# Patient Record
Sex: FEMALE | Race: WHITE | Employment: PART TIME | URBAN - METROPOLITAN AREA
[De-identification: names, ages, dates, MRNs, and addresses within clinical notes are randomized per-mention and may not be internally consistent; named-entity substitution may affect disease eponyms.]

---

## 2017-07-21 ENCOUNTER — OFFICE VISIT (OUTPATIENT)
Dept: INTERNAL MEDICINE | Facility: CLINIC | Age: 57
End: 2017-07-21
Payer: MEDICARE

## 2017-07-21 VITALS
TEMPERATURE: 97.8 F | BODY MASS INDEX: 26.68 KG/M2 | HEART RATE: 74 BPM | HEIGHT: 66 IN | SYSTOLIC BLOOD PRESSURE: 110 MMHG | RESPIRATION RATE: 16 BRPM | DIASTOLIC BLOOD PRESSURE: 70 MMHG | WEIGHT: 166 LBS | OXYGEN SATURATION: 98 %

## 2017-07-21 DIAGNOSIS — M48.07 FORAMINAL STENOSIS OF LUMBOSACRAL REGION: Primary | ICD-10-CM

## 2017-07-21 DIAGNOSIS — G89.29 CHRONIC LEFT-SIDED LOW BACK PAIN WITH LEFT-SIDED SCIATICA: ICD-10-CM

## 2017-07-21 DIAGNOSIS — R53.83 FATIGUE, UNSPECIFIED TYPE: ICD-10-CM

## 2017-07-21 DIAGNOSIS — K21.9 GASTROESOPHAGEAL REFLUX DISEASE WITHOUT ESOPHAGITIS: ICD-10-CM

## 2017-07-21 DIAGNOSIS — M54.42 CHRONIC LEFT-SIDED LOW BACK PAIN WITH LEFT-SIDED SCIATICA: ICD-10-CM

## 2017-07-21 DIAGNOSIS — E55.9 VITAMIN D DEFICIENCY: ICD-10-CM

## 2017-07-21 PROCEDURE — 99215 OFFICE O/P EST HI 40 MIN: CPT | Performed by: INTERNAL MEDICINE

## 2017-07-21 RX ORDER — CYANOCOBALAMIN (VITAMIN B-12) 2500 MCG
2500 TABLET, SUBLINGUAL SUBLINGUAL DAILY
Qty: 100 TABLET | Refills: 3 | Status: SHIPPED | OUTPATIENT
Start: 2017-07-21 | End: 2018-02-19

## 2017-07-21 RX ORDER — BACLOFEN 10 MG/1
TABLET ORAL
Qty: 60 TABLET | Refills: 1 | Status: SHIPPED | OUTPATIENT
Start: 2017-07-21 | End: 2018-02-19

## 2017-07-21 RX ORDER — ACETAMINOPHEN 500 MG
1000 TABLET ORAL EVERY 8 HOURS
Qty: 100 TABLET | Refills: 0 | Status: SHIPPED | OUTPATIENT
Start: 2017-07-21 | End: 2018-02-19

## 2017-07-21 RX ORDER — GABAPENTIN 300 MG/1
300 CAPSULE ORAL AT BEDTIME
Qty: 90 CAPSULE | Refills: 3 | Status: SHIPPED | OUTPATIENT
Start: 2017-07-21 | End: 2018-02-19

## 2017-07-21 NOTE — PROGRESS NOTES
SUBJECTIVE:                                                        OFFICE VISIT VISIT ACCOMPLISHED WITH THE HELP OF SARINA       Ar Elisabeth Patricia is a 57 year old female who presents to clinic today for the following health issues:RSULTS  RESULTS    Back Pain       Duration: YEARS        Specific cause: lifting    Description:   Location of pain: low back bilateral    Character: 6/10, moderate, radiates down into the left leg with numbness and tingling but no weakness noted.    History:   Pain interferes with job: YES  History of back problems: no prior back problems  Any previous MRI or X-rays: None  Sees a specialist for back pain:  [facility] md [place]  Therapies tried without relief: acetaminophen (Tylenol)    Alleviating factors:   Improved by: NOTHING      Precipitating factors:  Worsened by: Lifting and Bending    Functional and Psychosocial Screen (Sadia STarT Back):      Not performed today        Accompanying Signs & Symptoms:  Risk of Fracture:  None  Risk of Cauda Equina:  None  Risk of Infection:  None  Risk of Cancer:  None  Risk of Ankylosing Spondylitis:  Onset at age <35, male, AND morning back stiffness. no       She was seen approximately 7 seven months ago with back pain and was referred to orthopedic surgery and follow through but I do not see any records here in my system. I am unsure of what the plan was at the time.      Previoust labs are as noted and addressed in the plan section of this note. She reports that she has been taking her supplements as prescribed.as documented      Problem list and histories reviewed & adjusted, as indicated.  Additional history: as documented    Labs reviewed in EPIC    Reviewed and updated as needed this visit by clinical staff  Tobacco  Allergies  Meds  Med Hx  Surg Hx  Fam Hx  Soc Hx      Reviewed and updated as needed this visit by Provider         ROS:  14 point ROS negative except as above      OBJECTIVE:     /70  Pulse 74  Temp  "97.8  F (36.6  C) (Oral)  Resp 16  Ht 5' 6\" (1.676 m)  Wt 166 lb (75.3 kg)  LMP 07/28/2000  SpO2 98%  BMI 26.79 kg/m2  Body mass index is 26.79 kg/(m^2).  GENERAL: healthy, alert and no distress  NECK: no adenopathy, no asymmetry, masses, or scars and thyroid normal to palpation  RESP: lungs clear to auscultation - no rales, rhonchi or wheezes  CV: regular rate and rhythm, normal S1 S2, no S3 or S4, no murmur, click or rub, no peripheral edema and peripheral pulses strong  ABDOMEN: soft, nontender, no hepatosplenomegaly, no masses and bowel sounds normal  MS: tenderness to palpation lower lumbar spine and especially over L5-S1 paraspinal area  NEURO: Normal strength and tone, mentation intact and speech normal    Diagnostic Test Results:  Results for orders placed or performed during the hospital encounter of 12/09/16   DX Hip/Pelvis/Spine    Narrative    DX HIP/PELVIS/SPINE  12/9/2016 2:32 PM    HISTORY:  Asymptomatic menopausal state, Other specified disorders of  bone density and structure, unspecified site    FINDINGS: This DEXA scan was performed using a [employer] scanner.   DEXA results are reported according to T-score.  The T-score is the  standard deviation from the peak bone mass in a normal young adult  population.  In accordance with the ISCD (International Society of  Clinical Densitometry), the lower of the total proximal femur vs  femoral neck T-score is reported.  Osteopenia is defined as a T-score  of -1.0 to -2.5.  Osteoporosis is defined as a T-score of less than  -2.5.    T-SCORES:  Lumbar Spine L1-L4 T-score: -1.8    Left Hip (Neck) T-score: -0.6  Right Hip (Neck) T-score: -0.7    Hip lowest neck BMD: 0.945 gm/cm2.    FRAX 10-YEAR PROBABILITY OF FRACTURE*:  Major Osteoporotic: 3%  Hip: 0.1%    *All treatment decisions require clinical judgment and consideration  of individual patient factors which may not be captured in the FRAX  model and the risk of fracture may be over- or " under-estimated by  FRAX.      Impression    IMPRESSION: Lumbar spine osteopenia.    SARAH BAR MD       ASSESSMENT/PLAN:     DIAGNOSIS/PLAN:     ICD-10-CM    1. Fatigue, unspecified type R53.83 cyabnocobalamin (VITAMIN B-12) 2500 MCG sublingual tablet     Vitamin D Deficiency   2. Vitamin D deficiency E55.9 cholecalciferol 5000 UNITS CAPS     Calcium Carb-Cholecalciferol (CALCIUM 1000 + D) 1000-800 MG-UNIT TABS     Vitamin D Deficiency     Basic metabolic panel   3. Foraminal stenosis of lumbosacral region M99.83 ORTHOPEDICS ADULT REFERRAL     gabapentin (NEURONTIN) 300 MG capsule     CANCELED: ORTHOPEDICS ADULT REFERRAL   4. Chronic left-sided low back pain without sciatica M54.5 acetaminophen (TYLENOL) 500 MG tablet    G89.29 baclofen (LIORESAL) 10 MG tablet   5. Acute suppurative otitis media of left ear without spontaneous rupture of tympanic membrane, recurrence not specified H66.002    6. Gastroesophageal reflux disease without esophagitis K21.9 ranitidine (ZANTAC) 150 MG tablet       SIGNIFICANT ISSUES RE The primary encounter diagnosis was Fatigue, unspecified type. Diagnoses of Vitamin D deficiency, Foraminal stenosis of lumbosacral region, Chronic left-sided low back pain without sciatica, Acute suppurative otitis media of left ear without spontaneous rupture of tympanic membrane, recurrence not specified, and Gastroesophageal reflux disease without esophagitis were also pertinent to this visit. AS NOTED AND ADDRESSED ABOVE   MEDS AND AND LABS AS ORDERED TO ADDRESS PREVIOUS AND CURRENT ABNORMAL INDICES    SEE PATIENT INSTRUCTION SECTION FOR FOLLOW UP PLAN    Ardo IS TO CONTINUE OTHER TREATMENT REGIMEN/PLANS EXCEPT AS INDICATED    COMPLIANCE WITH MEDICATIONS DIET AND EXERCISE PLANS ENCOURAGED    DISCONTINUED MEDS:  Medications Discontinued During This Encounter   Medication Reason     cholecalciferol 5000 UNITS CAPS Reorder     Calcium Carb-Cholecalciferol (CALCIUM 1000 + D) 1000-800 MG-UNIT TABS  Reorder     cyabnocobalamin (VITAMIN B-12) 2500 MCG sublingual tablet Reorder     ibuprofen (ADVIL,MOTRIN) 600 MG tablet      naproxen (NAPROSYN) 500 MG tablet Reorder     baclofen (LIORESAL) 10 MG tablet Reorder     ranitidine (ZANTAC) 150 MG tablet Reorder       CURRENT MED LIST WITH CHANGES AS NOTED BELOW:  Current Outpatient Prescriptions   Medication Sig Dispense Refill     Acetaminophen (TYLENOL ARTHRITIS PAIN PO) Take 500 mg by mouth       cholecalciferol 5000 UNITS CAPS Take 1 capsule (5,000 Units) by mouth daily FOR VITAMIN D DEFICIENCY (LOW VITAMIN D) 100 capsule 3     Calcium Carb-Cholecalciferol (CALCIUM 1000 + D) 1000-800 MG-UNIT TABS Take 1 tablet by mouth daily TAKE WITH FOOD, FOR BONE HEALTH AND FOR VITAMIN D SUPPLEMENTATION 100 tablet PRN     cyabnocobalamin (VITAMIN B-12) 2500 MCG sublingual tablet Take 2,500 mcg by mouth daily INDICATION: FOR VITAMIN B12 SUPPLEMENTATION - TO IMPROVE MEMORY, BALANCE, SLEEP AND MOOD, DIRECTIONS: PLEASE PLACE UNDER THE TONGUE TO DISSOLVE 100 tablet 3     acetaminophen (TYLENOL) 500 MG tablet Take 2 tablets (1,000 mg) by mouth every 8 hours 100 tablet 0     baclofen (LIORESAL) 10 MG tablet take 1 tab in am, 1 tab in the afternoon and 2 tabs at bed time 60 tablet 1     gabapentin (NEURONTIN) 300 MG capsule Take 1 capsule (300 mg) by mouth At Bedtime 90 capsule 3     ranitidine (ZANTAC) 150 MG tablet Take 1 tablet (150 mg) by mouth 2 times daily 180 tablet 1         Patient Instructions       ** FOLLOW UP PLAN**:    PLEASE SCHEDULE LABS WITH OFFICE VISIT 4-5 MONTHS FROM TODAY TO FOLLOW UP ON  Vitamin Deficiencies  Fatigue, unspecified type  Chronic left-sided low back pain     BE SURE TO SCHEDULE YOUR LAB DRAW APPOINTMENT FOR AT LEAST 5 DAYS BEFORE YOUR NEXT VISIT      YOU HAVE BEEN REFERRED TO ORTHOPEDICS TO ADDRESS ISSUES RAISED TODAY REGARDING BACK PAIN   PLEASE  MAKE SURE TO SCHEDULE ORTHOPEDICS APPOINTMENT(S) BY TELEPHONE      YOU MAY CONTACT THE CLINIC IF ANY  QUESTIONS OR CONCERNS -[phone] OR VIA [employer]           Low-Cholesterol Diet  Your body needs cholesterol to build new cells and create certain hormones. There are 2 kinds of cholesterol in your blood:      HDL ( good ) cholesterol. This prevents fat deposits (plaque) from building up in your arteries. In this way it protects against heart disease and stroke.    LDL ( bad ) cholesterol. This stays in your body and sticks to artery walls. Over time it may block blood flow to the heart and brain. This can cause a heart attack or stroke.  The cholesterol in your blood comes from 2 sources: cholesterol in food that you eat and cholesterol that your liver makes. You should limit the amount of cholesterol in your diet. But the cholesterol that your body makes has the greatest disease risk. And your body makes more cholesterol when your diet is high in bad fats (saturated and trans fats). There are 2 kinds of fats you can eat:    Good fats, or unsaturated fats (mono-unsaturated and poly-unsaturated). They raise the level of good cholesterol and lower the level of bad cholesterol. Good fats are found in vegetable oils such as olive, sunflower, corn, and soybean oils, and in nuts and seeds.    Bad fats, or saturated fats (including foods high in cholesterol) and trans fats. These raise your risk of disease. They lower the good cholesterol and raise the level of bad cholesterol. Bad fats are found in animal products, including meat, whole-milk dairy products, and butter. Some plants are also high in bad fats (coconut and palm plants). Trans fats are found in hard (stick) margarines. They are also in many fast foods and commercially baked goods. Soft margarine sold in tubs has fewer trans fats and is safer to use.  High blood cholesterol is usually due to a diet high in saturated fat, along with not being physically active. In some cases, genetics plays a role in causing high cholesterol. The tips below will help you  create healthy eating habits that will help lower your blood cholesterol level.  Create a diet high in good fats, low in bad fats (and low in cholesterol)  The following steps will help you create a diet high in good fats and low in bad fats:    Talk with your doctor before starting a low cholesterol diet or weight loss program.    Learn to read nutrition labels and select appropriate portion sizes.    When cooking, use plant-based unsaturated vegetable oils (sunflower, corn, soybean, canola, peanut, and olive oils).    Avoid saturated fats found in animal products such as meat, dairy (whole-milk, cheese and ice cream), poultry skin, and egg yolks. Plants high in saturated oils include coconut oil, palm oil, and palm kernel oil.    If you eat meat, choose smaller portions and lean cuts, such as round, dez, sirloin, or loin. Eat more meatless meals.    Replace meat with fish at least 2 times a week. Fish is an important source of the unsaturated fat called omega-3 fatty acids. This fat has potential to lower the risk of heart disease.    Replace whole-milk dairy products with low-fat or nonfat products. Try soy products. Soy helps to reduce total cholesterol.    Supplement your diet with protective fibers. Eat nuts, seeds, and whole grains rather than white rice and bread. These foods lower both cholesterol and triglyceride levels. (Triglycerides are another fat found in the blood.) Walnuts are one of the best sources of omega-3 fatty acids.    Eat plenty of fresh fruits and vegetables daily.    Avoid fast foods and commercial baked goods. Assume they contain saturated fat unless labeled otherwise.  Date Last Reviewed: 8/1/2016 2000-2017 [employer]. [address] [place] [address], [place] [address]. All rights reserved. This information is not intended as a substitute for professional medical care. Always follow your healthcare professional's instructions.            Joel Santoro MD  [place]  [facility]

## 2017-07-21 NOTE — PATIENT INSTRUCTIONS
** FOLLOW UP PLAN**:    PLEASE SCHEDULE LABS WITH OFFICE VISIT 4-5 MONTHS FROM TODAY TO FOLLOW UP ON  Vitamin Deficiencies  Fatigue, unspecified type  Chronic left-sided low back pain     BE SURE TO SCHEDULE YOUR LAB DRAW APPOINTMENT FOR AT LEAST 5 DAYS BEFORE YOUR NEXT VISIT      YOU HAVE BEEN REFERRED TO ORTHOPEDICS TO ADDRESS ISSUES RAISED TODAY REGARDING BACK PAIN   PLEASE  MAKE SURE TO SCHEDULE ORTHOPEDICS APPOINTMENT(S) BY TELEPHONE      YOU MAY CONTACT THE CLINIC IF ANY QUESTIONS OR CONCERNS -[phone] OR VIA [employer]           Low-Cholesterol Diet  Your body needs cholesterol to build new cells and create certain hormones. There are 2 kinds of cholesterol in your blood:      HDL ( good ) cholesterol. This prevents fat deposits (plaque) from building up in your arteries. In this way it protects against heart disease and stroke.    LDL ( bad ) cholesterol. This stays in your body and sticks to artery walls. Over time it may block blood flow to the heart and brain. This can cause a heart attack or stroke.  The cholesterol in your blood comes from 2 sources: cholesterol in food that you eat and cholesterol that your liver makes. You should limit the amount of cholesterol in your diet. But the cholesterol that your body makes has the greatest disease risk. And your body makes more cholesterol when your diet is high in bad fats (saturated and trans fats). There are 2 kinds of fats you can eat:    Good fats, or unsaturated fats (mono-unsaturated and poly-unsaturated). They raise the level of good cholesterol and lower the level of bad cholesterol. Good fats are found in vegetable oils such as olive, sunflower, corn, and soybean oils, and in nuts and seeds.    Bad fats, or saturated fats (including foods high in cholesterol) and trans fats. These raise your risk of disease. They lower the good cholesterol and raise the level of bad cholesterol. Bad fats are found in animal products, including meat,  whole-milk dairy products, and butter. Some plants are also high in bad fats (coconut and palm plants). Trans fats are found in hard (stick) margarines. They are also in many fast foods and commercially baked goods. Soft margarine sold in tubs has fewer trans fats and is safer to use.  High blood cholesterol is usually due to a diet high in saturated fat, along with not being physically active. In some cases, genetics plays a role in causing high cholesterol. The tips below will help you create healthy eating habits that will help lower your blood cholesterol level.  Create a diet high in good fats, low in bad fats (and low in cholesterol)  The following steps will help you create a diet high in good fats and low in bad fats:    Talk with your doctor before starting a low cholesterol diet or weight loss program.    Learn to read nutrition labels and select appropriate portion sizes.    When cooking, use plant-based unsaturated vegetable oils (sunflower, corn, soybean, canola, peanut, and olive oils).    Avoid saturated fats found in animal products such as meat, dairy (whole-milk, cheese and ice cream), poultry skin, and egg yolks. Plants high in saturated oils include coconut oil, palm oil, and palm kernel oil.    If you eat meat, choose smaller portions and lean cuts, such as round, dez, sirloin, or loin. Eat more meatless meals.    Replace meat with fish at least 2 times a week. Fish is an important source of the unsaturated fat called omega-3 fatty acids. This fat has potential to lower the risk of heart disease.    Replace whole-milk dairy products with low-fat or nonfat products. Try soy products. Soy helps to reduce total cholesterol.    Supplement your diet with protective fibers. Eat nuts, seeds, and whole grains rather than white rice and bread. These foods lower both cholesterol and triglyceride levels. (Triglycerides are another fat found in the blood.) Walnuts are one of the best sources of omega-3  fatty acids.    Eat plenty of fresh fruits and vegetables daily.    Avoid fast foods and commercial baked goods. Assume they contain saturated fat unless labeled otherwise.  Date Last Reviewed: 8/1/2016 2000-2017 [employer]. [address] [place] [address], [place] [address]. All rights reserved. This information is not intended as a substitute for professional medical care. Always follow your healthcare professional's instructions.

## 2017-07-21 NOTE — MR AVS SNAPSHOT
After Visit Summary   7/21/2017    Colette Patricia    MRN: [id number]           Patient Information     Date Of Birth          1960        Visit Information        Provider Department      7/21/2017 10:15 AM Joel Santoro MD; [employer] [facility]        Today's Diagnoses     Foraminal stenosis of lumbosacral region    -  1    Vitamin D deficiency        Fatigue, unspecified type        Chronic left-sided low back pain with left-sided sciatica        Gastroesophageal reflux disease without esophagitis          Care Instructions        ** FOLLOW UP PLAN**:    PLEASE SCHEDULE LABS WITH OFFICE VISIT 4-5 MONTHS FROM TODAY TO FOLLOW UP ON  Vitamin Deficiencies  Fatigue, unspecified type  Chronic left-sided low back pain     BE SURE TO SCHEDULE YOUR LAB DRAW APPOINTMENT FOR AT LEAST 5 DAYS BEFORE YOUR NEXT VISIT      YOU HAVE BEEN REFERRED TO ORTHOPEDICS TO ADDRESS ISSUES RAISED TODAY REGARDING BACK PAIN   PLEASE  MAKE SURE TO SCHEDULE ORTHOPEDICS APPOINTMENT(S) BY TELEPHONE      YOU MAY CONTACT THE CLINIC IF ANY QUESTIONS OR CONCERNS -[phone] OR VIA [employer]           Low-Cholesterol Diet  Your body needs cholesterol to build new cells and create certain hormones. There are 2 kinds of cholesterol in your blood:      HDL ( good ) cholesterol. This prevents fat deposits (plaque) from building up in your arteries. In this way it protects against heart disease and stroke.    LDL ( bad ) cholesterol. This stays in your body and sticks to artery walls. Over time it may block blood flow to the heart and brain. This can cause a heart attack or stroke.  The cholesterol in your blood comes from 2 sources: cholesterol in food that you eat and cholesterol that your liver makes. You should limit the amount of cholesterol in your diet. But the cholesterol that your body makes has the greatest disease risk. And your body makes more cholesterol when your diet is  high in bad fats (saturated and trans fats). There are 2 kinds of fats you can eat:    Good fats, or unsaturated fats (mono-unsaturated and poly-unsaturated). They raise the level of good cholesterol and lower the level of bad cholesterol. Good fats are found in vegetable oils such as olive, sunflower, corn, and soybean oils, and in nuts and seeds.    Bad fats, or saturated fats (including foods high in cholesterol) and trans fats. These raise your risk of disease. They lower the good cholesterol and raise the level of bad cholesterol. Bad fats are found in animal products, including meat, whole-milk dairy products, and butter. Some plants are also high in bad fats (coconut and palm plants). Trans fats are found in hard (stick) margarines. They are also in many fast foods and commercially baked goods. Soft margarine sold in tubs has fewer trans fats and is safer to use.  High blood cholesterol is usually due to a diet high in saturated fat, along with not being physically active. In some cases, genetics plays a role in causing high cholesterol. The tips below will help you create healthy eating habits that will help lower your blood cholesterol level.  Create a diet high in good fats, low in bad fats (and low in cholesterol)  The following steps will help you create a diet high in good fats and low in bad fats:    Talk with your doctor before starting a low cholesterol diet or weight loss program.    Learn to read nutrition labels and select appropriate portion sizes.    When cooking, use plant-based unsaturated vegetable oils (sunflower, corn, soybean, canola, peanut, and olive oils).    Avoid saturated fats found in animal products such as meat, dairy (whole-milk, cheese and ice cream), poultry skin, and egg yolks. Plants high in saturated oils include coconut oil, palm oil, and palm kernel oil.    If you eat meat, choose smaller portions and lean cuts, such as round, dez, sirloin, or loin. Eat more meatless  meals.    Replace meat with fish at least 2 times a week. Fish is an important source of the unsaturated fat called omega-3 fatty acids. This fat has potential to lower the risk of heart disease.    Replace whole-milk dairy products with low-fat or nonfat products. Try soy products. Soy helps to reduce total cholesterol.    Supplement your diet with protective fibers. Eat nuts, seeds, and whole grains rather than white rice and bread. These foods lower both cholesterol and triglyceride levels. (Triglycerides are another fat found in the blood.) Walnuts are one of the best sources of omega-3 fatty acids.    Eat plenty of fresh fruits and vegetables daily.    Avoid fast foods and commercial baked goods. Assume they contain saturated fat unless labeled otherwise.  Date Last Reviewed: 8/1/2016 2000-2017 [employer]. [address] [place] [address]. All rights reserved. This information is not intended as a substitute for professional medical care. Always follow your healthcare professional's instructions.                Follow-ups after your visit        Additional Services     ORTHOPEDICS ADULT REFERRAL       Your provider has referred you to: [facility]: [facility]  [phone]   [email or web address]    Please be aware that coverage of these services is subject to the terms and limitations of your health insurance plan.  Call member services at your health plan with any benefit or coverage questions.      Please bring the following to your appointment:    >>   Any x-rays, CTs or MRIs which have been performed.  Contact the facility where they were done to arrange for  prior to your scheduled appointment.    >>   List of current medications   >>   This referral request   >>   Any documents/labs given to you for this referral                  Future tests that  "were ordered for you today     Open Future Orders        Priority Expected Expires Ordered    Vitamin D Deficiency Routine 2017    Basic metabolic panel Routine 2017            Who to contact     If you have questions or need follow up information about today's clinic visit or your schedule please contact [facility] directly at [phone].  Normal or non-critical lab and imaging results will be communicated to you by [employer]art, letter or phone within 4 business days after the clinic has received the results. If you do not hear from us within 7 days, please contact the clinic through youwhot or phone. If you have a critical or abnormal lab result, we will notify you by phone as soon as possible.  Submit refill requests through [email or web address] or call your pharmacy and they will forward the refill request to us. Please allow 3 business days for your refill to be completed.          Additional Information About Your Visit        [employer] Information     [email or web address] lets you send messages to your doctor, view your test results, renew your prescriptions, schedule appointments and more. To sign up, go to [email or web address] . Click on \"Log in\" on the left side of the screen, which will take you to the Welcome page. Then click on \"Sign up Now\" on the right side of the page.     You will be asked to enter the access code listed below, as well as some personal information. Please follow the directions to create your username and password.     Your access code is: [email or web address]  Expires: 10/19/2017 12:19 PM     Your access code will  in 90 days. If you need help or a new code, please call your [facility] clinic or [phone].        Care EveryWhere ID     This is your Care EveryWhere ID. This could be used by other organizations to access your [facility] medical records  [id number]        Your Vitals Were     Pulse Temperature Respirations Height Last " "Period Pulse Oximetry    74 97.8  F (36.6  C) (Oral) 16 5' 6\" (1.676 m) 07/28/2000 98%    BMI (Body Mass Index)                   26.79 kg/m2            Blood Pressure from Last 3 Encounters:   07/21/17 110/70   12/02/16 130/80   09/09/16 110/70    Weight from Last 3 Encounters:   07/21/17 166 lb (75.3 kg)   12/02/16 163 lb 11.2 oz (74.3 kg)   09/09/16 160 lb 14.4 oz (73 kg)              We Performed the Following     ORTHOPEDICS ADULT REFERRAL          Today's Medication Changes          These changes are accurate as of: 7/21/17 12:19 PM.  If you have any questions, ask your nurse or doctor.               Start taking these medicines.        Dose/Directions    Calcium Carb-Cholecalciferol 1000-800 MG-UNIT Tabs   Commonly known as:  CALCIUM 1000 + D   Used for:  Vitamin D deficiency   Started by:  Joel Santoro MD        Dose:  1 tablet   Take 1 tablet by mouth daily TAKE WITH FOOD, FOR BONE HEALTH AND FOR VITAMIN D SUPPLEMENTATION   Quantity:  100 tablet   Refills:  PRN       cholecalciferol 5000 UNITS Caps   Used for:  Vitamin D deficiency   Started by:  Joel Santoro MD        Dose:  1 capsule   Take 1 capsule (5,000 Units) by mouth daily FOR VITAMIN D DEFICIENCY (LOW VITAMIN D)   Quantity:  100 capsule   Refills:  3       cyabnocobalamin 2500 MCG sublingual tablet   Commonly known as:  VITAMIN B-12   Used for:  Fatigue, unspecified type   Started by:  Joel Santoro MD        Dose:  2500 mcg   Take 2,500 mcg by mouth daily INDICATION: FOR VITAMIN B12 SUPPLEMENTATION - TO IMPROVE MEMORY, BALANCE, SLEEP AND MOOD, DIRECTIONS: PLEASE PLACE UNDER THE TONGUE TO DISSOLVE   Quantity:  100 tablet   Refills:  3       gabapentin 300 MG capsule   Commonly known as:  NEURONTIN   Used for:  Foraminal stenosis of lumbosacral region   Started by:  Joel Santoro MD        Dose:  300 mg   Take 1 capsule (300 mg) by mouth At Bedtime   Quantity:  90 capsule   Refills:  3         These medicines have changed " or have updated prescriptions.        Dose/Directions    * TYLENOL ARTHRITIS PAIN PO   This may have changed:  Another medication with the same name was added. Make sure you understand how and when to take each.   Changed by:  Joel Santoro MD        Dose:  500 mg   Take 500 mg by mouth   Refills:  0       * acetaminophen 500 MG tablet   Commonly known as:  TYLENOL   This may have changed:  You were already taking a medication with the same name, and this prescription was added. Make sure you understand how and when to take each.   Used for:  Chronic left-sided low back pain with left-sided sciatica   Replaces:  naproxen 500 MG tablet   Changed by:  Joel Santoro MD        Dose:  1000 mg   Take 2 tablets (1,000 mg) by mouth every 8 hours   Quantity:  100 tablet   Refills:  0       * Notice:  This list has 2 medication(s) that are the same as other medications prescribed for you. Read the directions carefully, and ask your doctor or other care provider to review them with you.      Stop taking these medicines if you haven't already. Please contact your care team if you have questions.     ibuprofen 600 MG tablet   Commonly known as:  ADVIL/MOTRIN   Stopped by:  Joel Santoro MD           naproxen 500 MG tablet   Commonly known as:  NAPROSYN   Replaced by:  acetaminophen 500 MG tablet   Stopped by:  Joel Santoro MD                Where to get your medicines      These medications were sent to [address] [address]     Phone:  [phone]     acetaminophen 500 MG tablet    baclofen 10 MG tablet    Calcium Carb-Cholecalciferol 1000-800 MG-UNIT Tabs    cholecalciferol 5000 UNITS Caps    gabapentin 300 MG capsule    ranitidine 150 MG tablet         Some of these will need a paper prescription and others can be bought over the counter.  Ask your nurse if you have questions.     Bring a paper prescription for each of  these medications     cyabnocobalamin 2500 MCG sublingual tablet                Primary Care Provider Office Phone # Fax #    Alexis Morris [phone]       XXX RESIGNED  [address] [facility] [address]  [facility] [address]        Equal Access to Services     ELYSSA SELLERS : Hadii aad ku hadasho Soomaali, waaxda luqadaha, qaybta kaalmada adeegyada, waxay vikin hayirenen adeeg keshia laMitrairenerasheed . So [facility] [phone].    ATENCIÓN: Si habla español, tiene a arroyo disposición servicios gratuitos de asistencia lingüística. Llame al [phone].    We comply with applicable federal civil rights laws and [place] laws. We do not discriminate on the basis of race, color, national origin, age, disability sex, sexual orientation or gender identity.            Thank you!     Thank you for choosing [facility]  for your care. Our goal is always to provide you with excellent care. Hearing back from our patients is one way we can continue to improve our services. Please take a few minutes to complete the written survey that you may receive in the mail after your visit with us. Thank you!             Your Updated Medication List - Protect others around you: Learn how to safely use, store and throw away your medicines at www.disposemymeds.org.          This list is accurate as of: 7/21/17 12:19 PM.  Always use your most recent med list.                   Brand Name Dispense Instructions for use Diagnosis    baclofen 10 MG tablet    LIORESAL    60 tablet    take 1 tab in am, 1 tab in the afternoon and 2 tabs at bed time    Chronic left-sided low back pain with left-sided sciatica       Calcium Carb-Cholecalciferol 1000-800 MG-UNIT Tabs    CALCIUM 1000 + D    100 tablet    Take 1 tablet by mouth daily TAKE WITH FOOD, FOR BONE HEALTH AND FOR VITAMIN D SUPPLEMENTATION    Vitamin D deficiency       cholecalciferol 5000 UNITS Caps     100 capsule    Take 1 capsule (5,000 Units) by mouth daily FOR  VITAMIN D DEFICIENCY (LOW VITAMIN D)    Vitamin D deficiency       cyabnocobalamin 2500 MCG sublingual tablet    VITAMIN B-12    100 tablet    Take 2,500 mcg by mouth daily INDICATION: FOR VITAMIN B12 SUPPLEMENTATION - TO IMPROVE MEMORY, BALANCE, SLEEP AND MOOD, DIRECTIONS: PLEASE PLACE UNDER THE TONGUE TO DISSOLVE    Fatigue, unspecified type       gabapentin 300 MG capsule    NEURONTIN    90 capsule    Take 1 capsule (300 mg) by mouth At Bedtime    Foraminal stenosis of lumbosacral region       ranitidine 150 MG tablet    ZANTAC    180 tablet    Take 1 tablet (150 mg) by mouth 2 times daily    Gastroesophageal reflux disease without esophagitis       * TYLENOL ARTHRITIS PAIN PO      Take 500 mg by mouth        * acetaminophen 500 MG tablet    TYLENOL    100 tablet    Take 2 tablets (1,000 mg) by mouth every 8 hours    Chronic left-sided low back pain with left-sided sciatica       * Notice:  This list has 2 medication(s) that are the same as other medications prescribed for you. Read the directions carefully, and ask your doctor or other care provider to review them with you.

## 2017-07-21 NOTE — NURSING NOTE
"Chief Complaint   Patient presents with     Results     VITAMIN DEFICIENCIES     Back Pain     FOLLOW UP       Initial /70  Pulse 74  Temp 97.8  F (36.6  C) (Oral)  Resp 16  Ht 5' 6\" (1.676 m)  Wt 166 lb (75.3 kg)  LMP 07/28/2000  SpO2 98%  BMI 26.79 kg/m2 Estimated body mass index is 26.79 kg/(m^2) as calculated from the following:    Height as of this encounter: 5' 6\" (1.676 m).    Weight as of this encounter: 166 lb (75.3 kg).  Blood pressure completed using cuff size: regular    "

## 2017-07-26 ENCOUNTER — RADIANT APPOINTMENT (OUTPATIENT)
Dept: GENERAL RADIOLOGY | Facility: CLINIC | Age: 57
End: 2017-07-26
Attending: PHYSICAL MEDICINE & REHABILITATION
Payer: MEDICARE

## 2017-07-26 ENCOUNTER — OFFICE VISIT (OUTPATIENT)
Dept: ORTHOPEDICS | Facility: CLINIC | Age: 57
End: 2017-07-26
Payer: MEDICARE

## 2017-07-26 VITALS
WEIGHT: 166 LBS | BODY MASS INDEX: 26.68 KG/M2 | DIASTOLIC BLOOD PRESSURE: 70 MMHG | HEIGHT: 66 IN | SYSTOLIC BLOOD PRESSURE: 112 MMHG

## 2017-07-26 DIAGNOSIS — M48.061 LUMBAR SPINAL STENOSIS: ICD-10-CM

## 2017-07-26 DIAGNOSIS — M25.552 LEFT HIP PAIN: ICD-10-CM

## 2017-07-26 DIAGNOSIS — M51.369 LUMBAR DEGENERATIVE DISC DISEASE: ICD-10-CM

## 2017-07-26 DIAGNOSIS — M43.17 SPONDYLOLISTHESIS OF LUMBOSACRAL REGION: ICD-10-CM

## 2017-07-26 DIAGNOSIS — M43.17 SPONDYLOLISTHESIS OF LUMBOSACRAL REGION: Primary | ICD-10-CM

## 2017-07-26 PROCEDURE — 73502 X-RAY EXAM HIP UNI 2-3 VIEWS: CPT

## 2017-07-26 PROCEDURE — 99205 OFFICE O/P NEW HI 60 MIN: CPT | Performed by: PHYSICAL MEDICINE & REHABILITATION

## 2017-07-26 PROCEDURE — 72100 X-RAY EXAM L-S SPINE 2/3 VWS: CPT

## 2017-07-26 NOTE — MR AVS SNAPSHOT
After Visit Summary   7/26/2017    Colette Patricia    MRN: [id number]           Patient Information     Date Of Birth          1960        Visit Information        Provider Department      7/26/2017 11:15 AM Dejuan Garcia DO; [employer] [facility]        Today's Diagnoses     Spondylolisthesis of lumbosacral region    -  1    Lumbar degenerative disc disease        Lumbar spinal stenosis        Left hip pain          Care Instructions    We addressed the following today:    1. Lumbar spondylolisthesis/arthritis/spinal stenosis    Activity modification as discussed  Home exercise program as instructed  Physical therapy: [facility] - [phone]  Topical Treatments: Ice or heat  Over the counter medication: Acetaminophen (Tylenol) 1000 mg every 6 hours with food (Maximum of 3000 mg/day)  Ibuprofen (Advil) maximum of 800 mg four times a day with food  Other specific instructions: Referral to spine surgery for consideration of surgical intervention for further treatment purposes  Follow up as needed for further evaluation/medical care (sooner if needed; call direct clinic number [[phone]] at any time with questions or concerns)              Follow-ups after your visit        Additional Services     [facility] PT, HAND, AND CHIROPRACTIC REFERRAL       **This order will print in the [facility] Scheduling Office**    Physical Therapy, Hand Therapy and Chiropractic Care are available through:    *[facility]  *[facility]  *[facility]    Call one number to schedule at any of the above locations: [phone].    Your provider has referred you to: Physical Therapy at [facility] or [facility]    Indication/Reason for Referral: Low Back Pain  Onset of Illness: Years  Therapy Orders: Evaluate and Treat  Special Programs: None  Special Request: None    Sadia Mccullough      Additional Comments for the Therapist or  Chiropractor: Formal physical therapy - exercises to include abdominal strengthening, lumbar spine strengthening/stabilization/endurance/motor control, and muscle-length balancing with avoidance of extension activities with use of modalities as needed with home exercise prescription.    Please be aware that coverage of these services is subject to the terms and limitations of your health insurance plan.  Call member services at your health plan with any benefit or coverage questions.      Please bring the following to your appointment:    *Your personal calendar for scheduling future appointments  *Comfortable clothing            ORTHO  REFERRAL       Coverage of these services is subject to the terms and limitations of your health insurance plan. Please call member services at your health plan with any benefit or coverage questions.       [facility] is referring you to the Orthopedic  Services at [facility].       The  representative will assist you in the coordination of your orthopedic and musculoskeletal care as prescribed by your physician.    The  representative will call you within 24 hours or   You may contact the  representative at:   [phone] for [facility]  [phone] for [facility], and [facility]  [phone] for [facility]    Type of Referral : Spine: Lumbar  **Choose Medical Spine Specialist (unless patient was seen by a Medical Spine Specialist within the past 6 months).**  Surgical Evaluation is advised if the patient presents with one or more of the following red flags: Evidence of Spinal Tumor, Infection or Fracture, Cauda Equina Syndrome, Sudden or Progressive Weakness, Loss of Bowel or Bladder Control, or any other documented emergent neurological condition resulting from a Lumbar Spinal Condition. Spine Surgeon - Dr. Jaya Christianson ONLY - no mid level providers        Timeframe  requested: Routine       If X-rays, CT or MRI's   have been performed, please contact the facility where they were done, to arrange for  prior to your scheduled appointment. Please bring this referral request to your appointment and present it to your specialist.                  Your next 10 appointments already scheduled     Oct 16, 2017  9:00 AM CDT   LAB with [facility]   [address] [phone] [phone]           Patient must bring picture ID.  Patient should be prepared to give a urine specimen  Please do not eat 10-12 hours before your appointment if you are coming in fasting for labs on lipids, cholesterol, or glucose (sugar).  Pregnant women should follow their Care Team instructions. Water with medications is okay. Do not drink coffee or other fluids.   If you have concerns about taking  your medications, please ask at office or if scheduling via [employer], send a message by clicking on Secure Messaging, Message Your Care Team.            Oct 23, 2017 10:00 AM CDT   [facility]   [facility]    [address] [place] [phone]           Bring a current list of meds and any records pertaining to this visit.  For Physicals, please bring immunization records and any forms needing to be filled out.  Please arrive 10 minutes early to complete paperwork.              Who to contact     If you have questions or need follow up information about today's clinic visit or your schedule please contact [facility]IC[facility] directly at [phone].  Normal or non-critical lab and imaging results will be communicated to you by MyChart, letter or phone within 4 business days after the clinic has received the results. If you do not hear from us within 7 days, please contact the clinic  "through [employer] or phone. If you have a critical or abnormal lab result, we will notify you by phone as soon as possible.  Submit refill requests through [employer] or call your pharmacy and they will forward the refill request to us. Please allow 3 business days for your refill to be completed.          Additional Information About Your Visit        [employer] Information     [employer] lets you send messages to your doctor, view your test results, renew your prescriptions, schedule appointments and more. To sign up, go to [email or web address] . Click on \"Log in\" on the left side of the screen, which will take you to the Welcome page. Then click on \"Sign up Now\" on the right side of the page.     You will be asked to enter the access code listed below, as well as some personal information. Please follow the directions to create your username and password.     Your access code is: [email or web address]  Expires: 10/19/2017 12:19 PM     Your access code will  in 90 days. If you need help or a new code, please call your [facility] clinic or [phone].        Care EveryWhere ID     This is your Care EveryWhere ID. This could be used by other organizations to access your [facility] medical records  [id number]        Your Vitals Were     Height Last Period BMI (Body Mass Index)             5' 6\" (1.676 m) 2000 26.79 kg/m2          Blood Pressure from Last 3 Encounters:   17 112/70   17 110/70   16 130/80    Weight from Last 3 Encounters:   17 166 lb (75.3 kg)   17 166 lb (75.3 kg)   16 163 lb 11.2 oz (74.3 kg)              We Performed the Following     [facility] PT, HAND, AND CHIROPRACTIC REFERRAL     ORTHO [facility] REFERRAL        Primary Care Provider Office Phone # Fax #    Alexis Morris [phone]       XXX RESIGNED  E NICOLLET [address] [id number]        Equal Access to Services     ELYSSA SELLERS AH: norma Norris, " amber hodge ah. So [facility] [phone].    ATENCIÓN: Si john grewal, tiene a arroyo disposición servicios gratuitos de asistencia lingüística. Nino al [phone].    We comply with applicable federal civil rights laws and [place] laws. We do not discriminate on the basis of race, color, national origin, age, disability sex, sexual orientation or gender identity.            Thank you!     Thank you for choosing [facility]  for your care. Our goal is always to provide you with excellent care. Hearing back from our patients is one way we can continue to improve our services. Please take a few minutes to complete the written survey that you may receive in the mail after your visit with us. Thank you!             Your Updated Medication List - Protect others around you: Learn how to safely use, store and throw away your medicines at www.disposemymeds.org.          This list is accurate as of: 7/26/17 12:35 PM.  Always use your most recent med list.                   Brand Name Dispense Instructions for use Diagnosis    baclofen 10 MG tablet    LIORESAL    60 tablet    take 1 tab in am, 1 tab in the afternoon and 2 tabs at bed time    Chronic left-sided low back pain with left-sided sciatica       Calcium Carb-Cholecalciferol 1000-800 MG-UNIT Tabs    CALCIUM 1000 + D    100 tablet    Take 1 tablet by mouth daily TAKE WITH FOOD, FOR BONE HEALTH AND FOR VITAMIN D SUPPLEMENTATION    Vitamin D deficiency       cholecalciferol 5000 UNITS Caps     100 capsule    Take 1 capsule (5,000 Units) by mouth daily FOR VITAMIN D DEFICIENCY (LOW VITAMIN D)    Vitamin D deficiency       cyabnocobalamin 2500 MCG sublingual tablet    VITAMIN B-12    100 tablet    Take 2,500 mcg by mouth daily INDICATION: FOR VITAMIN B12 SUPPLEMENTATION - TO IMPROVE MEMORY, BALANCE, SLEEP AND MOOD, DIRECTIONS: PLEASE PLACE UNDER THE TONGUE TO DISSOLVE    Fatigue, unspecified type        gabapentin 300 MG capsule    NEURONTIN    90 capsule    Take 1 capsule (300 mg) by mouth At Bedtime    Foraminal stenosis of lumbosacral region       ranitidine 150 MG tablet    ZANTAC    180 tablet    Take 1 tablet (150 mg) by mouth 2 times daily    Gastroesophageal reflux disease without esophagitis       * TYLENOL ARTHRITIS PAIN PO      Take 500 mg by mouth        * acetaminophen 500 MG tablet    TYLENOL    100 tablet    Take 2 tablets (1,000 mg) by mouth every 8 hours    Chronic left-sided low back pain with left-sided sciatica       * Notice:  This list has 2 medication(s) that are the same as other medications prescribed for you. Read the directions carefully, and ask your doctor or other care provider to review them with you.

## 2017-07-26 NOTE — PATIENT INSTRUCTIONS
We addressed the following today:    1. Lumbar spondylolisthesis/arthritis/spinal stenosis    Activity modification as discussed  Home exercise program as instructed  Physical therapy: [facility] - [phone]  Topical Treatments: Ice or heat  Over the counter medication: Acetaminophen (Tylenol) 1000 mg every 6 hours with food (Maximum of 3000 mg/day)  Ibuprofen (Advil) maximum of 800 mg four times a day with food  Other specific instructions: Referral to spine surgery for consideration of surgical intervention for further treatment purposes  Follow up as needed for further evaluation/medical care (sooner if needed; call direct clinic number [[phone]] at any time with questions or concerns)

## 2017-07-26 NOTE — NURSING NOTE
"Chief Complaint   Patient presents with     Musculoskeletal Problem     chronic low back pain       Initial /70  Ht 5' 6\" (1.676 m)  Wt 166 lb (75.3 kg)  LMP 07/28/2000  BMI 26.79 kg/m2 Estimated body mass index is 26.79 kg/(m^2) as calculated from the following:    Height as of this encounter: 5' 6\" (1.676 m).    Weight as of this encounter: 166 lb (75.3 kg).  Medication Reconciliation: complete     Kobe Jordan, ATC      "

## 2017-07-26 NOTE — PROGRESS NOTES
" [facility]   Clinic Visit s Jul 26, 2017    Subjective:  Colette Patricia is a 57 year old female who is seen in consultation at the request of Dr. Santoro for evaluation of chronic low back pain with left-sided radiation. Colette Patricia is accompanied today by an .    Symptoms began 14 years ago.  Reports insidious onset without acute precipitating event.  Reports sharp, dull, and pressure left low back pain with left-sided radiation.  Pain is 8/10 in maximal severity and 7/10 currently.  Symptoms are generally worse with sitting, standing, bending forward, and with rolling over in bed and better with light walking activities.  Other treatment has consisted of Gabapentin, Acetaminophen, steroid injection (2009 at unknown location) and physical therapy (last year) with minimal relief.  Associated symptoms include denies any bowel/bladder dysfunction or saddle anesthesia.  Reports numbness/tingling of the left leg.  Denies any weakness of the lower extremities. Denies any previous low back surgeries.    Patient's past medical, surgical, social, and family histories are reviewed today.  No pertinent significant past medical history  Past Medical History:   Diagnosis Date     Other unspecified back disorder        Review of Systems:  Constitutional: NEGATIVE for fever, chills, or change in weight  Skin: NEGATIVE for worrisome rashes, moles, or lesions  Neuro: NEGATIVE for weakness of the lower extremities  MSK: see HPI  Additional 10 point ROS is negative other than symptoms noted above and in HPI    Objective:  /70  Ht 5' 6\" (1.676 m)  Wt 166 lb (75.3 kg)  LMP 07/28/2000  BMI 26.79 kg/m2  General: healthy, alert, and in no distress  Skin: no suspicious lesions or rashes  Psych: mentation appears normal and affect normal/bright  HEENT: no scleral icterus  CV: no pedal edema  Resp: normal respiratory effort without conversational dyspnea   Neuro: sensory exam is within normal " limits.  Motor strength as noted below  Lymph: no palpable lymphadenopathy    MSK:    THORACIC/LUMBAR SPINE  Inspection:    No redness, swelling, overlying skin change, or gross deformity/asymmetry  Palpation:    Tender about the lumbar spinous processes and left para lumbar muscles    No tenderness over the right para lumbar muscles, left SI joint, or left sciatic notch  Range of Motion:     Lumbar flexion within normal limits    Lumbar extension limited by pain  Strength:    5/5 - quadriceps, hamstrings, tibialis anterior, gastrocsoleus, and extensor hallicus longus (4+/5 right extensor hallicus longus)  Special Tests:    Positive: FATIMAH (left)    Negative: Straight leg raise (left), SI joint compression (bilateral), and Gaenslen's test (left)    LEFT HIP  Passive Range of Motion:    IR limited by pain / ER limited by pain  Special Tests:    Positive: Logroll    Negative: Anterior impingement (FADIR)    Imaging:  Outside records from [facility] were reviewed today and results were discussed with the patient   Previous films were reviewed today, independent visualization of images was performed, and results were discussed with the patient  Left hip x-rays were ordered, independent visualization of images was performed, and results were discussed with the patient  Preliminary Impression:  1. Negative for fracture, subluxation, joint space narrowing, or other acute osseous abnormalities.    Lumbar spine x-rays were ordered, independent visualization of images was performed, and results were discussed the patient  Preliminary Impression:  1. Increased anterior subluxation of L5 on S1 by 2.5 mm with lumbar flexion compared to lumbar extension.  2. Degenerative disc disease noted at L5-S1.  3. Negative for fracture or other acute osseous abnormalities.    MR LUMBAR SPINE WITHOUT CONTRAST - 12/9/2016   Impression:  1. Grade 1-2 spondylolytic spondylolisthesis at L5-S1 combines with advanced degenerative disc  disease to cause severe bilateral foraminal stenosis.  2. Very early degenerative disc disease at L1-L2 and L3-L4 without direct impingement on neural structures.    ASSESSMENT:  1. Lumbar spondylolisthesis  2. Lumbar degenerative disc disease  3. Lumbar spinal stenosis  4. Left hip pain    PLAN:  1. Referral to spine surgery for consideration of surgical intervention for further treatment purposes.  2. Return to formal physical therapy - exercises to include abdominal strengthening, lumbar spine strengthening/stabilization/endurance/motor control, and muscle-length balancing with avoidance of extension activities with use of modalities as needed with home exercise prescription..  3. Activity modification as discussed, including limitation of activities that cause pain/discomfort.  4. Acetaminophen/heat/ice/Ibuprofen as needed for improved pain control.  5. Follow-up as needed for further evaluation/medical care.  Consider pars interarticularis defect(s) corticosteroid injection(s), EMG/NCV of the left lower extremity, L5-S1 interlaminar epidural corticosteroid injection, etc. as deemed appropriate after spine surgery consultation. Instructed to contact our office should the condition evolve or worsen, or should any new/progressive neurologic symptoms develop.    Patient's conditions were thoroughly discussed during today's visit with greater than 50% of the visit spent counseling the patient with total time spent face-to-face with the patient being 20 minutes.    Dejuan Garcia DO, [facility]  [facility]    Disclaimer: This note consists of symbols derived from keyboarding, dictation and/or voice recognition software. As a result, there may be errors in the script that have gone undetected. Please consider this when interpreting information found in this chart.

## 2017-07-26 NOTE — Clinical Note
Dear Colette Armstrong saw me at [facility] on Jul 26, 2017.  Please refer to the visit note at your convenience and feel free to contact me should you have any questions.  Sincerely,  Dejuan Garcia DO, [facility]

## 2017-07-28 DIAGNOSIS — M43.17 SPONDYLOLISTHESIS OF LUMBOSACRAL REGION: Primary | ICD-10-CM

## 2017-08-01 DIAGNOSIS — M43.17 SPONDYLOLISTHESIS OF LUMBOSACRAL REGION: Primary | ICD-10-CM

## 2017-08-09 ENCOUNTER — OFFICE VISIT (OUTPATIENT)
Dept: NEUROSURGERY | Facility: CLINIC | Age: 57
End: 2017-08-09
Attending: INTERNAL MEDICINE
Payer: MEDICARE

## 2017-08-09 ENCOUNTER — HOSPITAL ENCOUNTER (OUTPATIENT)
Dept: MRI IMAGING | Facility: CLINIC | Age: 57
Discharge: HOME | End: 2017-08-09
Attending: NEUROLOGICAL SURGERY | Admitting: NEUROLOGICAL SURGERY
Payer: MEDICARE

## 2017-08-09 VITALS
SYSTOLIC BLOOD PRESSURE: 117 MMHG | WEIGHT: 166 LBS | HEART RATE: 82 BPM | BODY MASS INDEX: 26.79 KG/M2 | TEMPERATURE: 98.4 F | OXYGEN SATURATION: 97 % | DIASTOLIC BLOOD PRESSURE: 78 MMHG

## 2017-08-09 DIAGNOSIS — M54.41 CHRONIC BILATERAL LOW BACK PAIN WITH BILATERAL SCIATICA: ICD-10-CM

## 2017-08-09 DIAGNOSIS — M43.17 SPONDYLOLISTHESIS OF LUMBOSACRAL REGION: ICD-10-CM

## 2017-08-09 DIAGNOSIS — M54.42 CHRONIC BILATERAL LOW BACK PAIN WITH BILATERAL SCIATICA: ICD-10-CM

## 2017-08-09 DIAGNOSIS — M43.10 PARS DEFECT WITH SPONDYLOLISTHESIS: ICD-10-CM

## 2017-08-09 DIAGNOSIS — G89.29 CHRONIC BILATERAL LOW BACK PAIN WITH BILATERAL SCIATICA: ICD-10-CM

## 2017-08-09 DIAGNOSIS — M43.17 SPONDYLOLISTHESIS OF LUMBOSACRAL REGION: Primary | ICD-10-CM

## 2017-08-09 PROCEDURE — 99211 OFF/OP EST MAY X REQ PHY/QHP: CPT | Performed by: NEUROLOGICAL SURGERY

## 2017-08-09 PROCEDURE — 99203 OFFICE O/P NEW LOW 30 MIN: CPT | Performed by: NEUROLOGICAL SURGERY

## 2017-08-09 NOTE — NURSING NOTE
"Colette Patricia is a 57 year old female who presents for:  Chief Complaint   Patient presents with     Neurologic Problem     Spondylolisthesis of lumbosacral region        Initial Vitals:  /78 (BP Location: Right arm, Patient Position: Chair, Cuff Size: Adult Large)  Pulse 82  Temp 98.4  F (36.9  C) (Oral)  Wt 166 lb (75.3 kg)  LMP 07/28/2000  SpO2 97%  BMI 26.79 kg/m2 Estimated body mass index is 26.79 kg/(m^2) as calculated from the following:    Height as of 7/26/17: 5' 6\" (1.676 m).    Weight as of this encounter: 166 lb (75.3 kg).. Body surface area is 1.87 meters squared. BP completed using cuff size: large  Data Unavailable    Do you feel safe in your environment?  Yes  Do you need any refills today? NO    Nursing Comments:Spondylolisthesis of lumbosacral region .  Patient rates 8 pain today as 8/9/17      5 min. nursing intake time  Christine Goldman CMA      Discharge plan: See MD dictation  2 min. nursing discharge time  Christine Goldman CMA         "

## 2017-08-10 ENCOUNTER — THERAPY VISIT (OUTPATIENT)
Dept: PHYSICAL THERAPY | Facility: CLINIC | Age: 57
End: 2017-08-10
Payer: MEDICARE

## 2017-08-10 DIAGNOSIS — G89.29 CHRONIC LEFT-SIDED LOW BACK PAIN WITH LEFT-SIDED SCIATICA: Primary | ICD-10-CM

## 2017-08-10 DIAGNOSIS — M54.42 CHRONIC LEFT-SIDED LOW BACK PAIN WITH LEFT-SIDED SCIATICA: Primary | ICD-10-CM

## 2017-08-10 PROCEDURE — G8979 MOBILITY GOAL STATUS: HCPCS | Mod: GP | Performed by: PHYSICAL THERAPIST

## 2017-08-10 PROCEDURE — 97161 PT EVAL LOW COMPLEX 20 MIN: CPT | Mod: GP | Performed by: PHYSICAL THERAPIST

## 2017-08-10 PROCEDURE — 97112 NEUROMUSCULAR REEDUCATION: CPT | Mod: GP | Performed by: PHYSICAL THERAPIST

## 2017-08-10 PROCEDURE — G8978 MOBILITY CURRENT STATUS: HCPCS | Mod: GP | Performed by: PHYSICAL THERAPIST

## 2017-08-10 PROCEDURE — 97110 THERAPEUTIC EXERCISES: CPT | Mod: GP | Performed by: PHYSICAL THERAPIST

## 2017-08-10 NOTE — PROGRESS NOTES
Neurosurgery Spine Consult [facility]          CC: LBP and LLE > RLE pain    Primary care Provider: Alexis Morris    Referring provider:   Dejuan Garcia DO  [facility]  [address]   [place] [address]      [place]: Colette Patricia is a 57 year old female that presents to clinic with a complaint of LBP and LLE > RLE pain. She has tried PT and CHEKO x 2 without resolution. She says her pain is worse with activity and also bothers her when in bed. She has a mechanical component to the pain and difficulty with AODL.       Past Medical History:   Diagnosis Date     Other unspecified back disorder        No past surgical history on file.    Current Outpatient Prescriptions   Medication     Acetaminophen (TYLENOL ARTHRITIS PAIN PO)     cholecalciferol 5000 UNITS CAPS     Calcium Carb-Cholecalciferol (CALCIUM 1000 + D) 1000-800 MG-UNIT TABS     cyabnocobalamin (VITAMIN B-12) 2500 MCG sublingual tablet     acetaminophen (TYLENOL) 500 MG tablet     baclofen (LIORESAL) 10 MG tablet     gabapentin (NEURONTIN) 300 MG capsule     ranitidine (ZANTAC) 150 MG tablet     No current facility-administered medications for this visit.        Allergies   Allergen Reactions     No Known Drug Allergies        Social History     Social History     Marital status:      Spouse name: N/A     Number of children: N/A     Years of education: N/A     Social History Main Topics     Smoking status: Never Smoker     Smokeless tobacco: Never Used     Alcohol use No     Drug use: No     Sexual activity: No     Other Topics Concern     None     Social History Narrative       Family History   Problem Relation Age of Onset     Family History Negative Mother      Family History Negative Father          Review Of Systems  Skin: negative  Eyes: negative  Ears/Nose/Throat: negative  Respiratory: No shortness of breath, dyspnea on exertion, cough, or  hemoptysis  Cardiovascular: negative  Gastrointestinal: negative  Genitourinary: negative  Musculoskeletal: as above and back pain  Neurologic: as above  Psychiatric: negative  Hematologic/Lymphatic/Immunologic: negative  Endocrine: negative    B/P: 117/78, T: 98.4, P: 82, R: Data Unavailable    Examination:  Awake  Alert  Oriented x 3  Speech clear  Cranial nerves II - XII intact  Face symmetric  Back nontender  Limited ROM of back  Motor exam nonfocal  Sensation intact  Clonus negative  Negative Lase'darlene's sign  Ambulation stable    Imaging:   MRI lumbar - L5-S1 grade 2 spondylolisthesis with severe bilateral foraminal stenosis and root compression  X-rays - instability between flexion and extension at L5-S1 with bilateral pars defects      Assessment/Plan:   I have recommended a L5-S1 TLIF with possible extension to L4. I discussed with the patient the risk of surgery to include, but, not be limited to; nerve injury, pseudoarthrosis, failure of hardware, failure of improvement of symptoms, CSF leak,  infection, post op hematoma, the need for recurrent surgery, paralysis, coma and death.  She will call if she wants to proceed        Mukund Christianson MD, MS, FAANS  Neurosurgeon  [facility]  [facility]  [id number] [place] [address], [address]  [place] [address]  [phone]

## 2017-08-10 NOTE — PROGRESS NOTES
"[facility] Initial Evaluation -- Lumbar    Date: August 10, 2017  Colette Patricia is a 57 year old female with a low back condition.   Referral: Dr. Garcia  Work mechanical stresses:    Employment status:  n/a  Leisure mechanical stresses: 3 x week walking for exercise  Functional disability score (LILY/STarT Back):  See flow sheet  VAS score (0-10): 7/10  Patient goals/expectations:  \"to try to avoid surgery\"    HISTORY:    Present symptoms: lower back, left leg to left calf  Pain quality (sharp/shooting/stabbing/aching/burning/cramping):  Sharp, aching/dull   Paresthesia (yes/no):  Numbness into L calf    Present since (onset date): ongoing since last ten years, was seen by Dr. Garcia July 26, 2017 with referral to PT    Symptoms (improving/unchanging/worsening):  worsening.     Symptoms commenced as a result of: no apparent reason   Condition occurred in the following environment:   n/a     Symptoms at onset (back/thigh/leg): back  Constant symptoms (back/thigh/leg): back and leg  Intermittent symptoms (back/thigh/leg): n/a    Symptoms are made worse with the following: Always Bending, Always Rising, Always Standing, Always Walking, Always Lying and Time of day - Always AM   Symptoms are made better with the following: Other - pain killers    Disturbed sleep (yes/no):  yes Sleeping postures (prone/sup/side R/L): sides    Previous episodes (0/1-5/6-10/11+): >6 Year of first episode: 2007    Previous history: chronic back pain  Previous treatments: PT, CHEKO were not helpful      Specific Questions:  Cough/Sneeze/Strain (pos/neg): positive  Bowel/Bladder (normal/abnormal): normal  Gait (normal/abnormal):normal  Medications (nil/NSAIDS/analg/steroids/anticoag/other):  NSAIDS   Medical allergies:  none  General health (excellent/good/fair/poor):  good  Pertinent medical history:  None  Imaging (None/Xray/MRI/Other):  MRI/xrays spond  Recent or major surgery (yes/no):  no  Night pain (yes/no): " mechanical  Accidents (yes/no): no  Unexplained weight loss (yes/no): no  Barriers at home: non  Other red flags: none    EXAMINATION    Posture:   Sitting (good/fair/poor): fair  Standing (good/fair/poor):good  Lordosis (red/acc/normal): acc  Correction of posture (better/worse/no effect): worse    Lateral Shift (right/left/nil): nil  Relevant (yes/no):  no  Other Observations: none    Neurological:    Motor deficit:  4+/5 L5/S1  Reflexes:  intact  Sensory deficit:  decr sensation L calf  Dural signs:  +Slump L    Movement Loss:   [place] Mod Min Nil Pain   Flexion   x  pdm   Extension   x  erp   Side Gliding R  x   erp   Side Gliding L  x   erp     Test Movements:   During: produces, abolishes, increases, decreases, no effect, centralizing, peripheralizing   After: better, worse, no better, no worse, no effect, centralized, peripheralized    Pretest symptoms standing:    Symptoms During Symptoms After ROM increased ROM decreased No Effect   FIS        Rep FIS        EIS        Rep EIS        Pretest symptoms lying: L LE, low back    Symptoms During Symptoms After ROM increased ROM decreased No Effect   HAFSA Decreases    Better         Rep HAFSA Decreases, abolish L LE    Better    x     EIL        Rep EIL        If required, pretest symptoms:    Symptoms During Symptoms After ROM increased ROM decreased No Effect   SGIS - R        Rep SGIS - R        SGIS - L        Rep SGIS - L          Static Tests:  Sitting slouched:    Sitting erect:    Standing slouched   Standing erect:    Lying prone in extension:   Long sitting:      Other Tests:     Provisional Classification:  Derangement - Asymmetrical, unilateral, symptoms below knee    Principle of Management:  Education:  Therapeutic dose of exercise, activation of TA during transitional movements   Equipment provided:  none  Mechanical therapy (Y/N):  Y   Extension principle:    Lateral Principle:    Flexion principle:  Rep HAFSA + pt. Op x 10/2 hrs  Other:  TA activation in  supine x 10    ASSESSMENT/PLAN:    Patient is a 57 year old female with lumbar complaints.    Patient has the following significant findings with corresponding treatment plan.                Diagnosis 1:  Lumbar radiculopathy  Pain -  self management, education, directional preference exercise and home program  Decreased ROM/flexibility - manual therapy and therapeutic exercise  Decreased strength - therapeutic exercise and therapeutic activities  Decreased function - therapeutic activities    Therapy Evaluation Codes:   1) History comprised of:   Personal factors that impact the plan of care:      Language.    Comorbidity factors that impact the plan of care are:      None.     Medications impacting care: None.  2) Examination of Body Systems comprised of:   Body structures and functions that impact the plan of care:      Lumbar spine.   Activity limitations that impact the plan of care are:      Bending, Lifting, Standing, Walking and Sleeping.  3) Clinical presentation characteristics are:   Evolving/Changing.  4) Decision-Making    Low complexity using standardized patient assessment instrument and/or measureable assessment of functional outcome.  Cumulative Therapy Evaluation is: Low complexity.    Previous and current functional limitations:  (See Goal Flow Sheet for this information)    Short term and Long term goals: (See Goal Flow Sheet for this information)     Communication ability:  Patient appears to be able to clearly communicate and understand verbal and written communication and follow directions correctly.  Patient has an  for communication clarity.  Treatment Explanation - The following has been discussed with the patient:   RX ordered/plan of care  Anticipated outcomes  Possible risks and side effects  This patient would benefit from PT intervention to resume normal activities.   Rehab potential is good.    Frequency:  1 X week, once daily  Duration:  for 6 weeks  Discharge Plan:   Achieve all LTG.  Independent in home treatment program.  Reach maximal therapeutic benefit.    Please refer to the daily flowsheet for treatment today, total treatment time and time spent performing 1:1 timed codes.

## 2017-08-10 NOTE — LETTER
"DEPARTMENT OF HEALTH AND HUMAN SERVICES  CENTERS FOR MEDICARE & MEDICAID SERVICES    PLAN/UPDATED PLAN OF PROGRESS FOR OUTPATIENT REHABILITATION    PATIENTS NAME:  Colette Patricia   : 1960  PROVIDER NUMBER:    [id number]  [id number]  PROVIDER NAME: [facility]  MEDICAL RECORD NUMBER: [id number]   START OF CARE DATE:  SOC Date: 08/10/17   TYPE:  PT  PRIMARY/TREATMENT DIAGNOSIS: (Pertinent Medical Diagnosis)  VISITS FROM START OF CARE:  Rxs Used: 1   [facility] Initial Evaluation -- Lumbar  Date: August 10, 2017  Cloette Patricia is a 57 year old female with a low back condition.   Referral: Dr. Garcia  Work mechanical stresses:    Employment status:  n/a  Leisure mechanical stresses: 3 x week walking for exercise  Functional disability score (LILY/STarT Back):  See flow sheet  VAS score (0-10): 7/10  Patient goals/expectations:  \"to try to avoid surgery\"  HISTORY:  Present symptoms: lower back, left leg to left calf  Pain quality (sharp/shooting/stabbing/aching/burning/cramping):  Sharp, aching/dull   Paresthesia (yes/no):  Numbness into L calf  Present since (onset date): ongoing since last ten years, was seen by Dr. Garcia 2017 with referral to PT    Symptoms (improving/unchanging/worsening):  worsening.   Symptoms commenced as a result of: no apparent reason   Condition occurred in the following environment:   n/a   Symptoms at onset (back/thigh/leg): back  Constant symptoms (back/thigh/leg): back and leg  Intermittent symptoms (back/thigh/leg): n/a  Symptoms are made worse with the following: Always Bending, Always Rising, Always Standing, Always Walking, Always Lying and Time of day - Always AM   Symptoms are made better with the following: Other - pain killers  Disturbed sleep (yes/no):  yes Sleeping postures (prone/sup/side R/L): sides  Previous episodes (0/1-5/6-10/+): >6 Year of first episode:   Previous history: chronic back pain  Previous treatments: " PT, CHEKO were not helpful  Specific Questions:  Cough/Sneeze/Strain (pos/neg): positive  Bowel/Bladder (normal/abnormal): normal  Gait (normal/abnormal):normal  Medications (nil/NSAIDS/analg/steroids/anticoag/other):  NSAIDS   Medical allergies:  none  General health (excellent/good/fair/poor):  good  Pertinent medical history:  None  Imaging (None/Xray/MRI/Other):  MRI/xrays spond  Recent or major surgery (yes/no):  no  Night pain (yes/no): mechanical  Accidents (yes/no): no  Unexplained weight loss (yes/no): no  Barriers at home: non  Other red flags: none  PATIENTS NAME:  Colette Patricia   : 1960    EXAMINATION  Posture:   Sitting (good/fair/poor): fair  Standing (good/fair/poor):good  Lordosis (red/acc/normal): acc  Correction of posture (better/worse/no effect): worse  Lateral Shift (right/left/nil): nil  Relevant (yes/no):  no  Other Observations: none  Neurological:  Motor deficit:  4+/5 L5/S1  Reflexes:  intact  Sensory deficit:  decr sensation L calf  Dural signs:  +Slump L  Movement Loss:   [place] Mod Min Nil Pain   Flexion   x  pdm   Extension   x  erp   Side Gliding R  x   erp   Side Gliding L  x   erp   Test Movements:   During: produces, abolishes, increases, decreases, no effect, centralizing, peripheralizing   After: better, worse, no better, no worse, no effect, centralized, peripheralized  Pretest symptoms standing:    Symptoms During Symptoms After ROM increased ROM decreased No Effect   FIS        Rep FIS        EIS        Rep EIS        Pretest symptoms lying: L LE, low back    Symptoms During Symptoms After ROM increased ROM decreased No Effect   HAFSA Decreases    Better         Rep HAFSA Decreases, abolish L LE    Better    x     EIL        Rep EIL        If required, pretest symptoms:    Symptoms During Symptoms After ROM increased ROM decreased No Effect   SGIS - R        Rep SGIS - R        SGIS - L        Rep SGIS - L          Static Tests:  Sitting slouched:    Sitting erect:    Standing  slouched   Standing erect:    Lying prone in extension:   Long sitting:      Other Tests:     Provisional Classification:  Derangement - Asymmetrical, unilateral, symptoms below knee    PATIENTS NAME:  Colette Patricia   : 1960    Principle of Management:  Education:  Therapeutic dose of exercise, activation of TA during transitional movements   Equipment provided:  none  Mechanical therapy (Y/N):  Y   Extension principle:    Lateral Principle:    Flexion principle:  Rep HAFSA + pt. Op x 10/2 hrs  Other:  TA activation in supine x 10  ASSESSMENT/PLAN:  Patient is a 57 year old female with lumbar complaints.    Patient has the following significant findings with corresponding treatment plan.                Diagnosis 1:  Lumbar radiculopathy  Pain -  self management, education, directional preference exercise and home program  Decreased ROM/flexibility - manual therapy and therapeutic exercise  Decreased strength - therapeutic exercise and therapeutic activities  Decreased function - therapeutic activities    Therapy Evaluation Codes:   1) History comprised of:   Personal factors that impact the plan of care:      Language.    Comorbidity factors that impact the plan of care are:      None.     Medications impacting care: None.  2) Examination of Body Systems comprised of:   Body structures and functions that impact the plan of care:      Lumbar spine.   Activity limitations that impact the plan of care are:      Bending, Lifting, Standing, Walking and Sleeping.  3) Clinical presentation characteristics are:   Evolving/Changing.  4) Decision-Making    Low complexity using standardized patient assessment instrument and/or measureable assessment of functional outcome.  Cumulative Therapy Evaluation is: Low complexity.    Previous and current functional limitations:  (See Goal Flow Sheet for this information)    Short term and Long term goals: (See Goal Flow Sheet for this information)     Communication ability:  Patient  "appears to be able to clearly communicate and understand verbal and written communication and follow directions correctly.  Patient has an  for communication clarity.  Treatment Explanation - The following has been discussed with the patient:   RX ordered/plan of care  Anticipated outcomes  Possible risks and side effects                        PATIENTS NAME:  Colette Patricia   : 1960    This patient would benefit from PT intervention to resume normal activities.   Rehab potential is good.    Frequency:  1 X week, once daily  Duration:  for 6 weeks  Discharge Plan:  Achieve all LTG.  Independent in home treatment program.  Reach maximal therapeutic benefit.    Caregiver Signature/Credentials _____________________________ Date ________      Treating Provider: Heidi Garcia, PT, Cert, MDT    I have reviewed and certified the need for these services and plan of treatment while under my care.        PHYSICIAN'S SIGNATURE:   _____________________________________ Date___________     Dejuan Garcia    Certification period:  Beginning of Cert date period: 08/10/17 to  End of Cert period date: 17     Functional Level Progress Report: Please see attached \"Goal Flow sheet for Functional level.\"    ____X____ Continue Services or       ________ DC Services                Service dates: From  SOC Date: 08/10/17 date to present                       "

## 2017-08-18 ENCOUNTER — THERAPY VISIT (OUTPATIENT)
Dept: PHYSICAL THERAPY | Facility: CLINIC | Age: 57
End: 2017-08-18
Payer: MEDICARE

## 2017-08-18 DIAGNOSIS — M54.42 CHRONIC LEFT-SIDED LOW BACK PAIN WITH LEFT-SIDED SCIATICA: ICD-10-CM

## 2017-08-18 DIAGNOSIS — G89.29 CHRONIC LEFT-SIDED LOW BACK PAIN WITH LEFT-SIDED SCIATICA: ICD-10-CM

## 2017-08-18 PROCEDURE — 97110 THERAPEUTIC EXERCISES: CPT | Mod: GP | Performed by: PHYSICAL THERAPIST

## 2017-08-18 PROCEDURE — 97112 NEUROMUSCULAR REEDUCATION: CPT | Mod: GP | Performed by: PHYSICAL THERAPIST

## 2017-08-21 DIAGNOSIS — M54.16 LUMBAR RADICULAR PAIN: Primary | ICD-10-CM

## 2017-08-25 ENCOUNTER — THERAPY VISIT (OUTPATIENT)
Dept: PHYSICAL THERAPY | Facility: CLINIC | Age: 57
End: 2017-08-25
Payer: MEDICARE

## 2017-08-25 DIAGNOSIS — G89.29 CHRONIC LEFT-SIDED LOW BACK PAIN WITH LEFT-SIDED SCIATICA: ICD-10-CM

## 2017-08-25 DIAGNOSIS — M54.42 CHRONIC LEFT-SIDED LOW BACK PAIN WITH LEFT-SIDED SCIATICA: ICD-10-CM

## 2017-08-25 PROCEDURE — 97110 THERAPEUTIC EXERCISES: CPT | Mod: GP | Performed by: PHYSICAL THERAPIST

## 2017-08-25 PROCEDURE — 97112 NEUROMUSCULAR REEDUCATION: CPT | Mod: GP | Performed by: PHYSICAL THERAPIST

## 2017-08-31 ENCOUNTER — THERAPY VISIT (OUTPATIENT)
Dept: PHYSICAL THERAPY | Facility: CLINIC | Age: 57
End: 2017-08-31
Payer: MEDICARE

## 2017-08-31 DIAGNOSIS — M54.42 CHRONIC LEFT-SIDED LOW BACK PAIN WITH LEFT-SIDED SCIATICA: ICD-10-CM

## 2017-08-31 DIAGNOSIS — G89.29 CHRONIC LEFT-SIDED LOW BACK PAIN WITH LEFT-SIDED SCIATICA: ICD-10-CM

## 2017-08-31 PROCEDURE — 97112 NEUROMUSCULAR REEDUCATION: CPT | Mod: GP | Performed by: PHYSICAL THERAPIST

## 2017-08-31 PROCEDURE — 97110 THERAPEUTIC EXERCISES: CPT | Mod: GP | Performed by: PHYSICAL THERAPIST

## 2017-09-08 ENCOUNTER — THERAPY VISIT (OUTPATIENT)
Dept: PHYSICAL THERAPY | Facility: CLINIC | Age: 57
End: 2017-09-08
Payer: MEDICARE

## 2017-09-08 DIAGNOSIS — M54.42 CHRONIC LEFT-SIDED LOW BACK PAIN WITH LEFT-SIDED SCIATICA: ICD-10-CM

## 2017-09-08 DIAGNOSIS — G89.29 CHRONIC LEFT-SIDED LOW BACK PAIN WITH LEFT-SIDED SCIATICA: ICD-10-CM

## 2017-09-08 PROCEDURE — 97112 NEUROMUSCULAR REEDUCATION: CPT | Mod: GP | Performed by: PHYSICAL THERAPIST

## 2017-09-08 PROCEDURE — 97110 THERAPEUTIC EXERCISES: CPT | Mod: GP | Performed by: PHYSICAL THERAPIST

## 2017-09-15 ENCOUNTER — THERAPY VISIT (OUTPATIENT)
Dept: PHYSICAL THERAPY | Facility: CLINIC | Age: 57
End: 2017-09-15
Payer: MEDICARE

## 2017-09-15 DIAGNOSIS — M54.42 CHRONIC LEFT-SIDED LOW BACK PAIN WITH LEFT-SIDED SCIATICA: ICD-10-CM

## 2017-09-15 DIAGNOSIS — G89.29 CHRONIC LEFT-SIDED LOW BACK PAIN WITH LEFT-SIDED SCIATICA: ICD-10-CM

## 2017-09-15 PROCEDURE — 97112 NEUROMUSCULAR REEDUCATION: CPT | Mod: GP | Performed by: PHYSICAL THERAPIST

## 2017-09-15 PROCEDURE — 97110 THERAPEUTIC EXERCISES: CPT | Mod: GP | Performed by: PHYSICAL THERAPIST

## 2017-09-15 NOTE — MR AVS SNAPSHOT
After Visit Summary   9/15/2017    Colette Patricia    MRN: [id number]           Patient Information     Date Of Birth          1960        Visit Information        Provider Department      9/15/2017 10:45 AM Heidi Garcia, PT; [employer] [facility]        Today's Diagnoses     Chronic left-sided low back pain with left-sided sciatica           Follow-ups after your visit        Your next 10 appointments already scheduled     Sep 29, 2017 11:00 AM CDT   [facility] Heidi Garcia PT   [facility]    [address]  [address]  [facility] [address]   [phone]            Oct 13, 2017 11:00 AM CDT   [facility] with ZACHARY Pool PT ([facility] [place]  )    [address]  [address] [place] [address]   [phone]            Oct 16, 2017  9:00 AM CDT   LAB with [facility]   [facility]    [address] [address] [phone]           Patient must bring picture ID. Patient should be prepared to give a urine specimen  Please do not eat 10-12 hours before your appointment if you are coming in fasting for labs on lipids, cholesterol, or glucose (sugar). Pregnant women should follow their Care Team instructions. Water with medications is okay. Do not drink coffee or other fluids. If you have concerns about taking  your medications, please ask at office or if scheduling via [facility], send a message by clicking on Secure Messaging, Message Your Care Team.            Oct 23, 2017 10:00 AM CDT   Office Visit with Joel Santoro MD   [facility] ([facility])    [address] [address] [phone]           Bring a current list of meds and any records pertaining to this visit. For Physicals, please bring immunization records and any forms needing to be filled  "out. Please arrive 10 minutes early to complete paperwork.              Who to contact     If you have questions or need follow up information about today's clinic visit or your schedule please contact [facility] directly at [phone].  Normal or non-critical lab and imaging results will be communicated to you by [employer], letter or phone within 4 business days after the clinic has received the results. If you do not hear from us within 7 days, please contact the clinic through [employer] or phone. If you have a critical or abnormal lab result, we will notify you by phone as soon as possible.  Submit refill requests through [employer] or call your pharmacy and they will forward the refill request to us. Please allow 3 business days for your refill to be completed.          Additional Information About Your Visit        [employer] Information     [employer] lets you send messages to your doctor, view your test results, renew your prescriptions, schedule appointments and more. To sign up, go to [email or web address] . Click on \"Log in\" on the left side of the screen, which will take you to the Welcome page. Then click on \"Sign up Now\" on the right side of the page.     You will be asked to enter the access code listed below, as well as some personal information. Please follow the directions to create your username and password.     Your access code is: [email or web address]  Expires: 10/19/2017 12:19 PM     Your access code will  in 90 days. If you need help or a new code, please call your [facility] clinic or [phone].        Care EveryWhere ID     This is your Care EveryWhere ID. This could be used by other organizations to access your [facility] medical records  [id number]        Your Vitals Were     Last Period                   2000            Blood Pressure from Last 3 Encounters:   17 117/78   17 112/70   17 110/70    Weight from Last 3 Encounters:   17 75.3 kg (166 lb)   17 " 75.3 kg (166 lb)   07/21/17 75.3 kg (166 lb)              We Performed the Following     NEUROMUSCULAR RE-EDUCATION     THERAPEUTIC EXERCISES        Primary Care Provider Office Phone # Fax #    Alexis Morris [phone]       XXX RESIGNED  [address]  [facility] [address]        Equal Access to Services     [facility]: Hadii aad ku hadasho Soomaali, waaxda luqadaha, qaybta kaalmada adeegyada, waxay idiin hayaan adeeg khkristi lajoseph . So [facility] [phone].    ATENCIÓN: Si habla español, tiene a arroyo disposición servicios gratuitos de asistencia lingüística. Llame al [phone].    We comply with applicable federal civil rights laws and [place] laws. We do not discriminate on the basis of race, color, national origin, age, disability sex, sexual orientation or gender identity.            Thank you!     Thank you for choosing [facility]  for your care. Our goal is always to provide you with excellent care. Hearing back from our patients is one way we can continue to improve our services. Please take a few minutes to complete the written survey that you may receive in the mail after your visit with us. Thank you!             Your Updated Medication List - Protect others around you: Learn how to safely use, store and throw away your medicines at www.disposemymeds.org.          This list is accurate as of: 9/15/17 12:50 PM.  Always use your most recent med list.                   Brand Name Dispense Instructions for use Diagnosis    baclofen 10 MG tablet    LIORESAL    60 tablet    take 1 tab in am, 1 tab in the afternoon and 2 tabs at bed time    Chronic left-sided low back pain with left-sided sciatica       Calcium Carb-Cholecalciferol 1000-800 MG-UNIT Tabs    CALCIUM 1000 + D    100 tablet    Take 1 tablet by mouth daily TAKE WITH FOOD, FOR BONE HEALTH AND FOR VITAMIN D SUPPLEMENTATION    Vitamin D deficiency       cholecalciferol 5000 UNITS Caps     100 capsule     Take 1 capsule (5,000 Units) by mouth daily FOR VITAMIN D DEFICIENCY (LOW VITAMIN D)    Vitamin D deficiency       cyabnocobalamin 2500 MCG sublingual tablet    VITAMIN B-12    100 tablet    Take 2,500 mcg by mouth daily INDICATION: FOR VITAMIN B12 SUPPLEMENTATION - TO IMPROVE MEMORY, BALANCE, SLEEP AND MOOD, DIRECTIONS: PLEASE PLACE UNDER THE TONGUE TO DISSOLVE    Fatigue, unspecified type       gabapentin 300 MG capsule    NEURONTIN    90 capsule    Take 1 capsule (300 mg) by mouth At Bedtime    Foraminal stenosis of lumbosacral region       order for DME     1 Units    Lumbar support    Lumbar radicular pain       ranitidine 150 MG tablet    ZANTAC    180 tablet    Take 1 tablet (150 mg) by mouth 2 times daily    Gastroesophageal reflux disease without esophagitis       * TYLENOL ARTHRITIS PAIN PO      Take 500 mg by mouth        * acetaminophen 500 MG tablet    TYLENOL    100 tablet    Take 2 tablets (1,000 mg) by mouth every 8 hours    Chronic left-sided low back pain with left-sided sciatica       * Notice:  This list has 2 medication(s) that are the same as other medications prescribed for you. Read the directions carefully, and ask your doctor or other care provider to review them with you.

## 2017-09-29 ENCOUNTER — THERAPY VISIT (OUTPATIENT)
Dept: PHYSICAL THERAPY | Facility: CLINIC | Age: 57
End: 2017-09-29
Payer: MEDICARE

## 2017-09-29 DIAGNOSIS — M54.42 CHRONIC LEFT-SIDED LOW BACK PAIN WITH LEFT-SIDED SCIATICA: ICD-10-CM

## 2017-09-29 DIAGNOSIS — G89.29 CHRONIC LEFT-SIDED LOW BACK PAIN WITH LEFT-SIDED SCIATICA: ICD-10-CM

## 2017-09-29 PROCEDURE — 97112 NEUROMUSCULAR REEDUCATION: CPT | Mod: GP | Performed by: PHYSICAL THERAPIST

## 2017-09-29 PROCEDURE — 97110 THERAPEUTIC EXERCISES: CPT | Mod: GP | Performed by: PHYSICAL THERAPIST

## 2017-10-13 ENCOUNTER — THERAPY VISIT (OUTPATIENT)
Dept: PHYSICAL THERAPY | Facility: CLINIC | Age: 57
End: 2017-10-13
Payer: MEDICARE

## 2017-10-13 DIAGNOSIS — M54.42 CHRONIC LEFT-SIDED LOW BACK PAIN WITH LEFT-SIDED SCIATICA: ICD-10-CM

## 2017-10-13 DIAGNOSIS — G89.29 CHRONIC LEFT-SIDED LOW BACK PAIN WITH LEFT-SIDED SCIATICA: ICD-10-CM

## 2017-10-13 PROCEDURE — G8979 MOBILITY GOAL STATUS: HCPCS | Mod: GP | Performed by: PHYSICAL THERAPIST

## 2017-10-13 PROCEDURE — 97110 THERAPEUTIC EXERCISES: CPT | Mod: GP | Performed by: PHYSICAL THERAPIST

## 2017-10-13 PROCEDURE — G8980 MOBILITY D/C STATUS: HCPCS | Mod: GP | Performed by: PHYSICAL THERAPIST

## 2017-10-13 PROCEDURE — 97112 NEUROMUSCULAR REEDUCATION: CPT | Mod: GP | Performed by: PHYSICAL THERAPIST

## 2017-10-13 NOTE — PROGRESS NOTES
Subjective:    HPI  Oswestry Score: 26 %                 Objective:    System    Physical Exam    General     ROS    Assessment/Plan:      DISCHARGE REPORT    Progress reporting period is from 8-10-17 to 10-13-17.       SUBJECTIVE  Subjective changes noted by patient:  .  Subjective: Worse week for her--compliance is still good.  Worse with walking/standinng/prolonged sitting.     Current pain level is 6/10 Current Pain level: 6/10.     Previous pain level was  7/10 Initial Pain level: 7/10.   Changes in function:  Yes (See Goal flowsheet attached for changes in current functional level)  Adverse reaction to treatment or activity: None    OBJECTIVE  Changes noted in objective findings:  The objective findings below are from DOS 10-13-17.  Objective: LILY=26, STarT Back Tool=MEDIUM: pt. is independent in core strengthening program and advised to continued regularly for life.      ASSESSMENT/PLAN  Updated problem list and treatment plan: Diagnosis 1:  Chronic left-sided low back pain w/left sided sciatica  Pain -  self management, education, directional preference exercise and home program  Decreased strength - therapeutic exercise and therapeutic activities  Decreased function - therapeutic activities  STG/LTGs have been met or progress has been made towards goals:  Progress made toward goals but not fully achieved  Assessment of Progress: The patient's progress has plateaued.  Self Management Plans:  Patient is independent in a home treatment program.  Patient is independent in self management of symptoms.  I have re-evaluated this patient and find that the nature, scope, duration and intensity of the therapy is appropriate for the medical condition of the patient.  Ardo continues to require the following intervention to meet STG and LTG's:  PT intervention is no longer required to meet STG/LTG.    Recommendations:  This patient is ready to be discharged from therapy and continue their home treatment  program.    Please refer to the daily flowsheet for treatment today, total treatment time and time spent performing 1:1 timed codes.

## 2017-10-13 NOTE — MR AVS SNAPSHOT
After Visit Summary   10/13/2017    Colette Patricia    MRN: [id number]           Patient Information     Date Of Birth          1960        Visit Information        Provider Department      10/13/2017 10:45 AM Heidi Garcia, PT; [employer] [facility]        Today's Diagnoses     Chronic left-sided low back pain with left-sided sciatica           Follow-ups after your visit        Your next 10 appointments already scheduled     Oct 16, 2017  9:00 AM CDT   LAB with [facility]   [address] [id number] [phone]           Patient must bring picture ID. Patient should be prepared to give a urine specimen  Please do not eat 10-12 hours before your appointment if you are coming in fasting for labs on lipids, cholesterol, or glucose (sugar). Pregnant women should follow their Care Team instructions. Water with medications is okay. Do not drink coffee or other fluids. If you have concerns about taking  your medications, please ask at office or if scheduling via [employer], send a message by clicking on Secure Messaging, Message Your Care Team.            Oct 23, 2017  9:45 AM CDT   Office Visit with Joel Santoro MD   [facility]    [address] [place] [id number] [phone]           Bring a current list of meds and any records pertaining to this visit. For Physicals, please bring immunization records and any forms needing to be filled out. Please arrive 10 minutes early to complete paperwork.              Who to contact     If you have questions or need follow up information about today's clinic visit or your schedule please contact [facility] directly at [phone].  Normal or non-critical lab and imaging results will be communicated to you by MyChart, letter or phone within 4  "business days after the clinic has received the results. If you do not hear from us within 7 days, please contact the clinic through [employer] or phone. If you have a critical or abnormal lab result, we will notify you by phone as soon as possible.  Submit refill requests through [employer] or call your pharmacy and they will forward the refill request to us. Please allow 3 business days for your refill to be completed.          Additional Information About Your Visit        [employer] Information     [employer] lets you send messages to your doctor, view your test results, renew your prescriptions, schedule appointments and more. To sign up, go to [email or web address] . Click on \"Log in\" on the left side of the screen, which will take you to the Welcome page. Then click on \"Sign up Now\" on the right side of the page.     You will be asked to enter the access code listed below, as well as some personal information. Please follow the directions to create your username and password.     Your access code is: [email or web address]  Expires: 10/19/2017 12:19 PM     Your access code will  in 90 days. If you need help or a new code, please call your [place] clinic or [phone].        Care EveryWhere ID     This is your Care EveryWhere ID. This could be used by other organizations to access your [place] medical records  [id number]        Your Vitals Were     Last Period                   2000            Blood Pressure from Last 3 Encounters:   17 117/78   17 112/70   17 110/70    Weight from Last 3 Encounters:   17 75.3 kg (166 lb)   17 75.3 kg (166 lb)   17 75.3 kg (166 lb)              We Performed the Following     NEUROMUSCULAR RE-EDUCATION     THERAPEUTIC EXERCISES        Primary Care Provider Office Phone # Fax #    Alexis Morris [phone]       XXX RESIGNED  [address] [facility] [address]  [facility] [address]        Equal Access to Services     ELYSSA SELLERS AH: " Hadii aad ku hadsmithao Soomaali, waaxda luqadaha, qaybta kaalmada juana, amber viktravon rodriguez. So [facility] [phone].    ATENCIÓN: Si john grewal, tiene a arroyo disposición servicios gratuitos de asistencia lingüística. Katinaame al [phone].    We comply with applicable federal civil rights laws and [place] laws. We do not discriminate on the basis of race, color, national origin, age, disability, sex, sexual orientation, or gender identity.            Thank you!     Thank you for choosing [facility]  for your care. Our goal is always to provide you with excellent care. Hearing back from our patients is one way we can continue to improve our services. Please take a few minutes to complete the written survey that you may receive in the mail after your visit with us. Thank you!             Your Updated Medication List - Protect others around you: Learn how to safely use, store and throw away your medicines at www.disposemymeds.org.          This list is accurate as of: 10/13/17 12:50 PM.  Always use your most recent med list.                   Brand Name Dispense Instructions for use Diagnosis    baclofen 10 MG tablet    LIORESAL    60 tablet    take 1 tab in am, 1 tab in the afternoon and 2 tabs at bed time    Chronic left-sided low back pain with left-sided sciatica       Calcium Carb-Cholecalciferol 1000-800 MG-UNIT Tabs    CALCIUM 1000 + D    100 tablet    Take 1 tablet by mouth daily TAKE WITH FOOD, FOR BONE HEALTH AND FOR VITAMIN D SUPPLEMENTATION    Vitamin D deficiency       cholecalciferol 5000 UNITS Caps     100 capsule    Take 1 capsule (5,000 Units) by mouth daily FOR VITAMIN D DEFICIENCY (LOW VITAMIN D)    Vitamin D deficiency       cyabnocobalamin 2500 MCG sublingual tablet    VITAMIN B-12    100 tablet    Take 2,500 mcg by mouth daily INDICATION: FOR VITAMIN B12 SUPPLEMENTATION - TO IMPROVE MEMORY, BALANCE, SLEEP AND MOOD, DIRECTIONS: PLEASE PLACE UNDER THE TONGUE TO  DISSOLVE    Fatigue, unspecified type       gabapentin 300 MG capsule    NEURONTIN    90 capsule    Take 1 capsule (300 mg) by mouth At Bedtime    Foraminal stenosis of lumbosacral region       order for DME     1 Units    Lumbar support    Lumbar radicular pain       ranitidine 150 MG tablet    ZANTAC    180 tablet    Take 1 tablet (150 mg) by mouth 2 times daily    Gastroesophageal reflux disease without esophagitis       * TYLENOL ARTHRITIS PAIN PO      Take 500 mg by mouth        * acetaminophen 500 MG tablet    TYLENOL    100 tablet    Take 2 tablets (1,000 mg) by mouth every 8 hours    Chronic left-sided low back pain with left-sided sciatica       * Notice:  This list has 2 medication(s) that are the same as other medications prescribed for you. Read the directions carefully, and ask your doctor or other care provider to review them with you.

## 2018-02-19 ENCOUNTER — OFFICE VISIT (OUTPATIENT)
Dept: INTERNAL MEDICINE | Facility: CLINIC | Age: 58
End: 2018-02-19
Payer: MEDICARE

## 2018-02-19 VITALS
HEIGHT: 66 IN | OXYGEN SATURATION: 100 % | DIASTOLIC BLOOD PRESSURE: 82 MMHG | WEIGHT: 164.6 LBS | HEART RATE: 92 BPM | BODY MASS INDEX: 26.45 KG/M2 | SYSTOLIC BLOOD PRESSURE: 121 MMHG | TEMPERATURE: 98.4 F

## 2018-02-19 DIAGNOSIS — Z00.00 ROUTINE GENERAL MEDICAL EXAMINATION AT A HEALTH CARE FACILITY: Primary | ICD-10-CM

## 2018-02-19 DIAGNOSIS — E78.2 MIXED HYPERLIPIDEMIA: ICD-10-CM

## 2018-02-19 DIAGNOSIS — L29.9 PRURITUS: ICD-10-CM

## 2018-02-19 DIAGNOSIS — R53.83 FATIGUE, UNSPECIFIED TYPE: ICD-10-CM

## 2018-02-19 DIAGNOSIS — K21.9 GASTROESOPHAGEAL REFLUX DISEASE WITHOUT ESOPHAGITIS: ICD-10-CM

## 2018-02-19 DIAGNOSIS — M48.07 FORAMINAL STENOSIS OF LUMBOSACRAL REGION: ICD-10-CM

## 2018-02-19 DIAGNOSIS — M85.88 OSTEOPENIA OF SPINE: ICD-10-CM

## 2018-02-19 LAB
BASOPHILS # BLD AUTO: 0 10E9/L (ref 0–0.2)
BASOPHILS NFR BLD AUTO: 0.2 %
DIFFERENTIAL METHOD BLD: NORMAL
EOSINOPHIL # BLD AUTO: 0.2 10E9/L (ref 0–0.7)
EOSINOPHIL NFR BLD AUTO: 4 %
ERYTHROCYTE [DISTWIDTH] IN BLOOD BY AUTOMATED COUNT: 13.4 % (ref 10–15)
HCT VFR BLD AUTO: 37.7 % (ref 35–47)
HGB BLD-MCNC: 12.3 G/DL (ref 11.7–15.7)
LYMPHOCYTES # BLD AUTO: 2 10E9/L (ref 0.8–5.3)
LYMPHOCYTES NFR BLD AUTO: 35.2 %
MCH RBC QN AUTO: 30.8 PG (ref 26.5–33)
MCHC RBC AUTO-ENTMCNC: 32.6 G/DL (ref 31.5–36.5)
MCV RBC AUTO: 95 FL (ref 78–100)
MONOCYTES # BLD AUTO: 0.6 10E9/L (ref 0–1.3)
MONOCYTES NFR BLD AUTO: 10.9 %
NEUTROPHILS # BLD AUTO: 2.9 10E9/L (ref 1.6–8.3)
NEUTROPHILS NFR BLD AUTO: 49.7 %
PLATELET # BLD AUTO: 214 10E9/L (ref 150–450)
RBC # BLD AUTO: 3.99 10E12/L (ref 3.8–5.2)
WBC # BLD AUTO: 5.8 10E9/L (ref 4–11)

## 2018-02-19 PROCEDURE — 80050 GENERAL HEALTH PANEL: CPT | Performed by: INTERNAL MEDICINE

## 2018-02-19 PROCEDURE — 80061 LIPID PANEL: CPT | Performed by: INTERNAL MEDICINE

## 2018-02-19 PROCEDURE — 99213 OFFICE O/P EST LOW 20 MIN: CPT | Mod: 25 | Performed by: INTERNAL MEDICINE

## 2018-02-19 PROCEDURE — G0439 PPPS, SUBSEQ VISIT: HCPCS | Performed by: INTERNAL MEDICINE

## 2018-02-19 PROCEDURE — 36415 COLL VENOUS BLD VENIPUNCTURE: CPT | Performed by: INTERNAL MEDICINE

## 2018-02-19 NOTE — NURSING NOTE
The hepatitis B Core Antibody, Hepatitis B Surface Antigen, and Hepatitis B Surface Antibody could not be added to previous blood draw this morning. A new specimen needs to be collected.Future orders has been pended for your review. If you want the patient to return please sign the orders and have your care team contact the patient to make a lab only appointment. Thank you. Sivan

## 2018-02-19 NOTE — PROGRESS NOTES
SUBJECTIVE:   CC: Colette Patricia is an 57 year old woman who presents for preventive health visit.     Healthy Habits:    Do you get at least three servings of calcium containing foods daily (dairy, green leafy vegetables, etc.)? No.    Amount of exercise or daily activities, outside of work: No    Problems taking medications regularly No    Medication side effects: No    Have you had an eye exam in the past two years? no    Do you see a dentist twice per year? yes    Do you have sleep apnea, excessive snoring or daytime drowsiness?no      She is avoiding fried foods, and fast foods. No butter.  Using mostly olive oil    Lumbarsacral pain  History of spondylolisthesis of lumbosacral region  Based on lumbar MRI from 7/17, Dr. Christianson recommened L5-S1 TLIF but patient did not want surgery.  Wants to think about it or get a second opinion    Also notes whole body pruritus starting a few weeks ago, no rash she noted.    DEXA showing osteopenia, not taking ca-VitD supplements    Patient has not had mammo done for several years, prefers not to get this done       Today's PHQ-2 Score:   PHQ-2 ( 1999 Pf[employer]) 2/19/2018 12/2/2016   Q1: Little interest or pleasure in doing things 0 0   Q2: Feeling down, depressed or hopeless 0 0   PHQ-2 Score 0 0       Abuse: Current or Past(Physical, Sexual or Emotional)- No  Do you feel safe in your environment - Yes    Social History   Substance Use Topics     Smoking status: Never Smoker     Smokeless tobacco: Never Used     Alcohol use No     If you drink alcohol do you typically have >3 drinks per day or >7 drinks per week? No                     Reviewed orders with patient.  Reviewed health maintenance and updated orders accordingly - Yes  Patient Active Problem List   Diagnosis     Headache     Insomnia     Generalized osteoarthrosis, unspecified site     Other unspecified back disorder     Low back pain     GERD (gastroesophageal reflux disease)     Advanced directives,  "counseling/discussion     Mixed hyperlipidemia     Hyperlipidemia with target LDL less than 130     Gastroesophageal reflux disease, esophagitis presence not specified     Chronic left-sided low back pain without sciatica     Gastroesophageal reflux disease without esophagitis     Foraminal stenosis of lumbosacral region     Fatigue, unspecified type     Vitamin D deficiency     History reviewed. No pertinent surgical history.    Social History   Substance Use Topics     Smoking status: Never Smoker     Smokeless tobacco: Never Used     Alcohol use No     Family History   Problem Relation Age of Onset     Family History Negative Mother      Family History Negative Father            Mammo discussed, not appropriate for or declined by this patient.    Pertinent mammograms are reviewed under the imaging tab.  History of abnormal Pap smear: NO - age 30- 65 PAP every 3 years recommended    Reviewed and updated as needed this visit by clinical staff  Tobacco  Allergies  Med Hx  Surg Hx  Fam Hx  Soc Hx        Reviewed and updated as needed this visit by Provider            ROS:  C: +fatigue  I: NEGATIVE for worrisome rashes, moles or lesions  E: NEGATIVE for vision changes or irritation  ENT: NEGATIVE for ear, mouth and throat problems  R: NEGATIVE for significant cough or SOB  B: NEGATIVE for masses, tenderness or discharge  CV: NEGATIVE for chest pain, palpitations or peripheral edema  GI: +constipation  : NEGATIVE for unusual urinary or vaginal symptoms. Periods are regular.  M: NEGATIVE for significant arthralgias or myalgia  N: +numbness/tingling down left leg  P: NEGATIVE for changes in mood or affect    OBJECTIVE:   /82 (BP Location: Right arm, Patient Position: Sitting, Cuff Size: Adult Regular)  Pulse 92  Temp 98.4  F (36.9  C) (Oral)  Ht 5' 6\" (1.676 m)  Wt 164 lb 9.6 oz (74.7 kg)  LMP 09/23/2004  SpO2 100%  BMI 26.57 kg/m2  EXAM:  GENERAL: healthy, alert and no distress  EYES: Eyes grossly " normal to inspection, PERRL and conjunctivae and sclerae normal  NECK: no adenopathy, no asymmetry, masses, or scars and thyroid normal to palpation  RESP: lungs clear to auscultation - no rales, rhonchi or wheezes  CV: regular rate and rhythm, normal S1 S2, no S3 or S4, no murmur, click or rub, no peripheral edema and peripheral pulses strong  ABDOMEN: soft, nontender, no hepatosplenomegaly, no masses and bowel sounds normal  MS: no gross musculoskeletal defects noted, no edema  SKIN: no suspicious lesions or rashes  BACK:  +straight leg test on left  NEURO: Normal strength and tone, mentation intact and speech normal. Gait normal, heel to toe, and toe walking normal  PSYCH: mentation appears normal, affect normal/bright    ASSESSMENT/PLAN:     Z00.00) Routine general medical examination at a health care facility  (primary encounter diagnosis)  Comment: Patient here to establish care as previous doctor went elsewhere  Plan: see below    (M99.83) Foraminal stenosis of lumbosacral region  Comment: Patient has history of foraminal stenosis , and spondylosthesis.  Would like second opinion from Dr. Witt in regards to spine surgery  Plan: ORTHO  REFERRAL            (L29.9) Pruritus  Comment: Patient has generalized pruritus.  Will check liver, kidney function.  She has +Hep B core Ab suggesting past infection, will recheck levels including Hep B antigen bc if positive this may be contributing  Plan: Comprehensive metabolic panel        Hep B studies    (E78.2) Mixed hyperlipidemia  Comment: Previous , patient controlling with diet. Recheck today  Plan: Lipid panel reflex to direct LDL Fasting            (R53.83) Fatigue, unspecified type  Comment: Patient states overall fatigue  Plan: Comprehensive metabolic panel, CBC with         platelets differential, TSH with free T4 reflex            (K21.9) Gastroesophageal reflux disease without esophagitis  Comment: Continue ppi  Plan: Continue  "prilosec    Osteopenia  DEXA indicates Osteopenia, patient not taking supplements.  Instructed her to take citracal daily, she will     COUNSELING:   Reviewed preventive health counseling, as reflected in patient instructions       Regular exercise       Healthy diet/nutrition       Aspirin Prophylaxsis       Osteoporosis Prevention/Bone Health    The 10-year ASCVD risk score (Gamal AUGUSTIN Jr, et al., 2013) is: 4%    Values used to calculate the score:      Age: 57 years      Sex: Female      Is Non- : Yes      Diabetic: No      Tobacco smoker: No      Systolic Blood Pressure: 121 mmHg      Is BP treated: No      HDL Cholesterol: 54 mg/dL      Total Cholesterol: 197 mg/dL         reports that she has never smoked. She has never used smokeless tobacco.    Estimated body mass index is 26.57 kg/(m^2) as calculated from the following:    Height as of this encounter: 5' 6\" (1.676 m).    Weight as of this encounter: 164 lb 9.6 oz (74.7 kg).       Counseling Resources:  ATP IV Guidelines  Pooled Cohorts Equation Calculator  Breast Cancer Risk Calculator  FRAX Risk Assessment  ICSI Preventive Guidelines  Dietary Guidelines for Americans, 2010  USDA's MyPlate  ASA Prophylaxis  Lung CA Screening    Ankur Mai MD  [facility]  "

## 2018-02-19 NOTE — MR AVS SNAPSHOT
After Visit Summary   2/19/2018    Colette Patricia    MRN: [id number]           Patient Information     Date Of Birth          1960        Visit Information        Provider Department      2/19/2018 8:00 AM Ankur Mai MD; [facility] [facility]        Today's Diagnoses     Gastroesophageal reflux disease without esophagitis    -  1    Mixed hyperlipidemia        Fatigue, unspecified type        Foraminal stenosis of lumbosacral region        Pruritus        Screening for diabetes mellitus        Encounter for screening for diabetes mellitus          Care Instructions    Take the prilosec for your heartburn    Have your labs done at [address]    I have made a referral to the [facility], you can call [phone] to make appointment      Preventive Health Recommendations  Female Ages 50 - 64    Yearly exam: See your health care provider every year in order to  o Review health changes.   o Discuss preventive care.    o Review your medicines if your doctor has prescribed any.      Get a Pap test every three years (unless you have an abnormal result and your provider advises testing more often).    If you get Pap tests with HPV test, you only need to test every 5 years, unless you have an abnormal result.     You do not need a Pap test if your uterus was removed (hysterectomy) and you have not had cancer.    You should be tested each year for STDs (sexually transmitted diseases) if you're at risk.     Have a mammogram every 1 to 2 years.    Have a colonoscopy at age 50, or have a yearly FIT test (stool test). These exams screen for colon cancer.      Have a cholesterol test every 5 years, or more often if advised.    Have a diabetes test (fasting glucose) every three years. If you are at risk for diabetes, you should have this test more often.     If you are at risk for osteoporosis (brittle bone disease), think about having a bone density scan  (DEXA).    Shots: Get a flu shot each year. Get a tetanus shot every 10 years.    Nutrition:     Eat at least 5 servings of fruits and vegetables each day.    Eat whole-grain bread, whole-wheat pasta and brown rice instead of white grains and rice.    Talk to your provider about Calcium and Vitamin D.     Lifestyle    Exercise at least 150 minutes a week (30 minutes a day, 5 days a week). This will help you control your weight and prevent disease.    Limit alcohol to one drink per day.    No smoking.     Wear sunscreen to prevent skin cancer.     See your dentist every six months for an exam and cleaning.    See your eye doctor every 1 to 2 years.            Follow-ups after your visit        Additional Services     ORTHO  REFERRAL       [facility] is referring you to the Orthopedic  Services at [facility].       The  Representative will assist you in the coordination of your Orthopedic and Musculoskeletal Care as prescribed by your physician.    The  Representative will call you within 1 business day to help schedule your appointment, or you may contact the  Representative at:    All areas ~ [phone]     Type of Referral : Spine: Lumbar  **Choose Medical Spine Specialist (unless patient was seen by a Medical Spine Specialist within the past 6 months).**  Surgical Evaluation is advised if the patient presents with one or more of the following red flags: Evidence of Spinal Tumor, Infection or Fracture, Cauda Equina Syndrome, Sudden or Progressive Weakness, Loss of Bowel or Bladder Control, or any other documented emergent neurological condition resulting from a Lumbar Spinal Condition. Spine Surgeon .  Please refer to Dr. Kolton Witt-of [facility].  (patient already saw Neurosurgery but prefers to see at [facility])  Surgical / Specialist       Timeframe requested: Within 2 weeks    Coverage of these services is  "subject to the terms and limitations of your health insurance plan.  Please call member services at your health plan with any benefit or coverage questions.      If X-rays, CT or MRI's have been performed, please contact the facility where they were done to arrange for , prior to your scheduled appointment.  Please bring this referral request to your appointment and present it to your specialist.                  Who to contact     If you have questions or need follow up information about today's clinic visit or your schedule please contact [facility] directly at [phone].  Normal or non-critical lab and imaging results will be communicated to you by [employer], letter or phone within 4 business days after the clinic has received the results. If you do not hear from us within 7 days, please contact the clinic through [employer] or phone. If you have a critical or abnormal lab result, we will notify you by phone as soon as possible.  Submit refill requests through [employer]) or call your pharmacy and they will forward the refill request to us. Please allow 3 business days for your refill to be completed.          Additional Information About Your Visit        [employer] Information     [employer] lets you send messages to your doctor, view your test results, renew your prescriptions, schedule appointments and more. To sign up, go to [email or web address] ([employer]) . Click on \"Log in\" on the left side of the screen, which will take you to the Welcome page. Then click on \"Sign up Now\" on the right side of the page.     You will be asked to enter the access code listed below, as well as some personal information. Please follow the directions to create your username and password.     Your access code is: [email or web address]  Expires: 2018  9:08 AM     Your access code will  in 90 days. If you need help or a new code, please call your [facility] or [phone].        Care EveryWhere ID     This is your " "Care EveryWhere ID. This could be used by other organizations to access your [place] medical records  [id number]        Your Vitals Were     Pulse Temperature Height Last Period Pulse Oximetry BMI (Body Mass Index)    92 98.4  F (36.9  C) (Oral) 5' 6\" (1.676 m) 09/23/2004 100% 26.57 kg/m2       Blood Pressure from Last 3 Encounters:   02/19/18 121/82   08/09/17 117/78   07/26/17 112/70    Weight from Last 3 Encounters:   02/19/18 164 lb 9.6 oz (74.7 kg)   08/09/17 166 lb (75.3 kg)   07/26/17 166 lb (75.3 kg)              We Performed the Following     CBC with platelets differential     Comprehensive metabolic panel     Lipid panel reflex to direct LDL Fasting     ORTHO  REFERRAL     TSH with free T4 reflex        Primary Care Provider Fax #    Physician No Ref-Primary [phone]       No address on file        Equal Access to Services     ELYSSA SELLERS : Haddaquan Calero, wacarley riggs, qashilpata kaalmada juana, amber galloway . So [facility] [phone].    ATENCIÓN: Si habla español, tiene a arroyo disposición servicios gratuitos de asistencia lingüística. Llame al [phone].    We comply with applicable federal civil rights laws and [place] laws. We do not discriminate on the basis of race, color, national origin, age, disability, sex, sexual orientation, or gender identity.            Thank you!     Thank you for choosing [facility]  for your care. Our goal is always to provide you with excellent care. Hearing back from our patients is one way we can continue to improve our services. Please take a few minutes to complete the written survey that you may receive in the mail after your visit with us. Thank you!             Your Updated Medication List - Protect others around you: Learn how to safely use, store and throw away your medicines at www.disposemymeds.org.          This list is accurate as of 2/19/18  9:08 AM.  Always use your most recent " med list.                   Brand Name Dispense Instructions for use Diagnosis    order for DME     1 Units    Lumbar support    Lumbar radicular pain       ranitidine 150 MG tablet    ZANTAC    180 tablet    Take 1 tablet (150 mg) by mouth 2 times daily    Gastroesophageal reflux disease without esophagitis

## 2018-02-19 NOTE — PATIENT INSTRUCTIONS
Take the prilosec for your heartburn    Have your labs done at [address]    I have made a referral to the [facility], you can call [phone] to make appointment      Preventive Health Recommendations  Female Ages 50 - 64    Yearly exam: See your health care provider every year in order to  o Review health changes.   o Discuss preventive care.    o Review your medicines if your doctor has prescribed any.      Get a Pap test every three years (unless you have an abnormal result and your provider advises testing more often).    If you get Pap tests with HPV test, you only need to test every 5 years, unless you have an abnormal result.     You do not need a Pap test if your uterus was removed (hysterectomy) and you have not had cancer.    You should be tested each year for STDs (sexually transmitted diseases) if you're at risk.     Have a mammogram every 1 to 2 years.    Have a colonoscopy at age 50, or have a yearly FIT test (stool test). These exams screen for colon cancer.      Have a cholesterol test every 5 years, or more often if advised.    Have a diabetes test (fasting glucose) every three years. If you are at risk for diabetes, you should have this test more often.     If you are at risk for osteoporosis (brittle bone disease), think about having a bone density scan (DEXA).    Shots: Get a flu shot each year. Get a tetanus shot every 10 years.    Nutrition:     Eat at least 5 servings of fruits and vegetables each day.    Eat whole-grain bread, whole-wheat pasta and brown rice instead of white grains and rice.    Talk to your provider about Calcium and Vitamin D.     Lifestyle    Exercise at least 150 minutes a week (30 minutes a day, 5 days a week). This will help you control your weight and prevent disease.    Limit alcohol to one drink per day.    No smoking.     Wear sunscreen to prevent skin cancer.     See your dentist every six months for an exam and cleaning.    See your eye doctor  every 1 to 2 years.

## 2018-02-19 NOTE — NURSING NOTE
"Chief Complaint   Patient presents with     Physical     Fasting.       Initial /82 (BP Location: Right arm, Patient Position: Sitting, Cuff Size: Adult Regular)  Pulse 92  Temp 98.4  F (36.9  C) (Oral)  Ht 5' 6\" (1.676 m)  Wt 164 lb 9.6 oz (74.7 kg)  LMP 09/23/2004  SpO2 100%  BMI 26.57 kg/m2 Estimated body mass index is 26.57 kg/(m^2) as calculated from the following:    Height as of this encounter: 5' 6\" (1.676 m).    Weight as of this encounter: 164 lb 9.6 oz (74.7 kg).  Medication Reconciliation: complete   Jes Mcpherson MA    Patient is due for a mammogram and she declines.    "

## 2018-02-20 LAB
ALBUMIN SERPL-MCNC: 3.6 G/DL (ref 3.4–5)
ALP SERPL-CCNC: 71 U/L (ref 40–150)
ALT SERPL W P-5'-P-CCNC: 22 U/L (ref 0–50)
ANION GAP SERPL CALCULATED.3IONS-SCNC: 5 MMOL/L (ref 3–14)
AST SERPL W P-5'-P-CCNC: 25 U/L (ref 0–45)
BILIRUB SERPL-MCNC: 0.3 MG/DL (ref 0.2–1.3)
BUN SERPL-MCNC: 12 MG/DL (ref 7–30)
CALCIUM SERPL-MCNC: 9 MG/DL (ref 8.5–10.1)
CHLORIDE SERPL-SCNC: 108 MMOL/L (ref 94–109)
CHOLEST SERPL-MCNC: 211 MG/DL
CO2 SERPL-SCNC: 27 MMOL/L (ref 20–32)
CREAT SERPL-MCNC: 0.62 MG/DL (ref 0.52–1.04)
GFR SERPL CREATININE-BSD FRML MDRD: >90 ML/MIN/1.7M2
GLUCOSE SERPL-MCNC: 80 MG/DL (ref 70–99)
HDLC SERPL-MCNC: 59 MG/DL
LDLC SERPL CALC-MCNC: 137 MG/DL
NONHDLC SERPL-MCNC: 152 MG/DL
POTASSIUM SERPL-SCNC: 4.4 MMOL/L (ref 3.4–5.3)
PROT SERPL-MCNC: 7.5 G/DL (ref 6.8–8.8)
SODIUM SERPL-SCNC: 140 MMOL/L (ref 133–144)
TRIGL SERPL-MCNC: 74 MG/DL
TSH SERPL DL<=0.005 MIU/L-ACNC: 1.54 MU/L (ref 0.4–4)

## 2018-03-06 RX ORDER — ATORVASTATIN CALCIUM 20 MG/1
20 TABLET, FILM COATED ORAL DAILY
Qty: 90 TABLET | Refills: 1 | Status: SHIPPED | OUTPATIENT
Start: 2018-03-06 | End: 2018-06-21

## 2018-03-07 ENCOUNTER — OFFICE VISIT (OUTPATIENT)
Dept: OPTOMETRY | Facility: CLINIC | Age: 58
End: 2018-03-07
Payer: MEDICARE

## 2018-03-07 DIAGNOSIS — H26.9 BILATERAL INCIPIENT CATARACTS: ICD-10-CM

## 2018-03-07 DIAGNOSIS — H52.203 HYPEROPIA OF BOTH EYES WITH ASTIGMATISM: Primary | ICD-10-CM

## 2018-03-07 DIAGNOSIS — H52.4 PRESBYOPIA: ICD-10-CM

## 2018-03-07 DIAGNOSIS — H52.03 HYPEROPIA OF BOTH EYES WITH ASTIGMATISM: Primary | ICD-10-CM

## 2018-03-07 DIAGNOSIS — H04.129 DRY EYE: ICD-10-CM

## 2018-03-07 PROCEDURE — 92015 DETERMINE REFRACTIVE STATE: CPT | Mod: GY | Performed by: OPTOMETRIST

## 2018-03-07 PROCEDURE — 92004 COMPRE OPH EXAM NEW PT 1/>: CPT | Performed by: OPTOMETRIST

## 2018-03-07 RX ORDER — POLYVINYL ALCOHOL 14 MG/ML
1 SOLUTION/ DROPS OPHTHALMIC 2 TIMES DAILY PRN
Qty: 6 ML | Refills: 12 | Status: SHIPPED | OUTPATIENT
Start: 2018-03-07 | End: 2019-01-15

## 2018-03-07 ASSESSMENT — SLIT LAMP EXAM - LIDS
COMMENTS: NORMAL
COMMENTS: NORMAL

## 2018-03-07 ASSESSMENT — REFRACTION_MANIFEST
OD_AXIS: 156
OS_CYLINDER: +1.00
OS_AXIS: 090
OD_CYLINDER: +0.25
OD_SPHERE: +0.75
OD_AXIS: 150
OS_ADD: +2.50
OS_SPHERE: +0.50
OS_SPHERE: PLANO
OD_ADD: +2.50
OD_CYLINDER: +0.50
OS_AXIS: 92
OS_CYLINDER: +0.25
METHOD_AUTOREFRACTION: 1
OD_SPHERE: +0.25

## 2018-03-07 ASSESSMENT — TONOMETRY
OS_IOP_MMHG: 16
IOP_METHOD: APPLANATION
OD_IOP_MMHG: 16

## 2018-03-07 ASSESSMENT — REFRACTION_WEARINGRX
OD_CYLINDER: +0.50
OS_ADD: +2.50
SPECS_TYPE: BIFOCAL
OS_AXIS: 090
OD_AXIS: 111
OD_ADD: +2.50
OD_SPHERE: +0.25
OS_SPHERE: +0.25
OS_CYLINDER: +0.50

## 2018-03-07 ASSESSMENT — EXTERNAL EXAM - RIGHT EYE: OD_EXAM: NORMAL

## 2018-03-07 ASSESSMENT — VISUAL ACUITY
OS_CC: 20/20
OD_SC: 20/30
CORRECTION_TYPE: GLASSES
OD_CC: 20/25
OD_SC+: -2
OS_CC: 20/20
OS_SC: 20/20
OD_CC: 20/20
METHOD: SNELLEN - LINEAR
OD_CC+: -1

## 2018-03-07 ASSESSMENT — CONF VISUAL FIELD
OS_NORMAL: 1
OD_NORMAL: 1

## 2018-03-07 ASSESSMENT — EXTERNAL EXAM - LEFT EYE: OS_EXAM: NORMAL

## 2018-03-07 ASSESSMENT — CUP TO DISC RATIO
OD_RATIO: 0.4
OS_RATIO: 0.4

## 2018-03-07 NOTE — LETTER
3/7/2018         RE: Colette Patricia  [address]  [place] [address]        Dear Colleague,    [facility] you [facility]. Please see a copy of my visit note below.    Chief Complaint   Patient presents with     COMPREHENSIVE EYE EXAM    eyes feel dry, glasses from [facility], wondering if ok    Accompanied by   She speaks english well   Last Eye Exam: 2yrs  Dilated Previously: Yes    What are you currently using to see?  readers       Distance Vision Acuity: Satisfied with vision    Near Vision Acuity: Satisfied with vision while reading  with readers    Eye Comfort: good  Do you use eye drops? : No  Occupation or Hobbies: General      HPI    Symptoms:     Blurred vision                      Medical, surgical and family histories reviewed and updated 3/7/2018.       OBJECTIVE: See Ophthalmology exam    ASSESSMENT:  No diagnosis found.   PLAN:       Tiffany Finch OD     Again, thank you for allowing me to participate in the care of your patient.        Sincerely,        Tiffany Finch, OD

## 2018-03-07 NOTE — MR AVS SNAPSHOT
After Visit Summary   3/7/2018    Colette Patricia    MRN: [id number]           Patient Information     Date Of Birth          1960        Visit Information        Provider Department      3/7/2018 1:25 PM Tiffany Finch, OD; [facility]        Today's Diagnoses     Hyperopia of both eyes with astigmatism    -  1    Presbyopia        Dry eye        Bilateral incipient cataracts          Care Instructions    Optional prescription change  I recommend using artificial tears for your dry eye. There are over the counter drops that work well and may be used up to 4 x daily. ( systane , refresh, thera tears) If you need more than 4 drops daily, use a preservative free product which come in individual vials and may be used for 24 hours until finished and discarded.     Early start of cataracts , vision corrects to 20/20          Follow-ups after your visit        Follow-up notes from your care team     Return in about 1 year (around 3/7/2019) for Annual Visit.      Your next 10 appointments already scheduled     Mar 12, 2018 11:00 AM CDT   New Visit with Ronald Gandhi DPM   [facility] ([facility])    [address] [place] [address] [address]  [facility] [id number] [phone]              Who to contact     If you have questions or need follow up information about today's clinic visit or your schedule please contact [facility] directly at [phone].  Normal or non-critical lab and imaging results will be communicated to you by MyChart, letter or phone within 4 business days after the clinic has received the results. If you do not hear from us within 7 days, please contact the clinic through MyChart or phone. If you have a critical or abnormal lab result, we will notify you by phone as soon as possible.  Submit refill requests through [employer] or call your pharmacy and they will forward the refill request to us. Please allow 3  "business days for your refill to be completed.          Additional Information About Your Visit        MyChart Information     [employer] lets you send messages to your doctor, view your test results, renew your prescriptions, schedule appointments and more. To sign up, go to [email or web address] . Click on \"Log in\" on the left side of the screen, which will take you to the Welcome page. Then click on \"Sign up Now\" on the right side of the page.     You will be asked to enter the access code listed below, as well as some personal information. Please follow the directions to create your username and password.     Your access code is: [email or web address]  Expires: 2018  9:08 AM     Your access code will  in 90 days. If you need help or a new code, please call your [facility] clinic or [phone].        Care EveryWhere ID     This is your Care EveryWhere ID. This could be used by other organizations to access your [facility] medical records  [id number]        Your Vitals Were     Last Period                   2004            Blood Pressure from Last 3 Encounters:   18 121/82   17 117/78   17 112/70    Weight from Last 3 Encounters:   18 74.7 kg (164 lb 9.6 oz)   17 75.3 kg (166 lb)   17 75.3 kg (166 lb)              Today, you had the following     No orders found for display       Primary Care Provider Fax #    Physician No Ref-Primary [phone]       No address on file        Equal Access to Services     FRANKI SELLERS : Hadii jaci avelaro Sodick, waaxda luqadaha, qaybta kaalmada adeegyada, amber galloway . So [facility] [phone].    ATENCIÓN: Si habla español, tiene a arroyo disposición servicios gratuitos de asistencia lingüística. Llame al [phone].    We comply with applicable federal civil rights laws and [place] laws. We do not discriminate on the basis of race, color, national origin, age, disability, sex, sexual orientation, or " gender identity.            Thank you!     Thank you for choosing [facility]  for your care. Our goal is always to provide you with excellent care. Hearing back from our patients is one way we can continue to improve our services. Please take a few minutes to complete the written survey that you may receive in the mail after your visit with us. Thank you!             Your Updated Medication List - Protect others around you: Learn how to safely use, store and throw away your medicines at www.disposemymeds.org.          This list is accurate as of 3/7/18  2:05 PM.  Always use your most recent med list.                   Brand Name Dispense Instructions for use Diagnosis    atorvastatin 20 MG tablet    LIPITOR    90 tablet    Take 1 tablet (20 mg) by mouth daily    Mixed hyperlipidemia       order for DME     1 Units    Lumbar support    Lumbar radicular pain       ranitidine 150 MG tablet    ZANTAC    180 tablet    Take 1 tablet (150 mg) by mouth 2 times daily    Gastroesophageal reflux disease without esophagitis

## 2018-03-07 NOTE — PROGRESS NOTES
"No chief complaint on file.        Last Eye Exam: ***  Dilated Previously: {YES / NO:193117::\"Yes\"}    What are you currently using to see?  {options:621294}       Distance Vision Acuity: {VISION:809767}    Near Vision Acuity: {VISION:821939}    Eye Comfort: {EYE COMFORT:931874}  Do you use eye drops? : {YES (EXPLAIN)/NO:692294}  Occupation or Hobbies: ***    [unfilled]          Medical, surgical and family histories reviewed and updated 3/7/2018.       OBJECTIVE: See Ophthalmology exam    ASSESSMENT:  No diagnosis found.   PLAN:       Tiffany Finch OD     "

## 2018-03-07 NOTE — PATIENT INSTRUCTIONS
Optional prescription change  I recommend using artificial tears for your dry eye. There are over the counter drops that work well and may be used up to 4 x daily. ( systane , refresh, thera tears) If you need more than 4 drops daily, use a preservative free product which come in individual vials and may be used for 24 hours until finished and discarded.     Early start of cataracts , vision corrects to 20/20

## 2018-03-07 NOTE — PROGRESS NOTES
Chief Complaint   Patient presents with     COMPREHENSIVE EYE EXAM    eyes feel dry, glasses from [facility], wondering if ok    Accompanied by   She speaks english well   Last Eye Exam: 2yrs  Dilated Previously: Yes    What are you currently using to see?  readers       Distance Vision Acuity: Satisfied with vision    Near Vision Acuity: Satisfied with vision while reading  with readers    Eye Comfort: good  Do you use eye drops? : No  Occupation or Hobbies: General      HPI    Symptoms:     Blurred vision                      Medical, surgical and family histories reviewed and updated 3/7/2018.       OBJECTIVE: See Ophthalmology exam    ASSESSMENT:  No diagnosis found.   PLAN:       Tiffany Finch OD

## 2018-03-12 ENCOUNTER — OFFICE VISIT (OUTPATIENT)
Dept: PODIATRY | Facility: CLINIC | Age: 58
End: 2018-03-12
Payer: MEDICARE

## 2018-03-12 VITALS — HEIGHT: 66 IN | HEART RATE: 80 BPM | WEIGHT: 164 LBS | BODY MASS INDEX: 26.36 KG/M2

## 2018-03-12 DIAGNOSIS — M21.611 BUNION, RIGHT: Primary | ICD-10-CM

## 2018-03-12 PROCEDURE — 99203 OFFICE O/P NEW LOW 30 MIN: CPT | Performed by: PODIATRIST

## 2018-03-12 ASSESSMENT — PAIN SCALES - GENERAL: PAINLEVEL: MODERATE PAIN (5)

## 2018-03-12 NOTE — PROGRESS NOTES
"Foot & Ankle Surgery  March 12, 2018    CC: \"foot big toe\"    I was asked to see Colette Barbermichael Patricia regarding the chief complaint by:  self    HPI:  Pt is a 58 year old female who presents with above complaint.  Seen with , pain right bunion x \"yaers\".  Describes throbbing pain, 5/10 daily.  Worse with walking.  No treatment up to this point, \"how do I treat if I don't know what it is?\"      ROS:   Pos for CC.  The patient denies current nausea, vomiting, chills, fevers, belly pain, calf pain, chest pain or SOB.  Complete remainder of ROS is otherwise neg.    VITALS:    Vitals:    03/12/18 1100   Pulse: 80   Weight: 164 lb (74.4 kg)   Height: 5' 6\" (1.676 m)       PMH:    Past Medical History:   Diagnosis Date     Other unspecified back disorder        SXHX:  No past surgical history on file.     MEDS:    Current Outpatient Prescriptions   Medication     polyvinyl alcohol (ARTIFICIAL TEARS) 1.4 % ophthalmic solution     atorvastatin (LIPITOR) 20 MG tablet     order for DME     ranitidine (ZANTAC) 150 MG tablet     No current facility-administered medications for this visit.        ALL:     Allergies   Allergen Reactions     No Known Drug Allergies        FMH:    Family History   Problem Relation Age of Onset     Family History Negative Mother      Family History Negative Father      Glaucoma No family hx of      Macular Degeneration No family hx of        SocHx:    Social History     Social History     Marital status:      Spouse name: N/A     Number of children: N/A     Years of education: N/A     Occupational History     Not on file.     Social History Main Topics     Smoking status: Never Smoker     Smokeless tobacco: Never Used     Alcohol use No     Drug use: No     Sexual activity: No     Other Topics Concern     Not on file     Social History Narrative           EXAMINATION:  Gen:   No apparent distress  Neuro:   A&Ox3, no deficits  Psych:    Answering questions appropriately for age and " situation with normal affect  Head:    NCAT  Eye:    Visual scanning without deficit  Ear:    Response to auditory stimuli wnl  Lung:    Non-labored breathing on RA noted  Abd:    NTND per patient report  Lymph:    Neg for pitting/non-pitting edema BLE  Vasc:    Pulses palpable, CFT minimally delayed  Neuro:    Light touch sensation intact to all sensory nerve distributions without paresthesias  Derm:    Neg for nodules, lesions or ulcerations  MSK:    Bunion right foot, partially reducible.  ROM approximately 70 degrees in corrected position, with slight *click*, but no crepitus and minimal pain.  Joint is slightly trackbound.  No sub 2nd MPJ pain noted  Calf:    Neg for redness, swelling or tenderness    Assessment:  58 year old female with painful bunion right foot      Plan:  Discussed etiologies, anatomy and options  1.  Bunion right foot  -conservatively, discussed accommodative comfortable shoe gear and padding; padding handout dispensed, discussed available options  -I went to the pharmacy in clinic and they do have bunion shield pads available   -discussed surgical options; patient not interested in surgery at this point.    Follow up:  prn or sooner with acute issues      Patient's medical history was reviewed today    Body mass index is 26.47 kg/(m^2).  Weight management plan: Patient was referred to their PCP to discuss a diet and exercise plan.        Ronald Gandhi DPM   Podiatric Foot & Ankle Surgeon  [facility]  [phone]

## 2018-03-12 NOTE — MR AVS SNAPSHOT
After Visit Summary   3/12/2018    Colette Patricia    MRN: [id number]           Patient Information     Date Of Birth          1960        Visit Information        Provider Department      3/12/2018 10:45 AM Ronald Grider DPM; [employer] [facility]        Care Instructions    Thank you for choosing [facility]!    DR. GRIDER'S CLINIC LOCATIONS:   MONDAY - [place] TUESDAY - [place]   [address] [facility]  [address]   [place] [address] [place] [address]   [phone]       THURSDAY AM - [place] THURSDAY PM - [place]   [address]   [place] [address] [place], [place] [address] [phone]       FRIDAY AM - [place] SET UP SURGERY: [phone] [address] APPOINTMENTS: [phone]   [place] [address] BILLING QUESTIONS: [phone] FAX NUMBER: [phone]     Follow Up: as needed    Body Mass Index (BMI)  Many things can cause foot and ankle problems. Foot structure, activity level, foot mechanics and injuries are common causes of pain. One very important issue that often goes unmentioned, is body weight. Extra weight can cause increased stress on muscles, ligaments, bones and tendons. Sometimes just a few extra pounds is all it takes to put one over her/his threshold. Without reducing that stress, it can be difficult to alleviate pain. Some people are uncomfortable addressing this issue, but we feel it is important for you to think about it. As Foot &  Ankle specialists, our job is addressing the lower extremity problem and possible causes. Regarding extra body weight, we encourage patients to discuss diet and weight management plans with their primary care doctors. It is this team approach that gives you the best opportunity for pain relief and getting you back on your feet.    BUNION (HALLUX ABDUCTO VALGUS)  A bunion is  caused by muscle imbalance. The great toe is pulled toward the smaller toes. The metatarsal head is pushed outward creating a lump on the side of your foot. Imbalance is the result of foot structure and instability.   Bunions do not improve with time. They usually enlarge, however this is a fairly slow process. Shoes do not necessarily cause bunions, however, they can hasten development and definitely cause bunions to hurt.   Bunions often run in families. We inherit a certain foot structure, which may be predisposed to bunion development.   Bunion pain is likely a combination of shoes rubbing on the bump, nerve irritation, compression between the toes, joint misalignment, arthritis and altered gait.   SYMPTOMS   Bunions are usually termed mild, moderate or severe. Just because you have a bunion does not mean you have to have pain. There are some people with very severe bunions and no pain and people with mild bunions and a lot of pain.   - Pain on the inside of your foot at the big toe joint (1st MTPJ)   - Swelling on the inside of your foot at the big toe joint   - Redness on the inside of your foot at the big toe joint   - Numbness or burning in the big toe (hallux)   - Decreased motion at the big toe joint   - Painful bursa (fluid-filled sac) on the inside of your foot at the big toe joint   - Pain while wearing shoes -especially shoes too narrow or with high heels    - Pain during activities   - Corn in between the big toe and second toe   - Callous formation on the side or bottom of the big toe or big toe joint   - Callous under the second toe joint (2nd MTPJ)   - Pain in the second toe joint   TREATMENT  Conservative (non-surgical) treatment will not make the bunion go away, but it will hopefully decrease the signs and symptoms you have and help you get rid of the pain and get you back to your activities.   1.  Wider shoes or extra depth shoes: Most bunion pain can be improved simply by wearing compatible  shoes. People with bunions cannot choose footwear simply because they like the style. Your bunion should determine which shoes are to be worn. Wide shoes with nonirritating seams,soft leather and a square toe box are most compatible with a bunion. Shoes should fit appropriately right out of the box but may need to be professionally stretched and modified to accommodate the bump. Heels, dress shoes and shoes with pointed toes will not be comfortable.   2. NSAIDs   3.  Arch supports, custom inserts, padding, splints, toe spacers : Most bunion pain can be improved simply by wearing compatible shoes. People with bunions cannot choose footwear simply because they like the style. Your bunion should determine which shoes are to be worn. Wide shoes with nonirritating seams,soft leather and a square toe box are most compatible with a bunion. Shoes should fit appropriately right out of the box but may need to be professionally stretched and modified to accommodate the bump. Heels, dress shoes and shoes with pointed toes will not be comfortable.   4.  Change activities   5.  Physical therapy  SURGERY  Surgical treatment for bunions is sometimes needed. If you are limited by pain, cannot fit in shoes comfortably and are not able to do your daily activities then surgery may be a good option for you. There are many different surgical procedures to repair bunions. Your foot and ankle surgeon will review your foot exam findings, your x-rays, your age, your health, your lifestyle, your physical activity level and discuss with you which procedure he or she would recommend. Surgical procedures for bunions range from soft tissue repair to cutting and realigning the bones. It is not recommended that you have bunion surgery for cosmetic reasons (you do not like how your foot looks) or because you want to fit in a certain pair of shoes; There is the risk that even after surgery, the bunion will reoccur 9-10% of the time.   Bunion surgery  involves cutting and repositioning the bones surrounding the bunion. Pins and screws are used to hold the bones in place during the healing process. The goal of bunion surgery is to reduce the size of the bunion bump. Realignment of the toe and joint is attempted.     Some first toes cannot be forced back into normal alignment even with surgery. Surgery is helpful in most cases but does not necessarily create a normal foot.   Healing after surgery requires about six weeks of protection. This allows the bone to heal. Maximum recovery takes about one year. The scar tissue and jOint structures require this amount of time to finish the healing process. Expect stiffness, swelling and numbness during that time frame. Bunion surgery does involve side effects. Some side effects are predictable and others are less common but do occur. A scar will be visible and could be irritated by shoes. The shoe may rub on the screw or internal pin requiring surgical removal of these fixation devices. The screw and pin would likely be left in place for a full year. The first toe may loose motion after bunion surgery. The amount of stiffness is variable. Some people never regain normal motion of the first toe. This is due to scar tissue inherent to any surgery. The first toe may drift toward the second toe or away from the second toe. Spreading of the first and second toes is a rare occurrence after bunion surgery. This can be quite bothersome and would need to be surgically repaired. Toe drift toward the second toe could result in a recurrent bunion and revision surgery. Joint fusion is one option to correct an unstable, drifting toe. This procedure straightens the toe, however, no motion remains. Fusion may be necessary to correct complications of bunion surgery or as the original procedure in severe cases.   All surgical procedures involve risk of infection, numbness, pain, delayed healing, osteotomy dislocation, blood clots, continued  foot pain, etc. Bunion surgery is quite complex and should not be taken lightly.   Any skin incision can lead to infection. Deep infection might involve the bone and thus repeat surgery and six weeks of IV antibiotics. Scar tissue can cause nerve pain or numbness. This is generally temporary but can be permanent. We do not have treatments that cure nerve problems. Second toe pain could be related to altered mechanics and pressure transferred to the second toe. Most feet with bunions have pre-existing second toe problems. Delayed bone healing would lengthen the healing time. Some bones simply do not heal. This requires repeat surgery, electronic bone stimulation and/or extended protection. Smokers have an approximate 20% chance of poor bone healing. This is double that of a non-smoker. The bone cut may displace. This may need to be repaired with a second operation. Displacement can cause jOint malalignment. Immobility after surgery can cause blood clots in the legs and lungs. This could result in death.   Foot pain is complex. Most feet hurt for more than one reason. Fixing the bunion would not necessarily create a pain free foot. Appropriate shoes, healthy body weight, avoidance of bare foot walking and moderation of activity will always be necessary to enjoy foot comfort. Your bunion may involve arthritis, which is incurable even with surgery. Long standing bunions often involve chronic irritation to the surrounding nerves. Nerve pain may not resolve even with reducing the bunion bump since permanent nerve damage may be present   Bunion surgery is nevertheless quite successful. Most surgical patients are pleased with their foot following bunion surgery. Many of the issues described above can be controlled by taking proper care of your foot during the healing process.   Your surgeon would be happy to fully describe any of the above issues. You should pursue a full understanding of the operation,recovery process and  any potential problems that could develop.   PREVENTION  1.  Do not wear high heels if there is a family history of bunions.  2.  Wear shoes that have enough width and depth in the toe box  Here are exercises that may benefit people with bunions:   Toe stretches - Stretching out your toes can help keep them limber and offset foot pain. To stretch your toes, point your toes straight ahead for 5 seconds and then curl them under for 5 seconds. Repeat these stretches 10 times. These exercises can be especially beneficial if you also have hammertoes, or chronically bent toes, in addition to a bunion.   Toe flexing and jacqueline - Press your toes against a hard surface such as a wall, to flex and stretch them; hold the position for 10 seconds and repeat three to four times. Then flex your toes in the opposite direction; hold the position for 10 seconds and repeat three to four times.   Stretching your big toe - Using your fingers to gently pull your big toe over into proper alignment can be helpful as well. Hold your toe in position for 10 seconds and repeat three to four times.   Resistance exercises - Wrap either a towel or belt around your big toe and use it to pull your big toe toward you while simultaneously pushing forward, against the towel, with your big toe.   Ball roll - To massage the bottom of your foot, sit down, place a golf ball on the floor under your foot, and roll it around under your foot for two minutes. This can help relieve foot strain and cramping.   Towel curls - You can strengthen your toes by spreading out a small towel on the floor, curling your toes around it, and pulling it toward you. Repeat five times. Gripping objects with your toes like this can help keep your foot flexible.   Picking up marbles - Another gripping exercise you can perform to keep your foot flexible is picking up marbles with your toes. Do this by placing 20 marbles on the floor in front of you and use your foot to pick  "the marbles up one by one and place them in a bowl.   Walking along the beach - Whenever possible, spend time walking on sand. This can give you a gentle foot massage and also help strengthen your toes. This is especially beneficial for people who have arthritis associated with their bunions.        [email or web address]   -[email or web address]-PEDIFIX             Follow-ups after your visit        Who to contact     If you have questions or need follow up information about today's clinic visit or your schedule please contact [facility] directly at [phone].  Normal or non-critical lab and imaging results will be communicated to you by Jeeveshart, letter or phone within 4 business days after the clinic has received the results. If you do not hear from us within 7 days, please contact the clinic through [facility] or phone. If you have a critical or abnormal lab result, we will notify you by phone as soon as possible.  Submit refill requests through [employer] or call your pharmacy and they will forward the refill request to us. Please allow 3 business days for your refill to be completed.          Additional Information About Your Visit        JeevesharYummy77 Information     GridX lets you send messages to your doctor, view your test results, renew your prescriptions, schedule appointments and more. To sign up, go to [email or web address] . Click on \"Log in\" on the left side of the screen, which will take you to the Welcome page. Then click on \"Sign up Now\" on the right side of the page.     You will be asked to enter the access code listed below, as well as some personal information. Please follow the directions to create your username and password.     Your access code is: [email or web address]  Expires: 2018 10:08 AM     Your access code will  in 90 days. If you need help or a new code, please call your [facility] or [phone].        Care EveryWhere ID     This is your Care EveryWhere ID. This could be used by other " "organizations to access your [facility] medical records  [id number]        Your Vitals Were     Pulse Height Last Period BMI (Body Mass Index)          80 5' 6\" (1.676 m) 09/23/2004 26.47 kg/m2         Blood Pressure from Last 3 Encounters:   02/19/18 121/82   08/09/17 117/78   07/26/17 112/70    Weight from Last 3 Encounters:   03/12/18 164 lb (74.4 kg)   02/19/18 164 lb 9.6 oz (74.7 kg)   08/09/17 166 lb (75.3 kg)              Today, you had the following     No orders found for display       Primary Care Provider Fax #    Physician No Ref-Primary [phone]       No address on file        Equal Access to Services     ELYSSA SELLERS : Dagmar Calero, wacarley luqadaha, qaybta kaalmada crisyamiah, amber galloway . So [facility] [phone].    ATENCIÓN: Si habla español, tiene a arroyo disposición servicios gratuitos de asistencia lingüística. Llame al [phone].    We comply with applicable federal civil rights laws and [place] laws. We do not discriminate on the basis of race, color, national origin, age, disability, sex, sexual orientation, or gender identity.            Thank you!     Thank you for choosing [facility]  for your care. Our goal is always to provide you with excellent care. Hearing back from our patients is one way we can continue to improve our services. Please take a few minutes to complete the written survey that you may receive in the mail after your visit with us. Thank you!             Your Updated Medication List - Protect others around you: Learn how to safely use, store and throw away your medicines at www.disposemymeds.org.          This list is accurate as of 3/12/18 11:21 AM.  Always use your most recent med list.                   Brand Name Dispense Instructions for use Diagnosis    atorvastatin 20 MG tablet    LIPITOR    90 tablet    Take 1 tablet (20 mg) by mouth daily    Mixed hyperlipidemia       order for DME     1 Units    Lumbar " support    Lumbar radicular pain       polyvinyl alcohol 1.4 % ophthalmic solution    ARTIFICIAL TEARS    6 mL    Place 1 drop into both eyes 2 times daily as needed for dry eyes    Dry eye       ranitidine 150 MG tablet    ZANTAC    180 tablet    Take 1 tablet (150 mg) by mouth 2 times daily    Gastroesophageal reflux disease without esophagitis

## 2018-03-12 NOTE — NURSING NOTE
"Chief Complaint   Patient presents with     Establish Care     Toe Pain     right hallux / sevral yrs / painful when walking       Initial Pulse 80  Ht 5' 6\" (1.676 m)  Wt 164 lb (74.4 kg)  LMP 09/23/2004  BMI 26.47 kg/m2 Estimated body mass index is 26.47 kg/(m^2) as calculated from the following:    Height as of this encounter: 5' 6\" (1.676 m).    Weight as of this encounter: 164 lb (74.4 kg).  Medication Reconciliation: complete    "

## 2018-03-12 NOTE — PATIENT INSTRUCTIONS
Thank you for choosing [facility]!    DR. GRIDER'S CLINIC LOCATIONS:   MONDAY - [place] TUESDAY - [place]   [address] [facility]  [address]   [place] [address] [place] [address]   [phone]       THURSDAY AM - [place] THURSDAY PM - [facility]   [address]   [place] [address] [place], [place] [address] [phone]       FRIDAY AM - [place] SET UP SURGERY: [phone] [address] APPOINTMENTS: [phone]   [place] [address] BILLING QUESTIONS: [phone] FAX NUMBER: [phone]     Follow Up: as needed    Body Mass Index (BMI)  Many things can cause foot and ankle problems. Foot structure, activity level, foot mechanics and injuries are common causes of pain. One very important issue that often goes unmentioned, is body weight. Extra weight can cause increased stress on muscles, ligaments, bones and tendons. Sometimes just a few extra pounds is all it takes to put one over her/his threshold. Without reducing that stress, it can be difficult to alleviate pain. Some people are uncomfortable addressing this issue, but we feel it is important for you to think about it. As Foot &  Ankle specialists, our job is addressing the lower extremity problem and possible causes. Regarding extra body weight, we encourage patients to discuss diet and weight management plans with their primary care doctors. It is this team approach that gives you the best opportunity for pain relief and getting you back on your feet.    BUNION (HALLUX ABDUCTO VALGUS)  A bunion is caused by muscle imbalance. The great toe is pulled toward the smaller toes. The metatarsal head is pushed outward creating a lump on the side of your foot. Imbalance is the result of foot structure and instability.   Bunions do not improve with time. They usually enlarge, however this is a fairly slow process. Shoes do not  necessarily cause bunions, however, they can hasten development and definitely cause bunions to hurt.   Bunions often run in families. We inherit a certain foot structure, which may be predisposed to bunion development.   Bunion pain is likely a combination of shoes rubbing on the bump, nerve irritation, compression between the toes, joint misalignment, arthritis and altered gait.   SYMPTOMS   Bunions are usually termed mild, moderate or severe. Just because you have a bunion does not mean you have to have pain. There are some people with very severe bunions and no pain and people with mild bunions and a lot of pain.   - Pain on the inside of your foot at the big toe joint (1st MTPJ)   - Swelling on the inside of your foot at the big toe joint   - Redness on the inside of your foot at the big toe joint   - Numbness or burning in the big toe (hallux)   - Decreased motion at the big toe joint   - Painful bursa (fluid-filled sac) on the inside of your foot at the big toe joint   - Pain while wearing shoes -especially shoes too narrow or with high heels    - Pain during activities   - Corn in between the big toe and second toe   - Callous formation on the side or bottom of the big toe or big toe joint   - Callous under the second toe joint (2nd MTPJ)   - Pain in the second toe joint   TREATMENT  Conservative (non-surgical) treatment will not make the bunion go away, but it will hopefully decrease the signs and symptoms you have and help you get rid of the pain and get you back to your activities.   1.  Wider shoes or extra depth shoes: Most bunion pain can be improved simply by wearing compatible shoes. People with bunions cannot choose footwear simply because they like the style. Your bunion should determine which shoes are to be worn. Wide shoes with nonirritating seams,soft leather and a square toe box are most compatible with a bunion. Shoes should fit appropriately right out of the box but may need to be  professionally stretched and modified to accommodate the bump. Heels, dress shoes and shoes with pointed toes will not be comfortable.   2. NSAIDs   3.  Arch supports, custom inserts, padding, splints, toe spacers : Most bunion pain can be improved simply by wearing compatible shoes. People with bunions cannot choose footwear simply because they like the style. Your bunion should determine which shoes are to be worn. Wide shoes with nonirritating seams,soft leather and a square toe box are most compatible with a bunion. Shoes should fit appropriately right out of the box but may need to be professionally stretched and modified to accommodate the bump. Heels, dress shoes and shoes with pointed toes will not be comfortable.   4.  Change activities   5.  Physical therapy  SURGERY  Surgical treatment for bunions is sometimes needed. If you are limited by pain, cannot fit in shoes comfortably and are not able to do your daily activities then surgery may be a good option for you. There are many different surgical procedures to repair bunions. Your foot and ankle surgeon will review your foot exam findings, your x-rays, your age, your health, your lifestyle, your physical activity level and discuss with you which procedure he or she would recommend. Surgical procedures for bunions range from soft tissue repair to cutting and realigning the bones. It is not recommended that you have bunion surgery for cosmetic reasons (you do not like how your foot looks) or because you want to fit in a certain pair of shoes; There is the risk that even after surgery, the bunion will reoccur 9-10% of the time.   Bunion surgery involves cutting and repositioning the bones surrounding the bunion. Pins and screws are used to hold the bones in place during the healing process. The goal of bunion surgery is to reduce the size of the bunion bump. Realignment of the toe and joint is attempted.     Some first toes cannot be forced back into normal  alignment even with surgery. Surgery is helpful in most cases but does not necessarily create a normal foot.   Healing after surgery requires about six weeks of protection. This allows the bone to heal. Maximum recovery takes about one year. The scar tissue and jOint structures require this amount of time to finish the healing process. Expect stiffness, swelling and numbness during that time frame. Bunion surgery does involve side effects. Some side effects are predictable and others are less common but do occur. A scar will be visible and could be irritated by shoes. The shoe may rub on the screw or internal pin requiring surgical removal of these fixation devices. The screw and pin would likely be left in place for a full year. The first toe may loose motion after bunion surgery. The amount of stiffness is variable. Some people never regain normal motion of the first toe. This is due to scar tissue inherent to any surgery. The first toe may drift toward the second toe or away from the second toe. Spreading of the first and second toes is a rare occurrence after bunion surgery. This can be quite bothersome and would need to be surgically repaired. Toe drift toward the second toe could result in a recurrent bunion and revision surgery. Joint fusion is one option to correct an unstable, drifting toe. This procedure straightens the toe, however, no motion remains. Fusion may be necessary to correct complications of bunion surgery or as the original procedure in severe cases.   All surgical procedures involve risk of infection, numbness, pain, delayed healing, osteotomy dislocation, blood clots, continued foot pain, etc. Bunion surgery is quite complex and should not be taken lightly.   Any skin incision can lead to infection. Deep infection might involve the bone and thus repeat surgery and six weeks of IV antibiotics. Scar tissue can cause nerve pain or numbness. This is generally temporary but can be permanent. We  do not have treatments that cure nerve problems. Second toe pain could be related to altered mechanics and pressure transferred to the second toe. Most feet with bunions have pre-existing second toe problems. Delayed bone healing would lengthen the healing time. Some bones simply do not heal. This requires repeat surgery, electronic bone stimulation and/or extended protection. Smokers have an approximate 20% chance of poor bone healing. This is double that of a non-smoker. The bone cut may displace. This may need to be repaired with a second operation. Displacement can cause jOint malalignment. Immobility after surgery can cause blood clots in the legs and lungs. This could result in death.   Foot pain is complex. Most feet hurt for more than one reason. Fixing the bunion would not necessarily create a pain free foot. Appropriate shoes, healthy body weight, avoidance of bare foot walking and moderation of activity will always be necessary to enjoy foot comfort. Your bunion may involve arthritis, which is incurable even with surgery. Long standing bunions often involve chronic irritation to the surrounding nerves. Nerve pain may not resolve even with reducing the bunion bump since permanent nerve damage may be present   Bunion surgery is nevertheless quite successful. Most surgical patients are pleased with their foot following bunion surgery. Many of the issues described above can be controlled by taking proper care of your foot during the healing process.   Your surgeon would be happy to fully describe any of the above issues. You should pursue a full understanding of the operation,recovery process and any potential problems that could develop.   PREVENTION  1.  Do not wear high heels if there is a family history of bunions.  2.  Wear shoes that have enough width and depth in the toe box  Here are exercises that may benefit people with bunions:   Toe stretches - Stretching out your toes can help keep them limber  and offset foot pain. To stretch your toes, point your toes straight ahead for 5 seconds and then curl them under for 5 seconds. Repeat these stretches 10 times. These exercises can be especially beneficial if you also have hammertoes, or chronically bent toes, in addition to a bunion.   Toe flexing and jacqueline - Press your toes against a hard surface such as a wall, to flex and stretch them; hold the position for 10 seconds and repeat three to four times. Then flex your toes in the opposite direction; hold the position for 10 seconds and repeat three to four times.   Stretching your big toe - Using your fingers to gently pull your big toe over into proper alignment can be helpful as well. Hold your toe in position for 10 seconds and repeat three to four times.   Resistance exercises - Wrap either a towel or belt around your big toe and use it to pull your big toe toward you while simultaneously pushing forward, against the towel, with your big toe.   Ball roll - To massage the bottom of your foot, sit down, place a golf ball on the floor under your foot, and roll it around under your foot for two minutes. This can help relieve foot strain and cramping.   Towel curls - You can strengthen your toes by spreading out a small towel on the floor, curling your toes around it, and pulling it toward you. Repeat five times. Gripping objects with your toes like this can help keep your foot flexible.   Picking up marbles - Another gripping exercise you can perform to keep your foot flexible is picking up marbles with your toes. Do this by placing 20 marbles on the floor in front of you and use your foot to pick the marbles up one by one and place them in a bowl.   Walking along the beach - Whenever possible, spend time walking on sand. This can give you a gentle foot massage and also help strengthen your toes. This is especially beneficial for people who have arthritis associated with their bunions.        [email or web address]    [phone]

## 2018-05-21 ENCOUNTER — TELEPHONE (OUTPATIENT)
Dept: INTERNAL MEDICINE | Facility: CLINIC | Age: 58
End: 2018-05-21

## 2018-05-21 NOTE — TELEPHONE ENCOUNTER
Pt transferred from . She has numbness in her left lower leg. At first states it is from her knee down. Then she states it is her whole leg from left lower back.     Started on Thursday.     She is asking for OV with Dr Mai. No openings until next Wednesday. Offered appt with another provider tomorrow. She declined. She state she will go to [facility] by her house.

## 2018-05-23 ENCOUNTER — TELEPHONE (OUTPATIENT)
Dept: INTERNAL MEDICINE | Facility: CLINIC | Age: 58
End: 2018-05-23

## 2018-05-23 NOTE — TELEPHONE ENCOUNTER
Panel Management Review      Patient has the following on her problem list: None      Composite cancer screening  Chart review shows that this patient is due/due soon for the following Pap Smear  Summary:    Patient is due/failing the following:   PAP    Action needed:   Patient needs office visit for Pap smear.    Type of outreach:    Phone, spoke to patient.  Patient declines.    Questions for provider review:    None                                                                                                                                    Jes Mcpherson MA       Chart routed to closed .

## 2018-05-30 ENCOUNTER — OFFICE VISIT (OUTPATIENT)
Dept: INTERNAL MEDICINE | Facility: CLINIC | Age: 58
End: 2018-05-30
Payer: MEDICARE

## 2018-05-30 VITALS
OXYGEN SATURATION: 99 % | DIASTOLIC BLOOD PRESSURE: 68 MMHG | BODY MASS INDEX: 26.68 KG/M2 | WEIGHT: 166 LBS | TEMPERATURE: 98.4 F | RESPIRATION RATE: 17 BRPM | HEIGHT: 66 IN | HEART RATE: 75 BPM | SYSTOLIC BLOOD PRESSURE: 112 MMHG

## 2018-05-30 DIAGNOSIS — M54.42 CHRONIC LEFT-SIDED LOW BACK PAIN WITH LEFT-SIDED SCIATICA: Primary | ICD-10-CM

## 2018-05-30 DIAGNOSIS — G89.29 CHRONIC LEFT-SIDED LOW BACK PAIN WITH LEFT-SIDED SCIATICA: Primary | ICD-10-CM

## 2018-05-30 PROCEDURE — 99214 OFFICE O/P EST MOD 30 MIN: CPT | Performed by: INTERNAL MEDICINE

## 2018-05-30 RX ORDER — IBUPROFEN 600 MG/1
600 TABLET, FILM COATED ORAL EVERY 6 HOURS PRN
Qty: 90 TABLET | Refills: 1 | Status: SHIPPED | OUTPATIENT
Start: 2018-05-30 | End: 2018-11-19

## 2018-05-30 ASSESSMENT — ANXIETY QUESTIONNAIRES
3. WORRYING TOO MUCH ABOUT DIFFERENT THINGS: NOT AT ALL
7. FEELING AFRAID AS IF SOMETHING AWFUL MIGHT HAPPEN: NOT AT ALL
IF YOU CHECKED OFF ANY PROBLEMS ON THIS QUESTIONNAIRE, HOW DIFFICULT HAVE THESE PROBLEMS MADE IT FOR YOU TO DO YOUR WORK, TAKE CARE OF THINGS AT HOME, OR GET ALONG WITH OTHER PEOPLE: NOT DIFFICULT AT ALL
5. BEING SO RESTLESS THAT IT IS HARD TO SIT STILL: NOT AT ALL
2. NOT BEING ABLE TO STOP OR CONTROL WORRYING: NOT AT ALL
GAD7 TOTAL SCORE: 0
1. FEELING NERVOUS, ANXIOUS, OR ON EDGE: NOT AT ALL
6. BECOMING EASILY ANNOYED OR IRRITABLE: NOT AT ALL

## 2018-05-30 ASSESSMENT — PATIENT HEALTH QUESTIONNAIRE - PHQ9: 5. POOR APPETITE OR OVEREATING: NOT AT ALL

## 2018-05-30 NOTE — MR AVS SNAPSHOT
After Visit Summary   5/30/2018    Colette Patricia    MRN: [id number]           Patient Information     Date Of Birth          1960        Visit Information        Provider Department      5/30/2018 12:30 PM Ankur Mai MD; [employer] [facility]        Today's Diagnoses     Chronic left-sided low back pain with left-sided sciatica    -  1       Follow-ups after your visit        Additional Services     ORTHO  REFERRAL       [facility] is referring you to the Orthopedic  Services at [facility].       The  Representative will assist you in the coordination of your Orthopedic and Musculoskeletal Care as prescribed by your physician.    The  Representative will call you within 1 business day to help schedule your appointment, or you may contact the  Representative at:    All areas ~ [phone]     Type of Referral : Surgical / Specialist       Timeframe requested: 1 - 2 days      [facility], [phone]        Coverage of these services is subject to the terms and limitations of your health insurance plan.  Please call member services at your health plan with any benefit or coverage questions.      If X-rays, CT or MRI's have been performed, please contact the facility where they were done to arrange for , prior to your scheduled appointment.  Please bring this referral request to your appointment and present it to your specialist.                  Who to contact     If you have questions or need follow up information about today's clinic visit or your schedule please contact [facility] directly at [phone].  Normal or non-critical lab and imaging results will be communicated to you by MyChart, letter or phone within 4 business days after the clinic has received the results. If you do not hear from us within 7 days, please contact the  "clinic through [employer] or phone. If you have a critical or abnormal lab result, we will notify you by phone as soon as possible.  Submit refill requests through [employer] or call your pharmacy and they will forward the refill request to us. Please allow 3 business days for your refill to be completed.          Additional Information About Your Visit        Care EveryWhere ID     This is your Care EveryWhere ID. This could be used by other organizations to access your [facility] medical records  [id number]        Your Vitals Were     Pulse Temperature Respirations Height Last Period Pulse Oximetry    75 98.4  F (36.9  C) (Oral) 17 5' 6\" (1.676 m) 09/23/2004 99%    BMI (Body Mass Index)                   26.79 kg/m2            Blood Pressure from Last 3 Encounters:   05/30/18 112/68   02/19/18 121/82   08/09/17 117/78    Weight from Last 3 Encounters:   05/30/18 166 lb (75.3 kg)   03/12/18 164 lb (74.4 kg)   02/19/18 164 lb 9.6 oz (74.7 kg)              We Performed the Following     ORTHO  REFERRAL        Primary Care Provider Fax #    Physician No Ref-Primary [phone]       No address on file        Equal Access to Services     ELYSSA SELLERS : Haddaquan Claero, waaxda lurondaadaha, qaybta kaalmada adelexxda, amber galloway . So [facility] [phone].    ATENCIÓN: Si habla español, tiene a arroyo disposición servicios gratuitos de asistencia lingüística. Llame al [phone].    We comply with applicable federal civil rights laws and [place] laws. We do not discriminate on the basis of race, color, national origin, age, disability, sex, sexual orientation, or gender identity.            Thank you!     Thank you for choosing [facility]  for your care. Our goal is always to provide you with excellent care. Hearing back from our patients is one way we can continue to improve our services. Please take a few minutes to complete the written survey that you may " receive in the mail after your visit with us. Thank you!             Your Updated Medication List - Protect others around you: Learn how to safely use, store and throw away your medicines at www.disposemymeds.org.          This list is accurate as of 5/30/18  1:28 PM.  Always use your most recent med list.                   Brand Name Dispense Instructions for use Diagnosis    atorvastatin 20 MG tablet    LIPITOR    90 tablet    Take 1 tablet (20 mg) by mouth daily    Mixed hyperlipidemia       order for DME     1 Units    Lumbar support    Lumbar radicular pain       polyvinyl alcohol 1.4 % ophthalmic solution    ARTIFICIAL TEARS    6 mL    Place 1 drop into both eyes 2 times daily as needed for dry eyes    Dry eye       ranitidine 150 MG tablet    ZANTAC    180 tablet    Take 1 tablet (150 mg) by mouth 2 times daily    Gastroesophageal reflux disease without esophagitis

## 2018-05-30 NOTE — PROGRESS NOTES
SUBJECTIVE:   Colette Patricia is a 58 year old female who presents to clinic today for the following health issues:      Musculoskeletal problem/pain      Duration: 3 weeks    Description  Location: left leg    Intensity:  Moderate to severe    Accompanying signs and symptoms: numbness, tingling, weakness of leg and burning    History  Previous similar problem: no   Previous evaluation:  none    Precipitating or alleviating factors:  Trauma or overuse: no   Aggravating factors include: standing, climbing stairs, lifting, exercise and overuse    Therapies tried and outcome: Aleve      Problem list and histories reviewed & adjusted, as indicated.  Additional history: as documented    Patient Active Problem List   Diagnosis     Headache     Insomnia     Generalized osteoarthrosis, unspecified site     Other unspecified back disorder     Low back pain     GERD (gastroesophageal reflux disease)     Advanced directives, counseling/discussion     Mixed hyperlipidemia     Hyperlipidemia with target LDL less than 130     Gastroesophageal reflux disease, esophagitis presence not specified     Chronic left-sided low back pain without sciatica     Gastroesophageal reflux disease without esophagitis     Foraminal stenosis of lumbosacral region     Fatigue, unspecified type     Vitamin D deficiency     Osteopenia of spine     History reviewed. No pertinent surgical history.    Social History   Substance Use Topics     Smoking status: Never Smoker     Smokeless tobacco: Never Used     Alcohol use No     Family History   Problem Relation Age of Onset     Family History Negative Mother      Family History Negative Father      Glaucoma No family hx of      Macular Degeneration No family hx of            Reviewed and updated as needed this visit by clinical staff  Tobacco  Allergies  Meds  Med Hx  Surg Hx  Fam Hx  Soc Hx      Reviewed and updated as needed this visit by Provider         ROS:  Constitutional, HEENT,  "cardiovascular, pulmonary, gi and gu systems are negative, except as otherwise noted.    OBJECTIVE:     /68 (BP Location: Right arm, Patient Position: Sitting, Cuff Size: Adult Regular)  Pulse 75  Temp 98.4  F (36.9  C) (Oral)  Resp 17  Ht 5' 6\" (1.676 m)  Wt 166 lb (75.3 kg)  LMP 09/23/2004  SpO2 99%  BMI 26.79 kg/m2  Body mass index is 26.79 kg/(m^2).  GENERAL: healthy, alert and no distress  NECK: no adenopathy, no asymmetry, masses, or scars and thyroid normal to palpation  RESP: lungs clear to auscultation - no rales, rhonchi or wheezes  CV: regular rate and rhythm, normal S1 S2, no S3 or S4, no murmur, click or rub, no peripheral edema and peripheral pulses strong  ABDOMEN: soft, nontender, no hepatosplenomegaly, no masses and bowel sounds normal  MS: no gross musculoskeletal defects noted, no edema  Comprehensive back pain exam:  Tenderness of left paraspinal muscles, Pain limits the following motions: limited to due pain, Lower extremity strength functional and equal on both sides, Lower extremity sensation normal and equal on both sides and Straight leg positive on  left, indicating possible ipsilateral radiculopathy      Diagnostic Test Results:  none     ASSESSMENT/PLAN:       (M54.42,  G89.29) Chronic left-sided low back pain with left-sided sciatica  (primary encounter diagnosis)  Comment:     Reviewing notes back to 2010 and recent spine imaging, patient has clear evidence of radicular pain.  Two spine surgeons have recommended surgery for her but she has been hesitant.    Given her worsening left leg numbness/tingling I discussed with her she should strongly consider surgery.  She has had epidural injections before with minimal relief.  She completed PT sessions as well.     Patient requesting to see  at [facility] in [place]. Referral placed      Plan: ORTHO  REFERRAL, ibuprofen         (ADVIL/MOTRIN) 600 MG tablet            Ankur Mai MD  [facility]3] " [place]

## 2018-05-31 ASSESSMENT — ANXIETY QUESTIONNAIRES: GAD7 TOTAL SCORE: 0

## 2018-05-31 ASSESSMENT — PATIENT HEALTH QUESTIONNAIRE - PHQ9: SUM OF ALL RESPONSES TO PHQ QUESTIONS 1-9: 0

## 2018-06-20 ENCOUNTER — TELEPHONE (OUTPATIENT)
Dept: INTERNAL MEDICINE | Facility: CLINIC | Age: 58
End: 2018-06-20

## 2018-06-20 NOTE — TELEPHONE ENCOUNTER
Panel Management Review      Patient has the following on her problem list: None      Composite cancer screening  Chart review shows that this patient is due/due soon for the following Pap Smear  Summary:    Patient is due/failing the following:   PAP    Action needed:   Patient needs office visit for Physical and Pap.    Type of outreach:    No action needed. Patient declined on 5/23/18.    Questions for provider review:    None                                                                                                                                    Jes Mcpherson MA       Chart routed to closed .

## 2018-06-20 NOTE — LETTER
[facility]  [address], [address]  [place] [address]  [phone]        June 20, 2018    Colette Patricia  [address]   [place] [address]            Dear Ms. Colette Patricia:    In order to ensure we are providing the best quality care, we have reviewed your chart and see that you are due for:     1. Physical   2. Pap      Please call the clinic at your earliest convenience to schedule an appointment.   Thank you for trusting us with your health care.    Sincerely,        Dr. Ankur Mai

## 2018-06-21 ENCOUNTER — OFFICE VISIT (OUTPATIENT)
Dept: INTERNAL MEDICINE | Facility: CLINIC | Age: 58
End: 2018-06-21
Payer: MEDICARE

## 2018-06-21 VITALS
WEIGHT: 165.5 LBS | HEIGHT: 66 IN | SYSTOLIC BLOOD PRESSURE: 110 MMHG | BODY MASS INDEX: 26.6 KG/M2 | DIASTOLIC BLOOD PRESSURE: 78 MMHG | TEMPERATURE: 98.7 F | RESPIRATION RATE: 16 BRPM | OXYGEN SATURATION: 98 % | HEART RATE: 82 BPM

## 2018-06-21 DIAGNOSIS — E78.2 MIXED HYPERLIPIDEMIA: ICD-10-CM

## 2018-06-21 DIAGNOSIS — K21.9 GASTROESOPHAGEAL REFLUX DISEASE WITHOUT ESOPHAGITIS: ICD-10-CM

## 2018-06-21 DIAGNOSIS — J31.0 CHRONIC RHINITIS, UNSPECIFIED TYPE: Primary | ICD-10-CM

## 2018-06-21 PROCEDURE — 99214 OFFICE O/P EST MOD 30 MIN: CPT | Performed by: INTERNAL MEDICINE

## 2018-06-21 RX ORDER — FLUTICASONE PROPIONATE 50 MCG
1-2 SPRAY, SUSPENSION (ML) NASAL DAILY
Qty: 3 BOTTLE | Refills: 1 | Status: SHIPPED | OUTPATIENT
Start: 2018-06-21 | End: 2019-06-06

## 2018-06-21 RX ORDER — ATORVASTATIN CALCIUM 20 MG/1
20 TABLET, FILM COATED ORAL DAILY
Qty: 90 TABLET | Refills: 1 | Status: SHIPPED | OUTPATIENT
Start: 2018-06-21 | End: 2019-06-06

## 2018-06-21 NOTE — MR AVS SNAPSHOT
After Visit Summary   6/21/2018    Colette Patricia    MRN: [id number]           Patient Information     Date Of Birth          1960        Visit Information        Provider Department      6/21/2018 4:15 PM Ankur Mai MD; [employer] [facility]        Today's Diagnoses     Chronic rhinitis, unspecified type    -  1    Gastroesophageal reflux disease without esophagitis        Mixed hyperlipidemia          Care Instructions      Eating Heart-Healthy Food: Using the DASH Plan    Eating for your heart doesn t have to be hard or boring. You just need to know how to make healthier choices. The DASH eating plan has been developed to help you do just that. DASH stands for Dietary Approaches to Stop Hypertension. It is a plan that has been proven to be healthier for your heart and to lower your risk for high blood pressure. It can also help lower your risk for cancer, heart disease, osteoporosis, and diabetes.  Choosing from each food group  Choose foods from each of the food groups below each day. Try to get the recommended number of servings for each food group. The serving numbers are based on a diet of 2,000 calories a day. Talk to your doctor if you re unsure about your calorie needs. Along with getting the correct servings, the DASH plan also recommends a sodium intake less than 2,300 mg per day.        Grains  Servings: 6 to 8 a day  A serving is:    1 slice bread    1 ounce dry cereal    Half a cup cooked rice, pasta or cereal  Best choices: Whole grains and any grains high in fiber. Vegetables  Servings: 4 to 5 a day  A serving is:    1 cup raw leafy vegetable    Half a cup cut-up raw or cooked vegetable    Half a cup vegetable juice  Best choices: Fresh or frozen vegetables prepared without added salt or fat.   Fruits  Servings: 4 to 5 a day  A serving is:    1 medium fruit    One-quarter cup dried fruit    Half a cup fresh, frozen, or canned fruit    Half a  cup of 100% fruit juices  Best choices: A variety of fresh fruits of different colors. Whole fruits are a better choice than fruit juices. Low-fat or fat-free dairy  Servings: 2 to 3 a day  A serving is:    1 cup milk    1 cup yogurt    One and a half ounces cheese  Best choices: Skim or 1% milk, low-fat or fat-free yogurt or buttermilk, and low-fat cheeses.         Lean meats, poultry, fish  Servings: 6 or fewer a day  A serving is:    1 ounce cooked meats, poultry, or fish    1 egg  Best choices: Lean poultry and fish. Trim away visible fat. Broil, grill, roast, or boil instead of frying. Remove skin from poultry before eating. Limit how much red meat you eat.  Nuts, seeds, beans  Servings: 4 to 5 a week  A serving is:    One-third cup nuts (one and a half ounces)    2 tablespoons nut butter or seeds    Half a cup cooked dry beans or legumes  Best choices: Dry roasted nuts with no salt added, lentils, kidney beans, garbanzo beans, and whole johnson beans.   Fats and oils  Servings: 2 to 3 a day  A serving is:    1 teaspoon vegetable oil    1 teaspoon soft margarine    1 tablespoon mayonnaise    2 tablespoons salad dressing  Best choices: Nut and vegetable oils (nontropical vegetable oils), such as olive and canola oil. Sweets  Servings: 5 a week or fewer  A serving is:    1 tablespoon sugar, maple syrup, or honey    1 tablespoon jam or jelly    1 half-ounce jelly beans (about 15)    1 cup lemonade  Best choices: Dried fruit can be a satisfying sweet. Choose low-fat sweets. And watch your serving sizes!      For more on the DASH eating plan, visit:  www.nhlbi.nih.gov/health/health-topics/topics/dash   Date Last Reviewed: 6/1/2016 2000-2017 [employer]. [address] [place] [address], [place] [address]. All rights reserved. This information is not intended as a substitute for professional medical care. Always follow your healthcare professional's instructions.                Follow-ups after your visit       "  Who to contact     If you have questions or need follow up information about today's clinic visit or your schedule please contact [facility] directly at [phone].  Normal or non-critical lab and imaging results will be communicated to you by MyChart, letter or phone within 4 business days after the clinic has received the results. If you do not hear from us within 7 days, please contact the clinic through MyChart or phone. If you have a critical or abnormal lab result, we will notify you by phone as soon as possible.  Submit refill requests through [employer] or call your pharmacy and they will forward the refill request to us. Please allow 3 business days for your refill to be completed.          Additional Information About Your Visit        Care EveryWhere ID     This is your Care EveryWhere ID. This could be used by other organizations to access your [facility] medical records  [id number]        Your Vitals Were     Pulse Temperature Respirations Height Last Period Pulse Oximetry    82 98.7  F (37.1  C) (Oral) 16 5' 6\" (1.676 m) 09/23/2004 98%    BMI (Body Mass Index)                   26.71 kg/m2            Blood Pressure from Last 3 Encounters:   06/21/18 110/78   05/30/18 112/68   02/19/18 121/82    Weight from Last 3 Encounters:   06/21/18 165 lb 8 oz (75.1 kg)   05/30/18 166 lb (75.3 kg)   03/12/18 164 lb (74.4 kg)              Today, you had the following     No orders found for display         Today's Medication Changes          These changes are accurate as of 6/21/18  4:47 PM.  If you have any questions, ask your nurse or doctor.               Start taking these medicines.        Dose/Directions    fluticasone 50 MCG/ACT spray   Commonly known as:  FLONASE   Used for:  Chronic rhinitis, unspecified type   Started by:  nAkur Mai MD        Dose:  1-2 spray   Spray 1-2 sprays into both nostrils daily   Quantity:  3 Bottle   Refills:  1       omeprazole 20 MG CR capsule   Commonly " known as:  priLOSEC   Used for:  Gastroesophageal reflux disease without esophagitis   Started by:  Ankur Mai MD        Dose:  20 mg   Take 1 capsule (20 mg) by mouth daily   Quantity:  90 capsule   Refills:  1            Where to get your medicines      These medications were sent to [facility]/pharmacy #[id number] - [place], [place] - [address]  [address], [place] [address]     Phone:  [phone]     atorvastatin 20 MG tablet    fluticasone 50 MCG/ACT spray    omeprazole 20 MG CR capsule                Primary Care Provider Fax #    Physician No Ref-Primary [phone]       No address on file        Equal Access to Services     [facility]: Dagmar Calero, norma riggs, yoli arias, amber galloway . So [facility] [phone].    ATENCIÓN: Si habla español, tiene a arroyo disposición servicios gratuitos de asistencia lingüística. [facility] [phone].    We comply with applicable federal civil rights laws and [place] laws. We do not discriminate on the basis of race, color, national origin, age, disability, sex, sexual orientation, or gender identity.            Thank you!     Thank you for choosing [facility]  for your care. Our goal is always to provide you with excellent care. Hearing back from our patients is one way we can continue to improve our services. Please take a few minutes to complete the written survey that you may receive in the mail after your visit with us. Thank you!             Your Updated Medication List - Protect others around you: Learn how to safely use, store and throw away your medicines at www.disposemymeds.org.          This list is accurate as of 6/21/18  4:47 PM.  Always use your most recent med list.                   Brand Name Dispense Instructions for use Diagnosis    atorvastatin 20 MG tablet    LIPITOR    90 tablet    Take 1 tablet (20 mg) by mouth daily    Mixed hyperlipidemia        fluticasone 50 MCG/ACT spray    FLONASE    3 Bottle    Spray 1-2 sprays into both nostrils daily    Chronic rhinitis, unspecified type       ibuprofen 600 MG tablet    ADVIL/MOTRIN    90 tablet    Take 1 tablet (600 mg) by mouth every 6 hours as needed for moderate pain    Chronic left-sided low back pain with left-sided sciatica       omeprazole 20 MG CR capsule    priLOSEC    90 capsule    Take 1 capsule (20 mg) by mouth daily    Gastroesophageal reflux disease without esophagitis       polyvinyl alcohol 1.4 % ophthalmic solution    ARTIFICIAL TEARS    6 mL    Place 1 drop into both eyes 2 times daily as needed for dry eyes    Dry eye       ranitidine 150 MG tablet    ZANTAC    180 tablet    Take 1 tablet (150 mg) by mouth 2 times daily    Gastroesophageal reflux disease without esophagitis

## 2018-06-21 NOTE — PROGRESS NOTES
SUBJECTIVE:   Colette Patricia is a 58 year old female who presents to clinic today for the following health issues:    Sinus symptoms  Patient reports post-nasal drip, dry cough.  This has bothering her for sometime.  Some times, will get headaches.     Patient also with history of GERD, taking prilosec.    Last visit, patient was seen with history of chronic left-sided low back without sciatica.  Referral placed for second opinion with neurosurgery but patient holding off for now      Problem list and histories reviewed & adjusted, as indicated.  Additional history: as documented    Patient Active Problem List   Diagnosis     Headache     Insomnia     Generalized osteoarthrosis, unspecified site     Other unspecified back disorder     Low back pain     GERD (gastroesophageal reflux disease)     Advanced directives, counseling/discussion     Mixed hyperlipidemia     Hyperlipidemia with target LDL less than 130     Gastroesophageal reflux disease, esophagitis presence not specified     Chronic left-sided low back pain without sciatica     Gastroesophageal reflux disease without esophagitis     Foraminal stenosis of lumbosacral region     Fatigue, unspecified type     Vitamin D deficiency     Osteopenia of spine     History reviewed. No pertinent surgical history.    Social History   Substance Use Topics     Smoking status: Never Smoker     Smokeless tobacco: Never Used     Alcohol use No     Family History   Problem Relation Age of Onset     Family History Negative Mother      Family History Negative Father      Glaucoma No family hx of      Macular Degeneration No family hx of            Reviewed and updated as needed this visit by clinical staff  Tobacco  Allergies  Meds  Med Hx  Surg Hx  Fam Hx  Soc Hx      Reviewed and updated as needed this visit by Provider         ROS:  Constitutional, HEENT, cardiovascular, pulmonary, gi and gu systems are negative, except as otherwise noted.    OBJECTIVE:     BP  "110/78 (BP Location: Right arm, Patient Position: Sitting, Cuff Size: Adult Regular)  Pulse 82  Temp 98.7  F (37.1  C) (Oral)  Resp 16  Ht 5' 6\" (1.676 m)  Wt 165 lb 8 oz (75.1 kg)  LMP 09/23/2004  SpO2 98%  BMI 26.71 kg/m2  Body mass index is 26.71 kg/(m^2).  GENERAL: healthy, alert and no distress  HENT:nasal mucosa erythematous  NECK: no adenopathy, no asymmetry, masses, or scars and thyroid normal to palpation  RESP: lungs clear to auscultation - no rales, rhonchi or wheezes  CV: regular rate and rhythm, normal S1 S2, no S3 or S4, no murmur, click or rub, no peripheral edema and peripheral pulses strong  ABDOMEN: soft, nontender, no hepatosplenomegaly, no masses and bowel sounds normal  MS: no gross musculoskeletal defects noted, no edema    Diagnostic Test Results:  none     ASSESSMENT/PLAN:         1. Chronic rhinitis, unspecified type      Exam and symptoms consistent with chronic rhinitis     Will do a trial of flonase for now    - fluticasone (FLONASE) 50 MCG/ACT spray; Spray 1-2 sprays into both nostrils daily  Dispense: 3 Bottle; Refill: 1    2. Gastroesophageal reflux disease without esophagitis  PPI refilled, well controlled  - omeprazole (PRILOSEC) 20 MG CR capsule; Take 1 capsule (20 mg) by mouth daily  Dispense: 90 capsule; Refill: 1    3. Mixed hyperlipidemia  Continue lipitor per last lipid panel. Healthy eating habits discussed today  - atorvastatin (LIPITOR) 20 MG tablet; Take 1 tablet (20 mg) by mouth daily  Dispense: 90 tablet; Refill: 1        Ankur Mai MD  [facility]    "

## 2018-06-21 NOTE — NURSING NOTE
"Vital signs:  Temp: 98.7  F (37.1  C) Temp src: Oral BP: 110/78 Pulse: 82   Resp: 16 SpO2: 98 %     Height: 5' 6\" (167.6 cm) Weight: 165 lb 8 oz (75.1 kg)  Estimated body mass index is 26.71 kg/(m^2) as calculated from the following:    Height as of this encounter: 5' 6\" (1.676 m).    Weight as of this encounter: 165 lb 8 oz (75.1 kg).        She has headaches and feels like something is stuck in her throat.  "

## 2018-08-09 ENCOUNTER — TELEPHONE (OUTPATIENT)
Dept: INTERNAL MEDICINE | Facility: CLINIC | Age: 58
End: 2018-08-09

## 2018-08-09 NOTE — LETTER
[facility]  [address], [address]  [place] [address]  [phone]        August 9, 2018    Colette Patricia  [address]   [place] [address]            Dear Ms. Colette Patricia:    In order to ensure we are providing the best quality care, we have reviewed your chart and see that you are due for:     1. Physical   2. Pap      Please call the clinic at your earliest convenience to schedule an appointment.   Thank you for trusting us with your health care.    Sincerely,        Dr. Ankur Mai

## 2018-08-09 NOTE — TELEPHONE ENCOUNTER
Panel Management Review      Patient has the following on her problem list: None      Composite cancer screening  Chart review shows that this patient is due/due soon for the following Pap Smear  Summary:    Patient is due/failing the following:   PAP    Action needed:   Patient needs office visit for Physical & Pap.    Type of outreach:    Sent letter.    Questions for provider review:    None                                                                                                                                    Jes Mcpherson MA       Chart routed to Care Team .

## 2018-08-09 NOTE — LETTER
[facility]  [address], [address]  [place] [address]  [phone]        August 23, 2018    Colette Patricia  [address] [facility]   [place] [address]            Dear Ms. Colette Patricia:    We sent you a letter a couple of weeks ago informing you of health maintenance that is due. We hope that you received it. This letter is just a follow up to remind you to schedule an appointment.         Sincerely,        Your [facility] Care Team

## 2018-08-16 ENCOUNTER — TELEPHONE (OUTPATIENT)
Dept: INTERNAL MEDICINE | Facility: CLINIC | Age: 58
End: 2018-08-16

## 2018-09-06 ENCOUNTER — OFFICE VISIT (OUTPATIENT)
Dept: INTERNAL MEDICINE | Facility: CLINIC | Age: 58
End: 2018-09-06
Payer: MEDICARE

## 2018-09-06 VITALS
RESPIRATION RATE: 18 BRPM | BODY MASS INDEX: 27.26 KG/M2 | SYSTOLIC BLOOD PRESSURE: 111 MMHG | HEART RATE: 91 BPM | OXYGEN SATURATION: 97 % | TEMPERATURE: 98.2 F | WEIGHT: 169.6 LBS | DIASTOLIC BLOOD PRESSURE: 77 MMHG | HEIGHT: 66 IN

## 2018-09-06 DIAGNOSIS — M48.07 FORAMINAL STENOSIS OF LUMBOSACRAL REGION: Primary | ICD-10-CM

## 2018-09-06 PROCEDURE — 99214 OFFICE O/P EST MOD 30 MIN: CPT | Performed by: INTERNAL MEDICINE

## 2018-09-06 NOTE — PROGRESS NOTES
ASSESSMENT/PLAN:       1. Foraminal stenosis of lumbosacral region      Reviewing notes back to 2010 and recent spine imaging, patient has clear evidence of radicular pain.  Two spine surgeons have recommended surgery for her but she has been hesitant.     Given her worsening left leg numbness/tingling I discussed with her she should strongly consider surgery.  She has had epidural injections before with minimal relief.  She completed PT sessions as well.    Will repeat MRI per patient request but assured her this will likely show same findings, if not worsening.  Will discuss results with patient when they return to decide how to proceed    - MR Lumbar Spine w/o Contrast; Future        Ankur Mai MD  [facility]          SUBJECTIVE:   Colette Patricia is a 58 year old female who presents to clinic today for the following health issues:      Patient continues to have back pain, and numbness/tingling, weakness of her left leg particularly.  This has been ongoing for several years and patient has presented for similar symptoms earlier this year      She was evaluated by Dr. Christianson who recommended L5-S1 TLIF with possible extension to L4    Patient remains very hesitant to surgery.      Problem list and histories reviewed & adjusted, as indicated.  Additional history: as documented    Patient Active Problem List   Diagnosis     Headache     Insomnia     Generalized osteoarthrosis, unspecified site     Other unspecified back disorder     Low back pain     GERD (gastroesophageal reflux disease)     Advanced directives, counseling/discussion     Mixed hyperlipidemia     Hyperlipidemia with target LDL less than 130     Gastroesophageal reflux disease, esophagitis presence not specified     Chronic left-sided low back pain without sciatica     Gastroesophageal reflux disease without esophagitis     Foraminal stenosis of lumbosacral region     Fatigue, unspecified type     Vitamin D deficiency     Osteopenia  "of spine     History reviewed. No pertinent surgical history.    Social History   Substance Use Topics     Smoking status: Never Smoker     Smokeless tobacco: Never Used     Alcohol use No     Family History   Problem Relation Age of Onset     Family History Negative Mother      Family History Negative Father      Glaucoma No family hx of      Macular Degeneration No family hx of            Reviewed and updated as needed this visit by clinical staff  Tobacco  Allergies  Meds  Med Hx  Surg Hx  Fam Hx  Soc Hx      Reviewed and updated as needed this visit by Provider         ROS:  Constitutional, HEENT, cardiovascular, pulmonary, gi and gu systems are negative, except as otherwise noted.    OBJECTIVE:     /77 (BP Location: Left arm, Patient Position: Sitting, Cuff Size: Adult Regular)  Pulse 91  Temp 98.2  F (36.8  C) (Oral)  Resp 18  Ht 5' 6\" (1.676 m)  Wt 169 lb 9.6 oz (76.9 kg)  LMP 09/23/2004  SpO2 97%  BMI 27.37 kg/m2  Body mass index is 27.37 kg/(m^2).  GENERAL: healthy, alert and no distress  NECK: no adenopathy, no asymmetry, masses, or scars and thyroid normal to palpation  RESP: lungs clear to auscultation - no rales, rhonchi or wheezes  CV: regular rate and rhythm, normal S1 S2, no S3 or S4, no murmur, click or rub, no peripheral edema and peripheral pulses strong  ABDOMEN: soft, nontender, no hepatosplenomegaly, no masses and bowel sounds normal  MS: no gross musculoskeletal defects noted, no edema  Comprehensive back pain exam:  Tenderness of left paraspinal muscles, Pain limits the following motions: limited to due pain, 4/5 strength on left Lower extremity sensation normal and equal on both sides and Straight leg positive on  left, indicating possible ipsilateral radiculopathy    Diagnostic Test Results:  none         "

## 2018-09-06 NOTE — MR AVS SNAPSHOT
"              After Visit Summary   9/6/2018    Colette Patricia    MRN: [id number]           Patient Information     Date Of Birth          1960        Visit Information        Provider Department      9/6/2018 4:05 PM Ankru Mai MD; [employer] [facility]        Today's Diagnoses     Foraminal stenosis of lumbosacral region    -  1      Care Instructions    Please call Radiology:  [phone] to scheduling MRI              Follow-ups after your visit        Future tests that were ordered for you today     Open Future Orders        Priority Expected Expires Ordered    MR Lumbar Spine w/o Contrast Routine  9/6/2019 9/6/2018            Who to contact     If you have questions or need follow up information about today's clinic visit or your schedule please contact [facility] directly at [phone].  Normal or non-critical lab and imaging results will be communicated to you by MyChart, letter or phone within 4 business days after the clinic has received the results. If you do not hear from us within 7 days, please contact the clinic through MyChart or phone. If you have a critical or abnormal lab result, we will notify you by phone as soon as possible.  Submit refill requests through [employer] or call your pharmacy and they will forward the refill request to us. Please allow 3 business days for your refill to be completed.          Additional Information About Your Visit        Care EveryWhere ID     This is your Care EveryWhere ID. This could be used by other organizations to access your [facility] medical records  [id number]        Your Vitals Were     Pulse Temperature Respirations Height Last Period Pulse Oximetry    91 98.2  F (36.8  C) (Oral) 18 5' 6\" (1.676 m) 09/23/2004 97%    BMI (Body Mass Index)                   27.37 kg/m2            Blood Pressure from Last 3 Encounters:   09/06/18 111/77   06/21/18 110/78   05/30/18 112/68    Weight from Last 3 " Encounters:   09/06/18 169 lb 9.6 oz (76.9 kg)   06/21/18 165 lb 8 oz (75.1 kg)   05/30/18 166 lb (75.3 kg)               Primary Care Provider Fax #    Physician No Ref-Primary [phone]       No address on file        Equal Access to Services     ELYSSA SARAVIADEREK : Hadii jaci ku hadsmithao Soomaali, waaxda luqadaha, qaybta kaalmada adeterrell, amber davalos sanjurasheed lynch mandynorma rodriguez. So [facility] [phone].    ATENCIÓN: Si habla español, tiene a arroyo disposición servicios gratuitos de asistencia lingüística. Llame al [phone].    We comply with applicable federal civil rights laws and [place] laws. We do not discriminate on the basis of race, color, national origin, age, disability, sex, sexual orientation, or gender identity.            Thank you!     Thank you for choosing [facility]  for your care. Our goal is always to provide you with excellent care. Hearing back from our patients is one way we can continue to improve our services. Please take a few minutes to complete the written survey that you may receive in the mail after your visit with us. Thank you!             Your Updated Medication List - Protect others around you: Learn how to safely use, store and throw away your medicines at www.disposemymeds.org.          This list is accurate as of 9/6/18  5:22 PM.  Always use your most recent med list.                   Brand Name Dispense Instructions for use Diagnosis    atorvastatin 20 MG tablet    LIPITOR    90 tablet    Take 1 tablet (20 mg) by mouth daily    Mixed hyperlipidemia       fluticasone 50 MCG/ACT spray    FLONASE    3 Bottle    Spray 1-2 sprays into both nostrils daily    Chronic rhinitis, unspecified type       ibuprofen 600 MG tablet    ADVIL/MOTRIN    90 tablet    Take 1 tablet (600 mg) by mouth every 6 hours as needed for moderate pain    Chronic left-sided low back pain with left-sided sciatica       omeprazole 20 MG CR capsule    priLOSEC    90 capsule    Take 1 capsule  (20 mg) by mouth daily    Gastroesophageal reflux disease without esophagitis       polyvinyl alcohol 1.4 % ophthalmic solution    ARTIFICIAL TEARS    6 mL    Place 1 drop into both eyes 2 times daily as needed for dry eyes    Dry eye

## 2018-09-06 NOTE — NURSING NOTE
"Vital signs:  Temp: 98.2  F (36.8  C) Temp src: Oral BP: 111/77 Pulse: 91   Resp: 18 SpO2: 97 %     Height: 5' 6\" (167.6 cm) Weight: 169 lb 9.6 oz (76.9 kg)  Estimated body mass index is 27.37 kg/(m^2) as calculated from the following:    Height as of this encounter: 5' 6\" (1.676 m).    Weight as of this encounter: 169 lb 9.6 oz (76.9 kg).          "

## 2018-09-22 ENCOUNTER — HOSPITAL ENCOUNTER (OUTPATIENT)
Dept: MRI IMAGING | Facility: CLINIC | Age: 58
Discharge: HOME | End: 2018-09-22
Admitting: INTERNAL MEDICINE
Payer: MEDICARE

## 2018-09-22 DIAGNOSIS — M48.07 FORAMINAL STENOSIS OF LUMBOSACRAL REGION: ICD-10-CM

## 2018-09-22 PROCEDURE — 72148 MRI LUMBAR SPINE W/O DYE: CPT

## 2018-10-03 ENCOUNTER — OFFICE VISIT (OUTPATIENT)
Dept: INTERNAL MEDICINE | Facility: CLINIC | Age: 58
End: 2018-10-03
Payer: MEDICARE

## 2018-10-03 VITALS
WEIGHT: 171.5 LBS | SYSTOLIC BLOOD PRESSURE: 112 MMHG | HEIGHT: 66 IN | RESPIRATION RATE: 14 BRPM | DIASTOLIC BLOOD PRESSURE: 71 MMHG | BODY MASS INDEX: 27.56 KG/M2 | TEMPERATURE: 98.3 F | OXYGEN SATURATION: 96 % | HEART RATE: 107 BPM

## 2018-10-03 DIAGNOSIS — M48.07 FORAMINAL STENOSIS OF LUMBOSACRAL REGION: Primary | ICD-10-CM

## 2018-10-03 PROCEDURE — 99213 OFFICE O/P EST LOW 20 MIN: CPT | Performed by: INTERNAL MEDICINE

## 2018-10-03 NOTE — MR AVS SNAPSHOT
"              After Visit Summary   10/3/2018    Colette Patricia    MRN: [id number]           Patient Information     Date Of Birth          1960        Visit Information        Provider Department      10/3/2018 4:25 PM Ankur Mai MD; [employer] [facility]        Today's Diagnoses     Foraminal stenosis of lumbosacral region    -  1       Follow-ups after your visit        Who to contact     If you have questions or need follow up information about today's clinic visit or your schedule please contact [facility] directly at [phone].  Normal or non-critical lab and imaging results will be communicated to you by MyChart, letter or phone within 4 business days after the clinic has received the results. If you do not hear from us within 7 days, please contact the clinic through MyChart or phone. If you have a critical or abnormal lab result, we will notify you by phone as soon as possible.  Submit refill requests through [employer] or call your pharmacy and they will forward the refill request to us. Please allow 3 business days for your refill to be completed.          Additional Information About Your Visit        Care EveryWhere ID     This is your Care EveryWhere ID. This could be used by other organizations to access your [facility] medical records  [id number]        Your Vitals Were     Pulse Temperature Respirations Height Last Period Pulse Oximetry    107 98.3  F (36.8  C) (Oral) 14 5' 6\" (1.676 m) 09/23/2004 96%    BMI (Body Mass Index)                   27.68 kg/m2            Blood Pressure from Last 3 Encounters:   10/03/18 112/71   09/06/18 111/77   06/21/18 110/78    Weight from Last 3 Encounters:   10/03/18 171 lb 8 oz (77.8 kg)   09/06/18 169 lb 9.6 oz (76.9 kg)   06/21/18 165 lb 8 oz (75.1 kg)              Today, you had the following     No orders found for display       Primary Care Provider Fax #    Physician No Ref-Primary [phone]    "    No address on file        Equal Access to Services     FRANKI MARLO : Hadii aad nancy kiki Calero, wavicda luqjessa, qaybta kaalfinn crisgracielamiah, waxakosua susannah colliermikaylanorma rodriguez. So [facility] [phone].    ATENCIÓN: Si habla español, tiene a arroyo disposición servicios gratuitos de asistencia lingüística. Ll[facility] [phone].    We comply with applicable federal civil rights laws and [place] laws. We do not discriminate on the basis of race, color, national origin, age, disability, sex, sexual orientation, or gender identity.            Thank you!     Thank you for choosing [facility]  for your care. Our goal is always to provide you with excellent care. Hearing back from our patients is one way we can continue to improve our services. Please take a few minutes to complete the written survey that you may receive in the mail after your visit with us. Thank you!             Your Updated Medication List - Protect others around you: Learn how to safely use, store and throw away your medicines at www.disposemymeds.org.          This list is accurate as of 10/3/18 11:59 PM.  Always use your most recent med list.                   Brand Name Dispense Instructions for use Diagnosis    atorvastatin 20 MG tablet    LIPITOR    90 tablet    Take 1 tablet (20 mg) by mouth daily    Mixed hyperlipidemia       fluticasone 50 MCG/ACT spray    FLONASE    3 Bottle    Spray 1-2 sprays into both nostrils daily    Chronic rhinitis, unspecified type       ibuprofen 600 MG tablet    ADVIL/MOTRIN    90 tablet    Take 1 tablet (600 mg) by mouth every 6 hours as needed for moderate pain    Chronic left-sided low back pain with left-sided sciatica       omeprazole 20 MG CR capsule    priLOSEC    90 capsule    Take 1 capsule (20 mg) by mouth daily    Gastroesophageal reflux disease without esophagitis       polyvinyl alcohol 1.4 % ophthalmic solution    ARTIFICIAL TEARS    6 mL    Place 1 drop into both eyes 2 times  daily as needed for dry eyes    Dry eye

## 2018-10-03 NOTE — NURSING NOTE
"Vital signs:  Temp: 98.3  F (36.8  C) Temp src: Oral BP: 112/71 Pulse: 107   Resp: 14 SpO2: 96 %     Height: 5' 6\" (167.6 cm) Weight: 171 lb 8 oz (77.8 kg)  Estimated body mass index is 27.68 kg/(m^2) as calculated from the following:    Height as of this encounter: 5' 6\" (1.676 m).    Weight as of this encounter: 171 lb 8 oz (77.8 kg).          "

## 2018-10-03 NOTE — PROGRESS NOTES
ASSESSMENT/PLAN:         1. Foraminal stenosis of lumbosacral region    Patient has evidence of radicular pain, and continued left leg numbness/tingling.     I have reviewed the MRI from 2017 compared to the new MRI now and I do believe it is slightly worse.  She has seen both Dr. Witt and Dr. Christianson who have both recommended surgery. She has been thinking about this for some time, but now has decided to proceed and would like to go with Dr. Witt.  She saw him back in 6/2018 however I cannot see notes in system.  I will try to contact Dr. Witt to send the new MRI report and get his thoughts on this.      I Spent 20 minutes with patient today, >50% of time discussing MRI results and plan           Ankur Mai MD  [facility]        SUBJECTIVE:   Colette Patricia is a 58 year old female who presents to clinic today for the following health issues:    Patient is here for follow up on MRI results    Her most recent MRI report show: 9/22/18  IMPRESSION:  L5-S1: Grade 2 spondylolytic spondylolisthesis. Severe  bilateral foraminal stenosis, similar to the previous exam.     She continues to have left leg pain and numbness/tinlging along with back pain, but currently this is controlled by use of ibuprofen intermittently.  No other changes since last visit      Problem list and histories reviewed & adjusted, as indicated.  Additional history: as documented    Patient Active Problem List   Diagnosis     Headache     Insomnia     Generalized osteoarthrosis, unspecified site     Other unspecified back disorder     Low back pain     GERD (gastroesophageal reflux disease)     Advanced directives, counseling/discussion     Mixed hyperlipidemia     Hyperlipidemia with target LDL less than 130     Gastroesophageal reflux disease, esophagitis presence not specified     Chronic left-sided low back pain without sciatica     Gastroesophageal reflux disease without esophagitis     Foraminal stenosis of lumbosacral  "region     Fatigue, unspecified type     Vitamin D deficiency     Osteopenia of spine     History reviewed. No pertinent surgical history.    Social History   Substance Use Topics     Smoking status: Never Smoker     Smokeless tobacco: Never Used     Alcohol use No     Family History   Problem Relation Age of Onset     Family History Negative Mother      Family History Negative Father      Glaucoma No family hx of      Macular Degeneration No family hx of            Reviewed and updated as needed this visit by clinical staff  Tobacco  Allergies  Meds  Med Hx  Surg Hx  Fam Hx  Soc Hx      Reviewed and updated as needed this visit by Provider         ROS:  Constitutional, HEENT, cardiovascular, pulmonary, gi and gu systems are negative, except as otherwise noted.    OBJECTIVE:     /71 (BP Location: Right arm, Patient Position: Sitting, Cuff Size: Adult Regular)  Pulse 107  Temp 98.3  F (36.8  C) (Oral)  Resp 14  Ht 5' 6\" (1.676 m)  Wt 171 lb 8 oz (77.8 kg)  LMP 09/23/2004  SpO2 96%  BMI 27.68 kg/m2  Body mass index is 27.68 kg/(m^2).  GENERAL: healthy, alert and no distress  MS: no gross musculoskeletal defects noted, no edema  Full exam not done today    Diagnostic Test Results:  none     \    "

## 2018-10-09 ENCOUNTER — TELEPHONE (OUTPATIENT)
Dept: INTERNAL MEDICINE | Facility: CLINIC | Age: 58
End: 2018-10-09

## 2018-10-09 NOTE — LETTER
[facility]  [address], [address]  [place] [address]  [phone]        October 31, 2018    Colette Patricia  [address] [facility]   [place] [address]            Dear Ms. Colette Patricia:    We sent you a letter a couple of weeks ago informing you of health maintenance that is due. We hope that you received it. This letter is just a follow up to remind you to schedule an appointment.         Sincerely,        Your [facility] Care Team

## 2018-10-09 NOTE — LETTER
[facility]  [address], [address]  [place] [address]  [phone]        October 9, 2018    Colette Patricia  [address]   [place] [address]            Dear Ms. Colette Patricia:    In order to ensure we are providing the best quality care, we have reviewed your chart and see that you are due for a colonoscopy.  Please call the clinic at your earliest convenience to schedule an appointment.  If you already had this done somewhere else, please give us a call with the information so that we could update your chart.  Thank you for trusting us with your health care.    Sincerely,        Dr. Ankur Mai

## 2018-10-09 NOTE — TELEPHONE ENCOUNTER
Panel Management Review      Patient has the following on her problem list: None      Composite cancer screening  Chart review shows that this patient is due/due soon for the following Colonoscopy  Summary:    Patient is due/failing the following:   COLONOSCOPY    Action needed:   Patient needs office visit for colonoscopy.    Type of outreach:    Sent letter.    Questions for provider review:    None                                                                                                                                    Jes Mcpherson MA       Chart routed to none .

## 2018-11-19 ENCOUNTER — OFFICE VISIT (OUTPATIENT)
Dept: INTERNAL MEDICINE | Facility: CLINIC | Age: 58
End: 2018-11-19
Payer: MEDICARE

## 2018-11-19 VITALS
OXYGEN SATURATION: 98 % | TEMPERATURE: 98.5 F | DIASTOLIC BLOOD PRESSURE: 70 MMHG | HEART RATE: 82 BPM | BODY MASS INDEX: 27.26 KG/M2 | RESPIRATION RATE: 16 BRPM | HEIGHT: 66 IN | WEIGHT: 169.6 LBS | SYSTOLIC BLOOD PRESSURE: 110 MMHG

## 2018-11-19 DIAGNOSIS — H66.90 ACUTE OTITIS MEDIA, UNSPECIFIED OTITIS MEDIA TYPE: Primary | ICD-10-CM

## 2018-11-19 DIAGNOSIS — M54.42 CHRONIC LEFT-SIDED LOW BACK PAIN WITH LEFT-SIDED SCIATICA: ICD-10-CM

## 2018-11-19 DIAGNOSIS — G89.29 CHRONIC LEFT-SIDED LOW BACK PAIN WITH LEFT-SIDED SCIATICA: ICD-10-CM

## 2018-11-19 PROCEDURE — 99214 OFFICE O/P EST MOD 30 MIN: CPT | Performed by: INTERNAL MEDICINE

## 2018-11-19 RX ORDER — IBUPROFEN 600 MG/1
600 TABLET, FILM COATED ORAL EVERY 6 HOURS PRN
Qty: 90 TABLET | Refills: 1 | Status: SHIPPED | OUTPATIENT
Start: 2018-11-19 | End: 2021-07-02

## 2018-11-19 RX ORDER — CIPROFLOXACIN 0.5 MG/.25ML
1 SOLUTION/ DROPS AURICULAR (OTIC) 2 TIMES DAILY
Qty: 3.5 ML | Refills: 1 | Status: SHIPPED | OUTPATIENT
Start: 2018-11-19 | End: 2018-11-26

## 2018-11-19 ASSESSMENT — PAIN SCALES - GENERAL: PAINLEVEL: SEVERE PAIN (6)

## 2018-11-19 NOTE — MR AVS SNAPSHOT
After Visit Summary   11/19/2018    Colette Patricia    MRN: [id number]           Patient Information     Date Of Birth          1960        Visit Information        Provider Department      11/19/2018 1:45 PM Ankur Mai MD; [employer] [facility]        Today's Diagnoses     Acute otitis media, unspecified otitis media type    -  1    Chronic left-sided low back pain with left-sided sciatica           Follow-ups after your visit        Additional Services     PHYSICAL THERAPY REFERRAL       If you have not heard from the scheduling office within 2 business days, please call [phone] for all locations, with the exception of [place], please call [phone] and [place], please call [phone].    Please be aware that coverage of these services is subject to the terms and limitations of your health insurance plan.  Call member services at your health plan with any benefit or coverage questions.                  Who to contact     If you have questions or need follow up information about today's clinic visit or your schedule please contact [facility] directly at [phone].  Normal or non-critical lab and imaging results will be communicated to you by MyChart, letter or phone within 4 business days after the clinic has received the results. If you do not hear from us within 7 days, please contact the clinic through MyChart or phone. If you have a critical or abnormal lab result, we will notify you by phone as soon as possible.  Submit refill requests through [employer] or call your pharmacy and they will forward the refill request to us. Please allow 3 business days for your refill to be completed.          Additional Information About Your Visit        Care EveryWhere ID     This is your Care EveryWhere ID. This could be used by other organizations to access your [facility] medical records  [id number]        Your Vitals Were     Pulse  "Temperature Respirations Height Last Period Pulse Oximetry    82 98.5  F (36.9  C) (Oral) 16 5' 6\" (1.676 m) 09/23/2004 98%    BMI (Body Mass Index)                   27.37 kg/m2            Blood Pressure from Last 3 Encounters:   11/19/18 110/70   10/03/18 112/71   09/06/18 111/77    Weight from Last 3 Encounters:   11/19/18 169 lb 9.6 oz (76.9 kg)   10/03/18 171 lb 8 oz (77.8 kg)   09/06/18 169 lb 9.6 oz (76.9 kg)                 Today's Medication Changes          These changes are accurate as of 11/19/18 11:59 PM.  If you have any questions, ask your nurse or doctor.               Start taking these medicines.        Dose/Directions    ciprofloxacin 0.2 % otic solution   Commonly known as:  CETRAXAL   Used for:  Acute otitis media, unspecified otitis media type   Started by:  Ankur Mai MD        Dose:  1 Dropperette   Place 0.25 mLs (1 Dropperette) Into the left ear 2 times daily for 7 days   Quantity:  3.5 mL   Refills:  1            Where to get your medicines      These medications were sent to [facility]/pharmacy #[id number] - [place], [place] - [address], [place] [address]     Phone:  [phone]     ciprofloxacin 0.2 % otic solution    ibuprofen 600 MG tablet                Primary Care Provider Fax #    Physician No Ref-Primary [phone]       No address on file        Equal Access to Services     ELYSSA SELLERS AH: Hadii aad ku hadasho Soalfredoali, waaxda luqadaha, qaybta kaalmada adeegyada, waxay susannah de la rosa ademackenzie galloway . So [facility] [phone].    ATENCIÓN: Si habla carmina, tiene a arroyo disposición servicios gratuitos de asistencia lingüística. Llame al [phone].    We comply with applicable federal civil rights laws and [place] laws. We do not discriminate on the basis of race, color, national origin, age, disability, sex, sexual orientation, or gender identity.            Thank you!     Thank you for choosing [facility]  for your care. " Our goal is always to provide you with excellent care. Hearing back from our patients is one way we can continue to improve our services. Please take a few minutes to complete the written survey that you may receive in the mail after your visit with us. Thank you!             Your Updated Medication List - Protect others around you: Learn how to safely use, store and throw away your medicines at www.disposemymeds.org.          This list is accurate as of 11/19/18 11:59 PM.  Always use your most recent med list.                   Brand Name Dispense Instructions for use Diagnosis    atorvastatin 20 MG tablet    LIPITOR    90 tablet    Take 1 tablet (20 mg) by mouth daily    Mixed hyperlipidemia       ciprofloxacin 0.2 % otic solution    CETRAXAL    3.5 mL    Place 0.25 mLs (1 Dropperette) Into the left ear 2 times daily for 7 days    Acute otitis media, unspecified otitis media type       fluticasone 50 MCG/ACT spray    FLONASE    3 Bottle    Spray 1-2 sprays into both nostrils daily    Chronic rhinitis, unspecified type       ibuprofen 600 MG tablet    ADVIL/MOTRIN    90 tablet    Take 1 tablet (600 mg) by mouth every 6 hours as needed for moderate pain    Chronic left-sided low back pain with left-sided sciatica       omeprazole 20 MG CR capsule    priLOSEC    90 capsule    Take 1 capsule (20 mg) by mouth daily    Gastroesophageal reflux disease without esophagitis       polyvinyl alcohol 1.4 % ophthalmic solution    ARTIFICIAL TEARS    6 mL    Place 1 drop into both eyes 2 times daily as needed for dry eyes    Dry eye

## 2018-11-19 NOTE — PROGRESS NOTES
ASSESSMENT/PLAN:         1. Acute otitis media, unspecified otitis media type  Will start with cipro drops to the ear.  If no improvement, can do trial of augmentin  - ciprofloxacin (CETRAXAL) 0.2 % otic solution; Place 0.25 mLs (1 Dropperette) Into the left ear 2 times daily for 7 days  Dispense: 3.5 mL; Refill: 1    2. Chronic left-sided low back pain with left-sided sciatica  Ibuprofen to use sparingly, discussed about side effects  Follow up with spine surgeon Dr. Witt  - ibuprofen (ADVIL/MOTRIN) 600 MG tablet; Take 1 tablet (600 mg) by mouth every 6 hours as needed for moderate pain  Dispense: 90 tablet; Refill: 1  - PHYSICAL THERAPY REFERRAL; Future      Ankur Mai MD  [facility]      SUBJECTIVE:   Colette Patricia is a 58 year old female who presents to clinic today for the following health issues:    She has pain in her left ear for 2 weeks.  She also associated headaches, these are bilateral and frontal. No dizziness, no numbness/tingling.     She also has some tinnitus with this, it bothers her to use phone    She denies preceding URI, sore throat, other associated symptoms.  No fevers.    In regards to her back pain, she will see Dr. Witt this week        Problem list and histories reviewed & adjusted, as indicated.  Additional history: as documented    Patient Active Problem List   Diagnosis     Headache     Insomnia     Generalized osteoarthrosis, unspecified site     Other unspecified back disorder     Low back pain     GERD (gastroesophageal reflux disease)     Advanced directives, counseling/discussion     Mixed hyperlipidemia     Hyperlipidemia with target LDL less than 130     Gastroesophageal reflux disease, esophagitis presence not specified     Chronic left-sided low back pain without sciatica     Gastroesophageal reflux disease without esophagitis     Foraminal stenosis of lumbosacral region     Fatigue, unspecified type     Vitamin D deficiency     Osteopenia of spine      "History reviewed. No pertinent surgical history.    Social History   Substance Use Topics     Smoking status: Never Smoker     Smokeless tobacco: Never Used     Alcohol use No     Family History   Problem Relation Age of Onset     Family History Negative Mother      Family History Negative Father      Glaucoma No family hx of      Macular Degeneration No family hx of            Reviewed and updated as needed this visit by clinical staff  Tobacco  Allergies  Meds  Med Hx  Surg Hx  Fam Hx  Soc Hx      Reviewed and updated as needed this visit by Provider         ROS:  Constitutional, HEENT, cardiovascular, pulmonary, gi and gu systems are negative, except as otherwise noted.    OBJECTIVE:     /70 (BP Location: Right arm, Patient Position: Sitting, Cuff Size: Adult Regular)  Pulse 82  Temp 98.5  F (36.9  C) (Oral)  Resp 16  Ht 5' 6\" (1.676 m)  Wt 169 lb 9.6 oz (76.9 kg)  LMP 09/23/2004  SpO2 98%  BMI 27.37 kg/m2  Body mass index is 27.37 kg/(m^2).  GENERAL: healthy, alert and no distress  HENT: left TM with cloudiness, light reflex absent  NECK: no adenopathy, no asymmetry, masses, or scars and thyroid normal to palpation  RESP: lungs clear to auscultation - no rales, rhonchi or wheezes  CV: regular rate and rhythm, normal S1 S2, no S3 or S4, no murmur  MS: no gross musculoskeletal defects noted, no edema    Diagnostic Test Results:  none         "

## 2018-11-19 NOTE — NURSING NOTE
"Vital signs:  Temp: 98.5  F (36.9  C) Temp src: Oral BP: 110/70 Pulse: 82   Resp: 16 SpO2: 98 %     Height: 5' 6\" (167.6 cm) Weight: 169 lb 9.6 oz (76.9 kg)  Estimated body mass index is 27.37 kg/(m^2) as calculated from the following:    Height as of this encounter: 5' 6\" (1.676 m).    Weight as of this encounter: 169 lb 9.6 oz (76.9 kg).          "

## 2018-11-20 ENCOUNTER — TRANSFERRED RECORDS (OUTPATIENT)
Dept: HEALTH INFORMATION MANAGEMENT | Facility: CLINIC | Age: 58
End: 2018-11-20

## 2018-12-31 ENCOUNTER — TELEPHONE (OUTPATIENT)
Dept: INTERNAL MEDICINE | Facility: CLINIC | Age: 58
End: 2018-12-31

## 2018-12-31 NOTE — LETTER
[facility] [place] [facility]  [address], [address]  [place] [address]  [phone]        December 31, 2018    Colette Patricia  [address]   [place] [address]            Dear Colette,      In order to ensure we are providing the best quality care, we have reviewed your chart and see that you are due for pap smear.  Please call the clinic at your earliest convenience to schedule an appointment. If you already had this done somewhere else, please call our clinic with the information so we could update your chart.  Thank you for trusting us with your health care.    Sincerely,        Dr. Ankur Mai

## 2018-12-31 NOTE — LETTER
[facility]  [address], [address]  [place] [address]  [phone]        January 18, 2019    Colette Patricia  [address] [facility]   [place] [address]            Dear Colette,    We sent you a letter a couple of weeks ago informing you of health maintenance that is due. We hope that you received it. This letter is just a follow up to remind you to schedule an appointment.           Sincerely,        Your [facility]

## 2018-12-31 NOTE — TELEPHONE ENCOUNTER
Panel Management Review      Patient has the following on her problem list: None      Composite cancer screening  Chart review shows that this patient is due/due soon for the following Pap Smear  Summary:    Patient is due/failing the following:   PAP    Action needed:   Patient needs office visit for a pap.    Type of outreach:    Sent letter.    Questions for provider review:    None                                                                                                                                    Jes Mcpherson MA     Chart routed to none .

## 2019-01-07 DIAGNOSIS — K21.9 GASTROESOPHAGEAL REFLUX DISEASE WITHOUT ESOPHAGITIS: ICD-10-CM

## 2019-01-09 NOTE — TELEPHONE ENCOUNTER
Desirae from pharmacy calls requesting that this refill be bumped to the front of the line because it has been 3 days.

## 2019-01-15 ENCOUNTER — OFFICE VISIT (OUTPATIENT)
Dept: FAMILY MEDICINE | Facility: CLINIC | Age: 59
End: 2019-01-15
Payer: COMMERCIAL

## 2019-01-15 VITALS
TEMPERATURE: 98.8 F | BODY MASS INDEX: 26.95 KG/M2 | SYSTOLIC BLOOD PRESSURE: 110 MMHG | DIASTOLIC BLOOD PRESSURE: 68 MMHG | OXYGEN SATURATION: 99 % | RESPIRATION RATE: 16 BRPM | WEIGHT: 167 LBS | HEART RATE: 77 BPM

## 2019-01-15 DIAGNOSIS — M48.07 FORAMINAL STENOSIS OF LUMBOSACRAL REGION: Primary | ICD-10-CM

## 2019-01-15 DIAGNOSIS — M85.88 OSTEOPENIA OF SPINE: ICD-10-CM

## 2019-01-15 DIAGNOSIS — M54.50 BILATERAL LOW BACK PAIN WITHOUT SCIATICA, UNSPECIFIED CHRONICITY: ICD-10-CM

## 2019-01-15 PROCEDURE — 99214 OFFICE O/P EST MOD 30 MIN: CPT | Performed by: FAMILY MEDICINE

## 2019-01-15 NOTE — PROGRESS NOTES
SUBJECTIVE:   Colette Patricia is a 58 year old female who presents to clinic today for the following health issues:      Concern - arthritis  Onset: 15 years    Description:   Pain in both knees, and both wrists    Intensity: 7-8/10    Progression of Symptoms:  Worsening with cold weather    Accompanying Signs & Symptoms:  none    Previous history of similar problem:   yes    Precipitating factors:   Worsened by: walking, sitting    Alleviating factors:  Improved by: ibuprofen    Therapies Tried and outcome: ibuprofen helps        Problem list and histories reviewed & adjusted, as indicated.  Additional history: as documented      Patient Active Problem List   Diagnosis     Headache     Insomnia     Generalized osteoarthrosis, unspecified site     Other unspecified back disorder     Low back pain     GERD (gastroesophageal reflux disease)     Advanced directives, counseling/discussion     Mixed hyperlipidemia     Hyperlipidemia with target LDL less than 130     Gastroesophageal reflux disease, esophagitis presence not specified     Chronic left-sided low back pain without sciatica     Gastroesophageal reflux disease without esophagitis     Foraminal stenosis of lumbosacral region     Fatigue, unspecified type     Vitamin D deficiency     Osteopenia of spine     History reviewed. No pertinent surgical history.    Social History     Tobacco Use     Smoking status: Never Smoker     Smokeless tobacco: Never Used   Substance Use Topics     Alcohol use: No     Family History   Problem Relation Age of Onset     Family History Negative Mother      Family History Negative Father      Glaucoma No family hx of      Macular Degeneration No family hx of          Current Outpatient Medications   Medication Sig Dispense Refill     atorvastatin (LIPITOR) 20 MG tablet Take 1 tablet (20 mg) by mouth daily 90 tablet 1     diclofenac (VOLTAREN) 1 % topical gel FOUR TIMES DAILY  KNEES   LOWERBACK 200 g 11     fluticasone (FLONASE) 50  MCG/ACT spray Spray 1-2 sprays into both nostrils daily 3 Bottle 1     Glucosamine HCl-MSM (SM GLUCOSAMINE HCL-MSM) 750-750 MG TABS Take 2 tablets by mouth daily 60 tablet 11     ibuprofen (ADVIL/MOTRIN) 600 MG tablet Take 1 tablet (600 mg) by mouth every 6 hours as needed for moderate pain 90 tablet 1     omeprazole (PRILOSEC) 20 MG CR capsule Take 1 capsule (20 mg) by mouth daily 90 capsule 1     Allergies   Allergen Reactions     No Known Drug Allergies      Recent Labs   Lab Test 02/19/18  0834 12/02/16  1005 05/23/16  1156 08/07/15   A1C  --   --   --  5.4   *  --  128* 138   HDL 59  --  54 50   TRIG 74  --  74 105   ALT 22 23 30 15.8   CR 0.62 0.65 0.68 0.59   GFRESTIMATED >90 >90  Non  GFR Calc   89  --    GFRESTBLACK >90 >90   GFR Calc   >90   GFR Calc    --    POTASSIUM 4.4 4.0 4.6  --    TSH 1.54 1.16 1.00  --       BP Readings from Last 3 Encounters:   01/15/19 110/68   11/19/18 110/70   10/03/18 112/71    Wt Readings from Last 3 Encounters:   01/15/19 75.8 kg (167 lb)   11/19/18 76.9 kg (169 lb 9.6 oz)   10/03/18 77.8 kg (171 lb 8 oz)                  Labs reviewed in EPIC    Reviewed and updated as needed this visit by clinical staff       Reviewed and updated as needed this visit by Provider         ROS: has Headache; Insomnia; Generalized osteoarthrosis, unspecified site; Other unspecified back disorder; Low back pain; GERD (gastroesophageal reflux disease); Advanced directives, counseling/discussion; Mixed hyperlipidemia; Hyperlipidemia with target LDL less than 130; Gastroesophageal reflux disease, esophagitis presence not specified; Chronic left-sided low back pain without sciatica; Gastroesophageal reflux disease without esophagitis; Foraminal stenosis of lumbosacral region; Fatigue, unspecified type; Vitamin D deficiency; and Osteopenia of spine on their problem list.  Review Of Systems  Skin: negative  Eyes: negative  Ears/Nose/Throat:  negative  Respiratory: No shortness of breath, dyspnea on exertion, cough, or hemoptysis  Cardiovascular: negative  Gastrointestinal: heartburn and reflux  Genitourinary: negative  Musculoskeletal: back pain, arthritis and joint pain  Neurologic: negative  Psychiatric: negative  Hematologic/Lymphatic/Immunologic: negative  Endocrine: negative        OBJECTIVE:     /68   Pulse 77   Temp 98.8  F (37.1  C) (Tympanic)   Resp 16   Wt 75.8 kg (167 lb)   LMP 09/23/2004   SpO2 99%   BMI 26.95 kg/m    Body mass index is 26.95 kg/m .  GENERAL: healthy, alert and no distress  EYES: Eyes grossly normal to inspection, PERRL and conjunctivae and sclerae normal  HENT: ear canals and TM's normal, nose and mouth without ulcers or lesions  NECK: no adenopathy, no asymmetry, masses, or scars and thyroid normal to palpation  RESP: lungs clear to auscultation - no rales, rhonchi or wheezes  CV: regular rate and rhythm, normal S1 S2, no S3 or S4, no murmur, click or rub, no peripheral edema and peripheral pulses strong  ABDOMEN: soft, nontender, no hepatosplenomegaly, no masses and bowel sounds normal  MS: UPPER EXTREMETIES WITH PAINFUL SHOULDER INTERNAL  AND EXTERNAL ROTATION   TENDER WRISTS AND HANDS   DECREASE RANGE OF MOTION OF BACK   NEGATIVE STRAIGHT LEG RAISING TEST   NORMAL RANGE OF MOTION NECK   PATELLOFEMORAL PAIN BILATERAL KNEE PAIN   FULL RANGE OF MOTION OTHERWISE     Diagnostic Test Results:  Results for orders placed or performed during the hospital encounter of 09/22/18   MR Lumbar Spine w/o Contrast    Narrative    MR LUMBAR SPINE WITHOUT CONTRAST  9/22/2018 8:40 AM     HISTORY:  Foraminal stenosis of lumbosacral region. Low back and left  leg pain.    COMPARISON: 8/9/2017    TECHNIQUE:  Sagittal T1 and STIR. Sagittal T2 and axial dual-echo T2.     FINDINGS:  Five lumbar vertebrae are assumed. Grade 2 spondylolytic  spondylolisthesis at L5-S1 with prominent degenerative disc disease.  Right renal  cystic-appearing lesions, incompletely imaged.     Findings by specific level:    There is minimal multilevel facet degenerative change.    T12-L1: No disc herniation or stenosis.    L1-L2: No disc herniation or stenosis.    L2-L3: No disc herniation or stenosis.    L3-L4: No disc herniation or stenosis.    L4-5: No disc herniation or stenosis.    L5-S1: Disc bulging including foraminal components. Severe bilateral  foraminal stenosis. No central stenosis at the disc level. There is  mild canal medial-lateral narrowing at the level of the pars defects  without significant overall central stenosis.      Impression    IMPRESSION:  L5-S1: Grade 2 spondylolytic spondylolisthesis. Severe  bilateral foraminal stenosis, similar to the previous exam.    CANDY MENESES MD       ASSESSMENT/PLAN:           ICD-10-CM    1. Foraminal stenosis of lumbosacral region M99.83 diclofenac (VOLTAREN) 1 % topical gel   2. Osteopenia of spine M85.88 Glucosamine HCl-MSM (SM GLUCOSAMINE HCL-MSM) 750-750 MG TABS     diclofenac (VOLTAREN) 1 % topical gel   3. Bilateral low back pain without sciatica, unspecified chronicity M54.5 diclofenac (VOLTAREN) 1 % topical gel         Patient Instructions   .  Jose' Flexion Versus [place]   Extension Exercises For   Low Back Pain   Examples of Jose' Flexion Exercises  1. Pelvic tilt.  Please press the small of your back against the floor.  Start with 5-10  and increase to 100 count over one month   2. Single Knee to chest. Lie on your back with legs in bent position. Alternate one leg and the other very slowly bringing the knee to chest.  Start with a count of 5-10   Over one month work up to 100  3. Double knee to chest.  Lie on your back with knees in bent position.  Bring both knees to the chest slowly hold for a count of 5-to 10. Over one month work to 100  4. Partial sit-up or crunch.  Lie on your back in bent leg position.  Please bring your body with arms crossed in front  To 30 degrees of  "flexion. Start with 5-10 over one month work up to 100 or more  5. Sit back.  Please sit on the side of the bed or a stair landing  And lie backwards until the abdominal muscles start to quiver.  Hold for a count of 5-10 and over a month work up to 100.  5. Hamstring stretch.  Please extend your legs while sitting on the floor as tolerated for 5-10 count.  Gradually increase to count of 30 over one month      Alternately find a stair landing or sturdy chair and place heel  In a comfortable level of extension  And stretch one hamstring at a time for 5-10 seconds.  Increase to count of 30 over one month                         Squat.  Stand with legs comfortably apart and lower the body slowly by flexing the knees  for count of 5-10 over one month increase to 30.  Useful for anterior disc protrusion, facette joint arthritis spond-10ylolysis, spondylolisthesis and spinal stenosis   ROTATION AND LATERAL BENDING AS TOLERATED RIGHT OR LEFT     PILLOWS UNDER KNEES AT BED TIME    STRETCHING FORWARDS AND DOWN WHEN SITTING ON CHAIR    MAY SIT IN RELAXED MANNER     WHEN IN PREVENTION PHASE START WITH WITH CCEILIO EXERCISES AND FOLLOW UP  WITH DEON EXERCISES AS TOLERATED     RILEY BETHEA JR., MD     Diabetes: Exchange List  What are the exchange lists?   The exchange lists show you portions of food that equal 1 exchange. Foods are divided into food lists. The foods on each list are called exchanges because they have a similar number of calories, protein, carbohydrate and fat content. Foods from each list can be traded or \"exchanged\" for any other food on the same list because they all have a similar exchange value. A dietitian will help you plan how much food your child should eat at each meal and from what lists the foods should come from. At first you should measure your food until you are able to make good estimates about serving sizes. The following list is a sample of foods found on the exchange lists. For more " information, you can buy the Exchange Lists for Meal Planning from: The American Diabetes Association [address] [place] [address] 1-466.432.7370 http://www.diabetes.org  Carbohydrate group   Starch List: One starch exchange contains about 15 grams of carbohydrate, 3 grams of protein, 0 to 1 grams of fat, and 80 calories. A starch exchange is sometimes called a carb exchange. Examples of one starch (carb) exchange are:   one slice of bread   1/2 hamburger or hot dog bun   3/4 cup of unsweetened cereal   1/3 cup pasta   3 cups popcorn   crackers (6 small saltines, 3 squares of yue crackers, 3 of most other crackers)   1 pancake or waffle (5 inch)   15 to 20 fat-free or baked potato or corn chips.   The vegetables included in the starch exchanges include:   corn (1/2 cup or 1/2 cob)   white potato (1/4 large baked with skin or 1/2 cup mashed)   yam or sweet potato (1/2 cup)   green peas (1/2 cup)   squash, winter (1 cup)   lima beans (2/3 cup).   Fruit List: 1 fruit exchange contains about 15 grams of carbohydrate and 60 calories. Examples of one fruit exchange are:   grape juice (1/3 cup)   apple or pineapple juice (1/2 cup)   orange or grapefruit juice (1/2 cup)   1 small apple   orange or peach   1/2 banana   1 cup raspberries   1/3 of a small cantaloupe   1 slice of watermelon.   Milk List: 1 milk exchange contains about 8 grams of protein and 12 grams of carbohydrate. Items on the milk list are divided into fat-free, reduced fat, and whole milk depending on the number of fat grams in the exchange. Examples of one milk exchange are: Fat-Free (0 to 3 grams of fat)   1 cup of skim or non-fat milk   1 cup of 1% milk (also includes 1/2 fat exchange)   6 ounces flavored fat-free yogurt   Reduced-Fat (5 grams of fat)   6 ounces of plain, low-fat yogurt   1 cup 2% milk (also includes one fat exchange).   Whole Milk (8 grams of fat)   8 ounces of plain yogurt (made from whole milk)   1 cup whole milk.   Vegetable  "List: One vegetable exchange has 5 grams of carbohydrate, 2 grams of protein, no fat, and 25 calories. One-half cup of cooked or a cup of raw vegetables is a good measure for 1 exchange of most vegetables. Raw lettuce may be taken in larger quantities, but salad dressing usually equals 1 fat exchange. Other Carbohydrates List: One \"other carbohydrate\" exchange has 15 grams of carbohydrate. Many of these foods count as a starch exchange and one or more fat exchanges.   brownie (2 inch square) = 1 carb, 1 fat exchange   fruit snack roll = 1 carb exchange   granola bar = 1 and 1/2 carb exchanges   ice cream (1/2 cup) = 1 carb, 2 fat exchanges   frozen yogurt (1/2 cup) = 1 carb, 0 to 1 fat exchanges   tortilla chips (6-12) = 1 carb, 2 fat exchanges.   Meat and Meat Substitute Group   Meats are divided into very lean meats, lean meats, medium-fat meats, and high-fat meats. People with diabetes should try to eat more lean and medium fat meats and stay away from the high fat choices. The Very Lean meat group includes foods that contain 7 grams of protein, 0 to 1 gram of fat, and 35 calories for 1 exchange. Examples include:   1 ounce chicken or turkey (white meat, no skin)   1 ounce fresh fish   1 ounce fat-free cheese   2 egg whites   The Lean meat group includes foods that contain 7 grams of protein, 3 grams of fat, and 55 calories for 1 meat exchange. Examples include:   1 ounce chicken or turkey (dark meat, no skin)   1 ounce fish   1 ounce lean pork   1 ounce USDA Select or Choice grades of lean beef   1 ounce cheese (with 3 grams of fat or less per ounce).   The Medium-Fat group includes foods that have 7 grams of protein, 5 grams of fat, and 75 calories for 1 meat exchange. Examples include:   1 ounce of ground beef, most cuts of beef, pork, lamb or veal   1 ounce of cheese (5 grams of fat per ounce or less)   1 egg   1 ounce fried fish.   The High-Fat foods have 7 grams of protein, 8 grams of fat, and 100 calories " for 1 meat exchange. This group includes:   1 ounce of pork sausage   1 ounce of spare ribs   1 oz of regular cheese (American, Swiss etc.)   1 oz of lunch meat   1 hot dog (turkey or chicken).   Fat Group   Fat List: Fat is necessary for the body and is particularly important during periods of fasting (overnight), when it is very slowly absorbed. 1 fat exchange contains 5 grams of fat and 45 calories. The monounsaturated fats and polyunsaturated fats are better for us than saturated fats. The fat list includes: 1 exchange of monounsaturated fats equals:   1/2 Tbsp peanut butter   6 almonds   1 tsp of oil (olive, peanut, canola).   1 exchange of polyunsaturated fats equals:   1 tsp margarine   1 tsp of any vegetable oil (except coconut).   1 exchange of saturated fat includes:   1 tsp butter   1 strip of seals   2 Tbsp of cream (half and half).   Free Foods   A free food contains less than 20 calories or less than 5 grams of carbohydrate per serving. If the food has a serving size listed on its package, it should be limited to 3 servings spread throughout the day. Examples of free foods include:   4 Tbsp fat-free margarine   1 Tbsp fat-free Miracle Whip   sugar-free gelatin   diet soft drinks   catsup   soy sauce   spices.   Combination Foods   Many foods, such as casseroles, are mixed together. Your dietitian can help you figure out how many exchanges to count for combination foods. For example:   lasagna (1 cup) = 2 carb exchanges and 2 medium-fat meat exchanges   spaghetti with meatballs (1 cup) = 2 carb exchanges and 2 medium-fat meat exchanges   pizza, cheese (1/4 of 12 in.) = 2 carbs, 2 medium-fat meats   chicken noodle soup (1 cup) = 1 carb exchange   frozen entrée (less than 300 calories) = 2 carbs, 3 lean meat exchanges   macaroni and cheese (1 cup) = 2 carb exchanges and 2 medium-fat meat exchanges.   20 EXCHANGES  PER DAY   WATER OR TEA WITHOUT A LOT OF SUGAR   RILEY BETHEA JR., MD   160 ONE MONTH    155 ONE MONTH   150 POUNDS MONTH   WALK IN PLACE 15 MINUTES WITH EACH MEAL      KNEE EXERCISES        ICE TO KNEE 5 MINUTES PRIOR TO EXERCISE IF POSSIBLE    ISOMETRIC KNEE EXTENDED POSITION STANDING X 30 TWICE DAILY \    FLEXION OF KNEE ISOMETRIC 90 DEGREE FLEXION X 30 TWICE DAILY    WITH FIVE POUND WEIGHTS OR PURSE    SITTING EXTENDED KNEE  X 3O SECONDS TWICE DAILY    STANDING WITH KNEE FLEXED X 30 SECONDS TWICE DAILY    CLOSED CHAIN EXERCISE  ,THAT IS ONE 1-2 INCH BOOK 30 REPS ON AND OFF THE BOOK OR PLATFORM     AFTER 2 WEEK INCREASE TO 3 INCHES    AFTER 4 WEEKS INCREASE TO 4 INCHES    WALL SITTING 30 SECONDS 45 DEGREES    AFTER 2 WEEKS 30 SECONDS 60 DEGREES    AFTER  4 WEEKS 30 SECONDS 90 DEGREES    BALANCE 30 SECONDS WITH OPPOSITE LEG AT 30 DEGREES AND ARM TO SIDE X 2 WEEKS    BALANCE 30 SECONDS WITH OPPOSITE LEG AT 60 DEGREES AND ARM TO SIDE X 2 WEEKS    REPEAT with OPPOSITE LEGS BALANCING     CODMAN'S EXERCISES  ,THAT IS PENDULUM RANGE OF MOTION 30 EACH TWICE DAILY    THUMB UP EXERCISES INTO PAIN 30 EACH TWICE DAILY    INTERNAL  AND EXTERNAL ROTATION  with AND WITHOUT RESISTANCE OR WEIGHT 30 EACH TWICE DAILY    SWORD SHEATH EXERCISE 30 EACH TWICE DAILY    WALL STRETCH EXERCISE 30 SECONDS EACH TWICE DAILY    POSTERIOR SHOULDER STRETCH AND HOLD 3 10 SECOND STRETCHES TWICE DAILY    ISOMETRIC EXERCISE 60 DEGREES ABDUCTION, ADDUCTION, 90 DEGREE THUMB UP POSITION    AVOID PAINFUL ARC    ICE TWICE DAILY X 5 MINUTES PRIOR TO EXERCISE OR AS NEEDED FOR PAIN CONTROL        RILEY BETHEA MD  [facility]

## 2019-01-15 NOTE — PATIENT INSTRUCTIONS
.  Jose' Flexion Versus Deon   Extension Exercises For   Low Back Pain   Examples of Jose' Flexion Exercises  1. Pelvic tilt.  Please press the small of your back against the floor.  Start with 5-10  and increase to 100 count over one month   2. Single Knee to chest. Lie on your back with legs in bent position. Alternate one leg and the other very slowly bringing the knee to chest.  Start with a count of 5-10   Over one month work up to 100  3. Double knee to chest.  Lie on your back with knees in bent position.  Bring both knees to the chest slowly hold for a count of 5-to 10. Over one month work to 100  4. Partial sit-up or crunch.  Lie on your back in bent leg position.  Please bring your body with arms crossed in front  To 30 degrees of flexion. Start with 5-10 over one month work up to 100 or more  5. Sit back.  Please sit on the side of the bed or a stair landing  And lie backwards until the abdominal muscles start to quiver.  Hold for a count of 5-10 and over a month work up to 100.  5. Hamstring stretch.  Please extend your legs while sitting on the floor as tolerated for 5-10 count.  Gradually increase to count of 30 over one month      Alternately find a stair landing or sturdy chair and place heel  In a comfortable level of extension  And stretch one hamstring at a time for 5-10 seconds.  Increase to count of 30 over one month                         Squat.  Stand with legs comfortably apart and lower the body slowly by flexing the knees  for count of 5-10 over one month increase to 30.  Useful for anterior disc protrusion, facette joint arthritis spond-10ylolysis, spondylolisthesis and spinal stenosis   ROTATION AND LATERAL BENDING AS TOLERATED RIGHT OR LEFT     PILLOWS UNDER KNEES AT BED TIME    STRETCHING FORWARDS AND DOWN WHEN SITTING ON CHAIR    MAY SIT IN RELAXED MANNER     WHEN IN PREVENTION PHASE START WITH WITH JOSE EXERCISES AND FOLLOW UP  WITH DEON EXERCISES AS TOLERATED  "    RILEY BETHEA JR., MD     Diabetes: Exchange List  What are the exchange lists?   The exchange lists show you portions of food that equal 1 exchange. Foods are divided into food lists. The foods on each list are called exchanges because they have a similar number of calories, protein, carbohydrate and fat content. Foods from each list can be traded or \"exchanged\" for any other food on the same list because they all have a similar exchange value. A dietitian will help you plan how much food your child should eat at each meal and from what lists the foods should come from. At first you should measure your food until you are able to make good estimates about serving sizes. The following list is a sample of foods found on the exchange lists. For more information, you can buy the Exchange Lists for Meal Planning from: The American Diabetes Association [address] [place] [address] 1-705.446.6427 http://www.diabetes.org  Carbohydrate group   Starch List: One starch exchange contains about 15 grams of carbohydrate, 3 grams of protein, 0 to 1 grams of fat, and 80 calories. A starch exchange is sometimes called a carb exchange. Examples of one starch (carb) exchange are:   one slice of bread   1/2 hamburger or hot dog bun   3/4 cup of unsweetened cereal   1/3 cup pasta   3 cups popcorn   crackers (6 small saltines, 3 squares of yue crackers, 3 of most other crackers)   1 pancake or waffle (5 inch)   15 to 20 fat-free or baked potato or corn chips.   The vegetables included in the starch exchanges include:   corn (1/2 cup or 1/2 cob)   white potato (1/4 large baked with skin or 1/2 cup mashed)   yam or sweet potato (1/2 cup)   green peas (1/2 cup)   squash, winter (1 cup)   lima beans (2/3 cup).   Fruit List: 1 fruit exchange contains about 15 grams of carbohydrate and 60 calories. Examples of one fruit exchange are:   grape juice (1/3 cup)   apple or pineapple juice (1/2 cup)   orange or grapefruit juice (1/2 " "cup)   1 small apple   orange or peach   1/2 banana   1 cup raspberries   1/3 of a small cantaloupe   1 slice of watermelon.   Milk List: 1 milk exchange contains about 8 grams of protein and 12 grams of carbohydrate. Items on the milk list are divided into fat-free, reduced fat, and whole milk depending on the number of fat grams in the exchange. Examples of one milk exchange are: Fat-Free (0 to 3 grams of fat)   1 cup of skim or non-fat milk   1 cup of 1% milk (also includes 1/2 fat exchange)   6 ounces flavored fat-free yogurt   Reduced-Fat (5 grams of fat)   6 ounces of plain, low-fat yogurt   1 cup 2% milk (also includes one fat exchange).   Whole Milk (8 grams of fat)   8 ounces of plain yogurt (made from whole milk)   1 cup whole milk.   Vegetable List: One vegetable exchange has 5 grams of carbohydrate, 2 grams of protein, no fat, and 25 calories. One-half cup of cooked or a cup of raw vegetables is a good measure for 1 exchange of most vegetables. Raw lettuce may be taken in larger quantities, but salad dressing usually equals 1 fat exchange. Other Carbohydrates List: One \"other carbohydrate\" exchange has 15 grams of carbohydrate. Many of these foods count as a starch exchange and one or more fat exchanges.   brownie (2 inch square) = 1 carb, 1 fat exchange   fruit snack roll = 1 carb exchange   granola bar = 1 and 1/2 carb exchanges   ice cream (1/2 cup) = 1 carb, 2 fat exchanges   frozen yogurt (1/2 cup) = 1 carb, 0 to 1 fat exchanges   tortilla chips (6-12) = 1 carb, 2 fat exchanges.   Meat and Meat Substitute Group   Meats are divided into very lean meats, lean meats, medium-fat meats, and high-fat meats. People with diabetes should try to eat more lean and medium fat meats and stay away from the high fat choices. The Very Lean meat group includes foods that contain 7 grams of protein, 0 to 1 gram of fat, and 35 calories for 1 exchange. Examples include:   1 ounce chicken or turkey (white meat, no skin) "   1 ounce fresh fish   1 ounce fat-free cheese   2 egg whites   The Lean meat group includes foods that contain 7 grams of protein, 3 grams of fat, and 55 calories for 1 meat exchange. Examples include:   1 ounce chicken or turkey (dark meat, no skin)   1 ounce fish   1 ounce lean pork   1 ounce USDA Select or Choice grades of lean beef   1 ounce cheese (with 3 grams of fat or less per ounce).   The Medium-Fat group includes foods that have 7 grams of protein, 5 grams of fat, and 75 calories for 1 meat exchange. Examples include:   1 ounce of ground beef, most cuts of beef, pork, lamb or veal   1 ounce of cheese (5 grams of fat per ounce or less)   1 egg   1 ounce fried fish.   The High-Fat foods have 7 grams of protein, 8 grams of fat, and 100 calories for 1 meat exchange. This group includes:   1 ounce of pork sausage   1 ounce of spare ribs   1 oz of regular cheese (American, Swiss etc.)   1 oz of lunch meat   1 hot dog (turkey or chicken).   Fat Group   Fat List: Fat is necessary for the body and is particularly important during periods of fasting (overnight), when it is very slowly absorbed. 1 fat exchange contains 5 grams of fat and 45 calories. The monounsaturated fats and polyunsaturated fats are better for us than saturated fats. The fat list includes: 1 exchange of monounsaturated fats equals:   1/2 Tbsp peanut butter   6 almonds   1 tsp of oil (olive, peanut, canola).   1 exchange of polyunsaturated fats equals:   1 tsp margarine   1 tsp of any vegetable oil (except coconut).   1 exchange of saturated fat includes:   1 tsp butter   1 strip of seals   2 Tbsp of cream (half and half).   Free Foods   A free food contains less than 20 calories or less than 5 grams of carbohydrate per serving. If the food has a serving size listed on its package, it should be limited to 3 servings spread throughout the day. Examples of free foods include:   4 Tbsp fat-free margarine   1 Tbsp fat-free Miracle Whip   sugar-free  gelatin   diet soft drinks   catsup   soy sauce   spices.   Combination Foods   Many foods, such as casseroles, are mixed together. Your dietitian can help you figure out how many exchanges to count for combination foods. For example:   lasagna (1 cup) = 2 carb exchanges and 2 medium-fat meat exchanges   spaghetti with meatballs (1 cup) = 2 carb exchanges and 2 medium-fat meat exchanges   pizza, cheese (1/4 of 12 in.) = 2 carbs, 2 medium-fat meats   chicken noodle soup (1 cup) = 1 carb exchange   frozen entrée (less than 300 calories) = 2 carbs, 3 lean meat exchanges   macaroni and cheese (1 cup) = 2 carb exchanges and 2 medium-fat meat exchanges.   20 EXCHANGES  PER DAY   WATER OR TEA WITHOUT A LOT OF SUGAR   RILEY BETHEA JR., MD   160 ONE MONTH   155 ONE MONTH   150 POUNDS MONTH   WALK IN PLACE 15 MINUTES WITH EACH MEAL      KNEE EXERCISES        ICE TO KNEE 5 MINUTES PRIOR TO EXERCISE IF POSSIBLE    ISOMETRIC KNEE EXTENDED POSITION STANDING X 30 TWICE DAILY \    FLEXION OF KNEE ISOMETRIC 90 DEGREE FLEXION X 30 TWICE DAILY    WITH FIVE POUND WEIGHTS OR PURSE    SITTING EXTENDED KNEE  X 3O SECONDS TWICE DAILY    STANDING WITH KNEE FLEXED X 30 SECONDS TWICE DAILY    CLOSED CHAIN EXERCISE  ,THAT IS ONE 1-2 INCH BOOK 30 REPS ON AND OFF THE BOOK OR PLATFORM     AFTER 2 WEEK INCREASE TO 3 INCHES    AFTER 4 WEEKS INCREASE TO 4 INCHES    WALL SITTING 30 SECONDS 45 DEGREES    AFTER 2 WEEKS 30 SECONDS 60 DEGREES    AFTER  4 WEEKS 30 SECONDS 90 DEGREES    BALANCE 30 SECONDS WITH OPPOSITE LEG AT 30 DEGREES AND ARM TO SIDE X 2 WEEKS    BALANCE 30 SECONDS WITH OPPOSITE LEG AT 60 DEGREES AND ARM TO SIDE X 2 WEEKS    REPEAT with OPPOSITE LEGS BALANCING     CODMAN'S EXERCISES  ,THAT IS PENDULUM RANGE OF MOTION 30 EACH TWICE DAILY    THUMB UP EXERCISES INTO PAIN 30 EACH TWICE DAILY    INTERNAL  AND EXTERNAL ROTATION  with AND WITHOUT RESISTANCE OR WEIGHT 30 EACH TWICE DAILY    SWORD SHEATH EXERCISE 30 EACH TWICE DAILY    WALL  STRETCH EXERCISE 30 SECONDS EACH TWICE DAILY    POSTERIOR SHOULDER STRETCH AND HOLD 3 10 SECOND STRETCHES TWICE DAILY    ISOMETRIC EXERCISE 60 DEGREES ABDUCTION, ADDUCTION, 90 DEGREE THUMB UP POSITION    AVOID PAINFUL ARC    ICE TWICE DAILY X 5 MINUTES PRIOR TO EXERCISE OR AS NEEDED FOR PAIN CONTROL        RILEY BETHEA JR., MD

## 2019-01-21 ENCOUNTER — OFFICE VISIT (OUTPATIENT)
Dept: INTERNAL MEDICINE | Facility: CLINIC | Age: 59
End: 2019-01-21
Payer: COMMERCIAL

## 2019-01-21 VITALS
HEIGHT: 66 IN | WEIGHT: 165.2 LBS | HEART RATE: 102 BPM | RESPIRATION RATE: 18 BRPM | DIASTOLIC BLOOD PRESSURE: 69 MMHG | OXYGEN SATURATION: 100 % | TEMPERATURE: 98.5 F | BODY MASS INDEX: 26.55 KG/M2 | SYSTOLIC BLOOD PRESSURE: 112 MMHG

## 2019-01-21 DIAGNOSIS — K21.9 GASTROESOPHAGEAL REFLUX DISEASE WITHOUT ESOPHAGITIS: ICD-10-CM

## 2019-01-21 DIAGNOSIS — M75.82 TENDINITIS OF LEFT ROTATOR CUFF: Primary | ICD-10-CM

## 2019-01-21 DIAGNOSIS — Z23 NEED FOR PROPHYLACTIC VACCINATION AND INOCULATION AGAINST INFLUENZA: ICD-10-CM

## 2019-01-21 PROCEDURE — 90682 RIV4 VACC RECOMBINANT DNA IM: CPT | Performed by: INTERNAL MEDICINE

## 2019-01-21 PROCEDURE — 99214 OFFICE O/P EST MOD 30 MIN: CPT | Mod: 25 | Performed by: INTERNAL MEDICINE

## 2019-01-21 PROCEDURE — 90471 IMMUNIZATION ADMIN: CPT | Performed by: INTERNAL MEDICINE

## 2019-01-21 ASSESSMENT — MIFFLIN-ST. JEOR: SCORE: 1336.09

## 2019-01-21 ASSESSMENT — PAIN SCALES - GENERAL: PAINLEVEL: EXTREME PAIN (8)

## 2019-01-21 NOTE — PROGRESS NOTES
ASSESSMENT/PLAN:       1. Tendinitis of left rotator cuff    Based on exam, patient likely has rotator cuff tendinitis  Have recommended PT for her  In addition, she can use voltaren gel already prescribed for her by another physician to the area    - PHYSICAL THERAPY REFERRAL; Future    2. Gastroesophageal reflux disease without esophagitis  Patient requesting zantac instead of prilosec  - ranitidine (ZANTAC) 150 MG tablet; Take 1 tablet (150 mg) by mouth 2 times daily  Dispense: 180 tablet; Refill: 3    3. Need for prophylactic vaccination and inoculation against influenza  Flu shot given today  - FLU VACCINE, (RIV4) RECOMBINANT HA  , IM (FluBlok, egg free) [26336]- >18 YRS ([facility] recommended  50-64 YRS)    Patient will come for preop ahead of her Lumbar surgery which she would like to schedule spring/summer.     Health Maintenance  Flu shot given today  PAP declined  Mammogram declined    Ankur Mai MD  [facility]      SUBJECTIVE:   Colette Patricia is a 58 year old female who presents to clinic today for the following health issues:    She has been having pain in her left shoulder and left ear for the past two weeks.    Patient saw Dr. Head from orthopedics on 1/11/19.    He recommended to anterior/posterior L5-S1 with pelvic fixation, and bilateral L5-S1 decompression    For the GERD, she would like zantac instead of prilosec      Problem list and histories reviewed & adjusted, as indicated.  Additional history: as documented    Patient Active Problem List   Diagnosis     Headache     Insomnia     Generalized osteoarthrosis, unspecified site     Other unspecified back disorder     Low back pain     GERD (gastroesophageal reflux disease)     Advanced directives, counseling/discussion     Mixed hyperlipidemia     Hyperlipidemia with target LDL less than 130     Gastroesophageal reflux disease, esophagitis presence not specified     Chronic left-sided low back pain without sciatica      "Gastroesophageal reflux disease without esophagitis     Foraminal stenosis of lumbosacral region     Fatigue, unspecified type     Vitamin D deficiency     Osteopenia of spine     History reviewed. No pertinent surgical history.    Social History     Tobacco Use     Smoking status: Never Smoker     Smokeless tobacco: Never Used   Substance Use Topics     Alcohol use: No     Family History   Problem Relation Age of Onset     Family History Negative Mother      Family History Negative Father      Glaucoma No family hx of      Macular Degeneration No family hx of            Reviewed and updated as needed this visit by clinical staff  Tobacco  Allergies  Meds  Med Hx  Surg Hx  Fam Hx  Soc Hx      Reviewed and updated as needed this visit by Provider         ROS:  Constitutional, HEENT, cardiovascular, pulmonary, gi and gu systems are negative, except as otherwise noted.    OBJECTIVE:     /69 (BP Location: Right arm, Patient Position: Sitting, Cuff Size: Adult Regular)   Pulse 102   Temp 98.5  F (36.9  C) (Oral)   Resp 18   Ht 1.676 m (5' 6\")   Wt 74.9 kg (165 lb 3.2 oz)   LMP 09/23/2004   SpO2 100%   BMI 26.66 kg/m    Body mass index is 26.66 kg/m .  GENERAL: healthy, alert and no distress  HENT: ear canals and TM's normal, nose and mouth without ulcers or lesions  NECK: no adenopathy, no asymmetry, masses, or scars and thyroid normal to palpation  MS: no gross musculoskeletal defects noted, no edema  Full ROM of left shoulder  Pain and tenderness with subscapularis movement    Diagnostic Test Results:  none       "

## 2019-01-21 NOTE — PROGRESS NOTES
Injectable Influenza Immunization Documentation    1.  Is the person to be vaccinated sick today?   No    2. Does the person to be vaccinated have an allergy to a component   of the vaccine?   No  Egg Allergy Algorithm Link    3. Has the person to be vaccinated ever had a serious reaction   to influenza vaccine in the past?   No    4. Has the person to be vaccinated ever had Guillain-Barré syndrome?   No    Form completed by Jes Mcpherson    Prior to injection, verified patient identity using patient's name and date of birth.  Due to injection administration, patient instructed to remain in clinic for 15 minutes  afterwards, and to report any adverse reaction to me immediately.

## 2019-01-24 ENCOUNTER — TELEPHONE (OUTPATIENT)
Dept: FAMILY MEDICINE | Facility: CLINIC | Age: 59
End: 2019-01-24

## 2019-01-24 DIAGNOSIS — M48.07 FORAMINAL STENOSIS OF LUMBOSACRAL REGION: ICD-10-CM

## 2019-01-24 DIAGNOSIS — M54.50 BILATERAL LOW BACK PAIN WITHOUT SCIATICA, UNSPECIFIED CHRONICITY: ICD-10-CM

## 2019-01-24 DIAGNOSIS — M85.88 OSTEOPENIA OF SPINE: ICD-10-CM

## 2019-01-24 NOTE — TELEPHONE ENCOUNTER
Received fax from [facility] stating they did not receive Rx for Voltaren.      I tried to do a verbal but pharmacy is asking how much should they apply to knees and low back. Routing to provider to clarify.

## 2019-06-06 ENCOUNTER — OFFICE VISIT (OUTPATIENT)
Dept: INTERNAL MEDICINE | Facility: CLINIC | Age: 59
End: 2019-06-06
Payer: COMMERCIAL

## 2019-06-06 VITALS
TEMPERATURE: 98.6 F | RESPIRATION RATE: 16 BRPM | HEART RATE: 71 BPM | BODY MASS INDEX: 25.23 KG/M2 | SYSTOLIC BLOOD PRESSURE: 104 MMHG | DIASTOLIC BLOOD PRESSURE: 60 MMHG | HEIGHT: 66 IN | WEIGHT: 157 LBS | OXYGEN SATURATION: 98 %

## 2019-06-06 DIAGNOSIS — E78.2 MIXED HYPERLIPIDEMIA: ICD-10-CM

## 2019-06-06 DIAGNOSIS — E55.9 VITAMIN D DEFICIENCY: Primary | ICD-10-CM

## 2019-06-06 DIAGNOSIS — M54.50 BILATERAL LOW BACK PAIN WITHOUT SCIATICA, UNSPECIFIED CHRONICITY: ICD-10-CM

## 2019-06-06 DIAGNOSIS — K21.9 GASTROESOPHAGEAL REFLUX DISEASE WITHOUT ESOPHAGITIS: ICD-10-CM

## 2019-06-06 DIAGNOSIS — M85.88 OSTEOPENIA OF SPINE: ICD-10-CM

## 2019-06-06 DIAGNOSIS — M48.07 FORAMINAL STENOSIS OF LUMBOSACRAL REGION: ICD-10-CM

## 2019-06-06 LAB
BASOPHILS # BLD AUTO: 0 10E9/L (ref 0–0.2)
BASOPHILS NFR BLD AUTO: 0.2 %
DIFFERENTIAL METHOD BLD: ABNORMAL
EOSINOPHIL # BLD AUTO: 0.2 10E9/L (ref 0–0.7)
EOSINOPHIL NFR BLD AUTO: 3.8 %
ERYTHROCYTE [DISTWIDTH] IN BLOOD BY AUTOMATED COUNT: 19.1 % (ref 10–15)
HCT VFR BLD AUTO: 28.6 % (ref 35–47)
HGB BLD-MCNC: 8.5 G/DL (ref 11.7–15.7)
LYMPHOCYTES # BLD AUTO: 1.8 10E9/L (ref 0.8–5.3)
LYMPHOCYTES NFR BLD AUTO: 36.7 %
MCH RBC QN AUTO: 22.6 PG (ref 26.5–33)
MCHC RBC AUTO-ENTMCNC: 29.7 G/DL (ref 31.5–36.5)
MCV RBC AUTO: 76 FL (ref 78–100)
MONOCYTES # BLD AUTO: 0.5 10E9/L (ref 0–1.3)
MONOCYTES NFR BLD AUTO: 11.1 %
NEUTROPHILS # BLD AUTO: 2.3 10E9/L (ref 1.6–8.3)
NEUTROPHILS NFR BLD AUTO: 48.2 %
PLATELET # BLD AUTO: 273 10E9/L (ref 150–450)
RBC # BLD AUTO: 3.76 10E12/L (ref 3.8–5.2)
WBC # BLD AUTO: 4.8 10E9/L (ref 4–11)

## 2019-06-06 PROCEDURE — 84443 ASSAY THYROID STIM HORMONE: CPT | Performed by: NURSE PRACTITIONER

## 2019-06-06 PROCEDURE — 80061 LIPID PANEL: CPT | Performed by: NURSE PRACTITIONER

## 2019-06-06 PROCEDURE — 99214 OFFICE O/P EST MOD 30 MIN: CPT | Performed by: NURSE PRACTITIONER

## 2019-06-06 PROCEDURE — 85025 COMPLETE CBC W/AUTO DIFF WBC: CPT | Performed by: NURSE PRACTITIONER

## 2019-06-06 PROCEDURE — 80053 COMPREHEN METABOLIC PANEL: CPT | Performed by: NURSE PRACTITIONER

## 2019-06-06 PROCEDURE — 82306 VITAMIN D 25 HYDROXY: CPT | Performed by: NURSE PRACTITIONER

## 2019-06-06 PROCEDURE — 36415 COLL VENOUS BLD VENIPUNCTURE: CPT | Performed by: NURSE PRACTITIONER

## 2019-06-06 RX ORDER — ATORVASTATIN CALCIUM 20 MG/1
20 TABLET, FILM COATED ORAL DAILY
Qty: 90 TABLET | Refills: 3 | Status: SHIPPED | OUTPATIENT
Start: 2019-06-06 | End: 2021-07-02

## 2019-06-06 ASSESSMENT — MIFFLIN-ST. JEOR: SCORE: 1293.9

## 2019-06-06 NOTE — NURSING NOTE
"Chief Complaint   Patient presents with     Medication Request     pt needs refills and also needs records for a spinal Dr. she is seeing.     initial /60   Pulse 71   Temp 98.6  F (37  C) (Oral)   Resp 16   Ht 1.676 m (5' 6\")   Wt 71.2 kg (157 lb)   LMP 09/23/2004   SpO2 98%   BMI 25.34 kg/m   Estimated body mass index is 25.34 kg/m  as calculated from the following:    Height as of this encounter: 1.676 m (5' 6\").    Weight as of this encounter: 71.2 kg (157 lb)..  bp completed using cuff size velia BISHOP LPN  "

## 2019-06-06 NOTE — PROGRESS NOTES
".Subjective     Ardo Elisabeth Patricia is a 59 year old female who presents to clinic today for the following health issues:  Chief Complaint   Patient presents with     Medication Request          HPI   pt needs refills and also needs records for a spinal Dr. she is seeing.    Planning to have spine surgery -\"broken disc\" repair   LBP with left leg pain   L5-S1: Grade 2 spondylolytic spondylolisthesis. Severe  bilateral foraminal stenosis, similar to the previous exam    Dr Alek Turcios MD  [phone] scheduling   [phone] fax   [place] [phone]    Was taking zantac 150 mg once daily and not helping much   Not taking twice daily   Will try this     Not taking lipitor - has been off medication for a while   Lab today as she is fasting         Patient Active Problem List   Diagnosis     Headache     Insomnia     Generalized osteoarthrosis, unspecified site     Other unspecified back disorder     Low back pain     GERD (gastroesophageal reflux disease)     Advanced directives, counseling/discussion     Mixed hyperlipidemia     Hyperlipidemia with target LDL less than 130     Gastroesophageal reflux disease, esophagitis presence not specified     Chronic left-sided low back pain without sciatica     Gastroesophageal reflux disease without esophagitis     Foraminal stenosis of lumbosacral region     Fatigue, unspecified type     Vitamin D deficiency     Osteopenia of spine     No past surgical history on file.    Social History     Tobacco Use     Smoking status: Never Smoker     Smokeless tobacco: Never Used   Substance Use Topics     Alcohol use: No     Family History   Problem Relation Age of Onset     Family History Negative Mother      Family History Negative Father      Glaucoma No family hx of      Macular Degeneration No family hx of              Reviewed and updated as needed this visit by Provider         Review of Systems   ROS COMP: Constitutional, HEENT, cardiovascular, pulmonary, gi and gu systems are " "negative, except as otherwise noted.      Objective    /60   Pulse 71   Temp 98.6  F (37  C) (Oral)   Resp 16   Ht 1.676 m (5' 6\")   Wt 71.2 kg (157 lb)   LMP 09/23/2004   SpO2 98%   BMI 25.34 kg/m    Body mass index is 25.34 kg/m .  Physical Exam   GENERAL: alert and no distress- here with    RESP: lungs clear to auscultation - no rales, rhonchi or wheezes  CV: regular rate and rhythm  ABDOMEN: soft, nontender,  and bowel sounds normal  PSYCH: mentation appears normal, affect normal/bright    Diagnostic Test Results:  Labs reviewed in Epic  Reviewed records   Lab         Assessment & Plan     1. Gastroesophageal reflux disease without esophagitis    - ranitidine (ZANTAC) 150 MG tablet; Take 1 tablet (150 mg) by mouth 2 times daily  Dispense: 180 tablet; Refill: 3    2. Mixed hyperlipidemia    - atorvastatin (LIPITOR) 20 MG tablet; Take 1 tablet (20 mg) by mouth daily  Dispense: 90 tablet; Refill: 3  - Lipid panel reflex to direct LDL Fasting  - Comprehensive metabolic panel  - TSH with free T4 reflex  - CBC with platelets and differential    3. Foraminal stenosis of lumbosacral region    - diclofenac (VOLTAREN) 1 % topical gel; Apply 2 g topically 4 times daily FOUR TIMES DAILY  KNEES   LOWERBACK  Dispense: 200 g; Refill: 12    4. Osteopenia of spine    - diclofenac (VOLTAREN) 1 % topical gel; Apply 2 g topically 4 times daily FOUR TIMES DAILY  KNEES   LOWERBACK  Dispense: 200 g; Refill: 12    5. Bilateral low back pain without sciatica, unspecified chronicity  Needs to see surgeon   - diclofenac (VOLTAREN) 1 % topical gel; Apply 2 g topically 4 times daily FOUR TIMES DAILY  KNEES   LOWERBACK  Dispense: 200 g; Refill: 12    6. Vitamin D deficiency    - Vitamin D Deficiency     BMI:   Estimated body mass index is 25.34 kg/m  as calculated from the following:    Height as of this encounter: 1.676 m (5' 6\").    Weight as of this encounter: 71.2 kg (157 lb).           Patient Instructions   Lab " in [address]    Medication refill       No follow-ups on file.    TRAVIS Boyd [facility]

## 2019-06-07 DIAGNOSIS — D50.9 IRON DEFICIENCY ANEMIA, UNSPECIFIED IRON DEFICIENCY ANEMIA TYPE: Primary | ICD-10-CM

## 2019-06-07 LAB
ALBUMIN SERPL-MCNC: 3.6 G/DL (ref 3.4–5)
ALP SERPL-CCNC: 59 U/L (ref 40–150)
ALT SERPL W P-5'-P-CCNC: 15 U/L (ref 0–50)
ANION GAP SERPL CALCULATED.3IONS-SCNC: 5 MMOL/L (ref 3–14)
AST SERPL W P-5'-P-CCNC: 18 U/L (ref 0–45)
BILIRUB SERPL-MCNC: 0.3 MG/DL (ref 0.2–1.3)
BUN SERPL-MCNC: 10 MG/DL (ref 7–30)
CALCIUM SERPL-MCNC: 8.4 MG/DL (ref 8.5–10.1)
CHLORIDE SERPL-SCNC: 112 MMOL/L (ref 94–109)
CHOLEST SERPL-MCNC: 203 MG/DL
CO2 SERPL-SCNC: 25 MMOL/L (ref 20–32)
CREAT SERPL-MCNC: 0.74 MG/DL (ref 0.52–1.04)
DEPRECATED CALCIDIOL+CALCIFEROL SERPL-MC: 35 UG/L (ref 20–75)
GFR SERPL CREATININE-BSD FRML MDRD: 88 ML/MIN/{1.73_M2}
GLUCOSE SERPL-MCNC: 89 MG/DL (ref 70–99)
HDLC SERPL-MCNC: 53 MG/DL
LDLC SERPL CALC-MCNC: 134 MG/DL
NONHDLC SERPL-MCNC: 150 MG/DL
POTASSIUM SERPL-SCNC: 4.6 MMOL/L (ref 3.4–5.3)
PROT SERPL-MCNC: 7.5 G/DL (ref 6.8–8.8)
SODIUM SERPL-SCNC: 142 MMOL/L (ref 133–144)
TRIGL SERPL-MCNC: 81 MG/DL
TSH SERPL DL<=0.005 MIU/L-ACNC: 0.73 MU/L (ref 0.4–4)

## 2019-06-07 RX ORDER — FERROUS GLUCONATE 324(38)MG
324 TABLET ORAL 2 TIMES DAILY WITH MEALS
Qty: 60 TABLET | Refills: 6 | Status: SHIPPED | OUTPATIENT
Start: 2019-06-07 | End: 2020-02-07

## 2019-09-18 ENCOUNTER — TELEPHONE (OUTPATIENT)
Dept: INTERNAL MEDICINE | Facility: CLINIC | Age: 59
End: 2019-09-18

## 2019-09-18 NOTE — TELEPHONE ENCOUNTER
Panel Management Review      Patient has the following on her problem list:       IVD   ASA:     Last LDL:    Lab Results   Component Value Date    CHOL 203 06/06/2019     Lab Results   Component Value Date    HDL 53 06/06/2019     Lab Results   Component Value Date     06/06/2019     Lab Results   Component Value Date    TRIG 81 06/06/2019        Lab Results   Component Value Date    CHOLHDLRATIO 3.6 10/29/2014        Is the patient on a Statin? YES   Is the patient on Aspirin? NO                  Medications     HMG CoA Reductase Inhibitors     atorvastatin (LIPITOR) 20 MG tablet             Last three blood pressure readings:  BP Readings from Last 3 Encounters:   06/06/19 104/60   01/21/19 112/69   01/15/19 110/68        Tobacco History:     History   Smoking Status     Never Smoker   Smokeless Tobacco     Never Used         Composite cancer screening  Chart review shows that this patient is due/due soon for the following Pap Smear  Summary:    Patient is due/failing the following:   PAP and PHYSICAL    Action needed:   Patient needs office visit for see above.    Type of outreach:    Sent letter. unable to reach pt by phone.    Questions for provider review:    None                                                                                                                                    .BRIANNE BISHOP LPN       Chart routed to none .

## 2019-09-18 NOTE — LETTER
[facility]  [address], [address]  [place], [place]  [address]                                            TEL:[phone]  FAX:[phone]      Colette Patricia  [address]   [place] [address]      September 18, 2019    Dear Colette,      At [facility], we care about your health and well-being. A review of your chart has indicated that you are due for a physical and pap smear. Please contact us at [phone] to schedule an appointment.     If you have already had one or all of the above screening tests at another facility, please call us to update your chart.        Sincerely,      ADRIAN Crain

## 2019-09-18 NOTE — LETTER
[facility]  [address], [address]  [place], [place]  [address]                                            TEL:[phone]  FAX:[phone]      Colette Patricia  [address]   [place] [address]      October 25, 2019    Dear Colette,      At [facility], we care about your health and well-being. A review of your chart has indicated that you are due for a pap smear and physical. Please contact us at [phone] to schedule an appointment.     If you have already had one or all of the above screening tests at another facility, please call us to update your chart.        Sincerely,      ADRIAN Crain

## 2020-02-06 ENCOUNTER — OFFICE VISIT (OUTPATIENT)
Dept: INTERNAL MEDICINE | Facility: CLINIC | Age: 60
End: 2020-02-06
Payer: COMMERCIAL

## 2020-02-06 VITALS
HEART RATE: 65 BPM | DIASTOLIC BLOOD PRESSURE: 82 MMHG | HEIGHT: 66 IN | OXYGEN SATURATION: 98 % | RESPIRATION RATE: 16 BRPM | TEMPERATURE: 97.9 F | WEIGHT: 162 LBS | BODY MASS INDEX: 26.03 KG/M2 | SYSTOLIC BLOOD PRESSURE: 126 MMHG

## 2020-02-06 DIAGNOSIS — K59.09 OTHER CONSTIPATION: ICD-10-CM

## 2020-02-06 DIAGNOSIS — K21.00 GASTROESOPHAGEAL REFLUX DISEASE WITH ESOPHAGITIS: ICD-10-CM

## 2020-02-06 DIAGNOSIS — E78.5 HYPERLIPIDEMIA WITH TARGET LDL LESS THAN 130: ICD-10-CM

## 2020-02-06 DIAGNOSIS — Z78.0 POSTMENOPAUSAL STATUS: ICD-10-CM

## 2020-02-06 DIAGNOSIS — Z23 NEED FOR VACCINATION: ICD-10-CM

## 2020-02-06 DIAGNOSIS — D50.8 OTHER IRON DEFICIENCY ANEMIA: ICD-10-CM

## 2020-02-06 DIAGNOSIS — R53.83 FATIGUE, UNSPECIFIED TYPE: ICD-10-CM

## 2020-02-06 DIAGNOSIS — E55.9 VITAMIN D DEFICIENCY: ICD-10-CM

## 2020-02-06 DIAGNOSIS — Z11.4 ENCOUNTER FOR SCREENING FOR HIV: ICD-10-CM

## 2020-02-06 DIAGNOSIS — Z00.01 ENCOUNTER FOR GENERAL ADULT MEDICAL EXAMINATION WITH ABNORMAL FINDINGS: Primary | ICD-10-CM

## 2020-02-06 DIAGNOSIS — Z12.31 ENCOUNTER FOR SCREENING MAMMOGRAM FOR BREAST CANCER: ICD-10-CM

## 2020-02-06 DIAGNOSIS — Z23 NEED FOR PROPHYLACTIC VACCINATION AND INOCULATION AGAINST INFLUENZA: ICD-10-CM

## 2020-02-06 DIAGNOSIS — E78.2 MIXED HYPERLIPIDEMIA: ICD-10-CM

## 2020-02-06 LAB
BASOPHILS # BLD AUTO: 0 10E9/L (ref 0–0.2)
BASOPHILS NFR BLD AUTO: 0.4 %
DIFFERENTIAL METHOD BLD: ABNORMAL
EOSINOPHIL # BLD AUTO: 0.3 10E9/L (ref 0–0.7)
EOSINOPHIL NFR BLD AUTO: 3.9 %
ERYTHROCYTE [DISTWIDTH] IN BLOOD BY AUTOMATED COUNT: 13.3 % (ref 10–15)
HCT VFR BLD AUTO: 35.7 % (ref 35–47)
HGB BLD-MCNC: 11.2 G/DL (ref 11.7–15.7)
LYMPHOCYTES # BLD AUTO: 3.1 10E9/L (ref 0.8–5.3)
LYMPHOCYTES NFR BLD AUTO: 46.2 %
MCH RBC QN AUTO: 28.4 PG (ref 26.5–33)
MCHC RBC AUTO-ENTMCNC: 31.4 G/DL (ref 31.5–36.5)
MCV RBC AUTO: 90 FL (ref 78–100)
MONOCYTES # BLD AUTO: 0.7 10E9/L (ref 0–1.3)
MONOCYTES NFR BLD AUTO: 10.2 %
NEUTROPHILS # BLD AUTO: 2.7 10E9/L (ref 1.6–8.3)
NEUTROPHILS NFR BLD AUTO: 39.3 %
PLATELET # BLD AUTO: 268 10E9/L (ref 150–450)
RBC # BLD AUTO: 3.95 10E12/L (ref 3.8–5.2)
VIT B12 SERPL-MCNC: 587 PG/ML (ref 193–986)
WBC # BLD AUTO: 6.8 10E9/L (ref 4–11)

## 2020-02-06 PROCEDURE — 90472 IMMUNIZATION ADMIN EACH ADD: CPT | Performed by: NURSE PRACTITIONER

## 2020-02-06 PROCEDURE — 99396 PREV VISIT EST AGE 40-64: CPT | Mod: 25 | Performed by: NURSE PRACTITIONER

## 2020-02-06 PROCEDURE — 90682 RIV4 VACC RECOMBINANT DNA IM: CPT | Performed by: NURSE PRACTITIONER

## 2020-02-06 PROCEDURE — 80061 LIPID PANEL: CPT | Performed by: NURSE PRACTITIONER

## 2020-02-06 PROCEDURE — 90471 IMMUNIZATION ADMIN: CPT | Performed by: NURSE PRACTITIONER

## 2020-02-06 PROCEDURE — 82728 ASSAY OF FERRITIN: CPT | Performed by: NURSE PRACTITIONER

## 2020-02-06 PROCEDURE — 82607 VITAMIN B-12: CPT | Performed by: NURSE PRACTITIONER

## 2020-02-06 PROCEDURE — 83540 ASSAY OF IRON: CPT | Performed by: NURSE PRACTITIONER

## 2020-02-06 PROCEDURE — 80053 COMPREHEN METABOLIC PANEL: CPT | Performed by: NURSE PRACTITIONER

## 2020-02-06 PROCEDURE — 83550 IRON BINDING TEST: CPT | Performed by: NURSE PRACTITIONER

## 2020-02-06 PROCEDURE — 84443 ASSAY THYROID STIM HORMONE: CPT | Performed by: NURSE PRACTITIONER

## 2020-02-06 PROCEDURE — 82306 VITAMIN D 25 HYDROXY: CPT | Performed by: NURSE PRACTITIONER

## 2020-02-06 PROCEDURE — 36415 COLL VENOUS BLD VENIPUNCTURE: CPT | Performed by: NURSE PRACTITIONER

## 2020-02-06 PROCEDURE — 85025 COMPLETE CBC W/AUTO DIFF WBC: CPT | Performed by: NURSE PRACTITIONER

## 2020-02-06 PROCEDURE — 87389 HIV-1 AG W/HIV-1&-2 AB AG IA: CPT | Performed by: NURSE PRACTITIONER

## 2020-02-06 PROCEDURE — 90715 TDAP VACCINE 7 YRS/> IM: CPT | Performed by: NURSE PRACTITIONER

## 2020-02-06 RX ORDER — PANTOPRAZOLE SODIUM 20 MG/1
40 TABLET, DELAYED RELEASE ORAL DAILY
Qty: 180 TABLET | Refills: 3 | Status: SHIPPED | OUTPATIENT
Start: 2020-02-06 | End: 2021-07-02

## 2020-02-06 RX ORDER — POLYETHYLENE GLYCOL 3350 17 G/17G
1 POWDER, FOR SOLUTION ORAL DAILY
Qty: 100 PACKET | Refills: 3 | Status: SHIPPED | OUTPATIENT
Start: 2020-02-06 | End: 2021-07-02

## 2020-02-06 ASSESSMENT — ACTIVITIES OF DAILY LIVING (ADL): CURRENT_FUNCTION: NO ASSISTANCE NEEDED

## 2020-02-06 ASSESSMENT — MIFFLIN-ST. JEOR: SCORE: 1316.58

## 2020-02-06 NOTE — NURSING NOTE
.Prior to immunization administration, verified patients identity using patient s name and date of birth. Please see Immunization Activity for additional information.     Screening Questionnaire for Adult Immunization    Are you sick today?   No   Do you have allergies to medications, food, a vaccine component or latex?   No   Have you ever had a serious reaction after receiving a vaccination?   No   Do you have a long-term health problem with heart, lung, kidney, or metabolic disease (e.g., diabetes), asthma, a blood disorder, no spleen, complement component deficiency, a cochlear implant, or a spinal fluid leak?  Are you on long-term aspirin therapy?   No   Do you have cancer, leukemia, HIV/AIDS, or any other immune system problem?   No   Do you have a parent, brother, or sister with an immune system problem?   No   In the past 3 months, have you taken medications that affect  your immune system, such as prednisone, other steroids, or anticancer drugs; drugs for the treatment of rheumatoid arthritis, Crohn s disease, or psoriasis; or have you had radiation treatments?   No   Have you had a seizure, or a brain or other nervous system problem?   No   During the past year, have you received a transfusion of blood or blood    products, or been given immune (gamma) globulin or antiviral drug?   No   For women: Are you pregnant or is there a chance you could become       pregnant during the next month?   No   Have you received any vaccinations in the past 4 weeks?   No     Immunization questionnaire answers were all negative.        Per orders of Dr. Marcelina Person, injection of TDAP   given by Misty Solomon LPN. Patient instructed to remain in clinic for 15 minutes afterwards, and to report any adverse reaction to me immediately.       Screening performed by Misty Solomon LPN on 2/6/2020 at 11:48 AM.

## 2020-02-06 NOTE — NURSING NOTE
"Chief Complaint   Patient presents with     Physical     fasting     initial /82   Pulse 65   Temp 97.9  F (36.6  C) (Oral)   Resp 16   Ht 1.676 m (5' 6\")   Wt 73.5 kg (162 lb)   LMP 09/23/2004   SpO2 98%   BMI 26.15 kg/m   Estimated body mass index is 26.15 kg/m  as calculated from the following:    Height as of this encounter: 1.676 m (5' 6\").    Weight as of this encounter: 73.5 kg (162 lb)..  bp completed using cuff size large  BRIANNE BISHOP LPN  "

## 2020-02-06 NOTE — PATIENT INSTRUCTIONS
Lab in [address]    We will fill medication if needed after lab is back     Protonix 20 mg twice daily      Miralax daily unless loose stool

## 2020-02-06 NOTE — PROGRESS NOTES
SUBJECTIVE:   CC: Colette Patricia is an 59 year old woman who presents for preventive health visit.     Healthy Habits:    In general, how would you rate your overall health?  Good    Frequency of exercise:  None    Duration of exercise:  Less than 15 minutes    Taking medications regularly:: out of meds.    Barriers to taking medications:  Other    Medication side effects:  Not applicable    Ability to successfully perform activities of daily living:  No assistance needed    Home Safety:  No safety concerns identified    Hearing Impairment:  No hearing concerns    In the past 6 months, have you been bothered by leaking of urine?  No    In general, how would you rate your overall mental or emotional health?  Very good      PHQ-2 Total Score:    Additional concerns today:  No        Today's PHQ-2 Score:   PHQ-2 ( 1999 Pfizer) 1/21/2019   Q1: Little interest or pleasure in doing things 0   Q2: Feeling down, depressed or hopeless 0   PHQ-2 Score 0       Abuse: Current or Past(Physical, Sexual or Emotional)- No  Do you feel safe in your environment? Yes        Social History     Tobacco Use     Smoking status: Never Smoker     Smokeless tobacco: Never Used   Substance Use Topics     Alcohol use: No         Alcohol Use 5/23/2016   Prescreen: >3 drinks/day or >7 drinks/week? The patient does not drink >3 drinks per day nor >7 drinks per week.       Reviewed orders with patient.  Reviewed health maintenance and updated orders accordingly - Yes          Pertinent mammograms are reviewed under the imaging tab.  History of abnormal Pap smear:   PAP / HPV 7/28/2008   PAP NIL     Reviewed and updated as needed this visit by clinical staff  Tobacco  Allergies  Meds  Problems  Med Hx  Surg Hx  Fam Hx         Reviewed and updated as needed this visit by Provider  Tobacco  Allergies  Meds  Problems  Med Hx  Surg Hx  Fam Hx            Review of Systems  CONSTITUTIONAL: NEGATIVE for fever, chills, change in  "weight  INTEGUMENTARY/SKIN: NEGATIVE for worrisome rashes, moles or lesions  EYES: NEGATIVE for vision changes or irritation  ENT: NEGATIVE for ear, mouth and throat problems  RESP: NEGATIVE for significant cough or SOB  BREAST: NEGATIVE for masses, tenderness or discharge  CV: NEGATIVE for chest pain, palpitations or peripheral edema  GI: NEGATIVE for nausea, abdominal pain, heartburn, or change in bowel habits  : NEGATIVE for unusual urinary or vaginal symptoms. No vaginal bleeding.  MUSCULOSKELETAL: NEGATIVE for significant arthralgias or myalgia  NEURO: NEGATIVE for weakness, dizziness or paresthesias  PSYCHIATRIC: NEGATIVE for changes in mood or affect     She has not been taking her medication for a month   Will check lab and order medication if needed     Fasting for lab     Declines PAP        OBJECTIVE:   /82   Pulse 65   Temp 97.9  F (36.6  C) (Oral)   Resp 16   Ht 1.676 m (5' 6\")   Wt 73.5 kg (162 lb)   LMP 09/23/2004   SpO2 98%   BMI 26.15 kg/m    Physical Exam  GENERAL APPEARANCE: healthy, alert and no distress  EYES: Eyes grossly normal to inspection, PERRL and conjunctivae and sclerae normal  HENT: ear canals and TM's normal, nose and mouth without ulcers or lesions, oropharynx clear and oral mucous membranes moist  NECK: no adenopathy, no asymmetry, masses, or scars and thyroid normal to palpation  RESP: lungs clear to auscultation - no rales, rhonchi or wheezes  BREAST: normal without masses, tenderness or nipple discharge and no palpable axillary masses or adenopathy  CV: regular rate and rhythm, normal S1 S2, no S3 or S4, no murmur, click or rub, no peripheral edema and peripheral pulses strong  ABDOMEN: soft, nontender, no hepatosplenomegaly, no masses and bowel sounds normal   (female): normal female external genitalia, normal urethral meatus, vaginal mucosal atrophy noted, normal cervix, adnexae, and uterus without masses or abnormal discharge  MS: no musculoskeletal defects " are noted and gait is age appropriate without ataxia  SKIN: no suspicious lesions or rashes  NEURO: Normal strength and tone, sensory exam grossly normal, mentation intact and speech normal  PSYCH: mentation appears normal and affect normal/bright    Diagnostic Test Results:  Lab     ASSESSMENT/PLAN:   1. Encounter for general adult medical examination with abnormal findings      2. Vitamin D deficiency    - Vitamin D Deficiency    3. Mixed hyperlipidemia    - Lipid panel reflex to direct LDL Fasting  - Comprehensive metabolic panel    4. Hyperlipidemia with target LDL less than 130    - Lipid panel reflex to direct LDL Fasting  - Comprehensive metabolic panel    5. Gastroesophageal reflux disease with esophagitis  Wants new PPI   - pantoprazole (PROTONIX) 20 MG EC tablet; Take 2 tablets (40 mg) by mouth daily  Dispense: 180 tablet; Refill: 3    6. Fatigue, unspecified type  Last HGB was normal   - Lipid panel reflex to direct LDL Fasting  - Comprehensive metabolic panel  - CBC with platelets and differential  - Iron and iron binding capacity  - Ferritin  - TSH with free T4 reflex    7. Other iron deficiency anemia    - CBC with platelets and differential  - Iron and iron binding capacity  - Ferritin  - Vitamin B12    8. Encounter for screening for HIV    - HIV Antigen Antibody Combo    9. Encounter for screening mammogram for breast cancer    - *MA Screening Digital Bilateral; Future    10. Postmenopausal status    - DX Hip/Pelvis/Spine; Future    11. Other constipation    - polyethylene glycol (MIRALAX/GLYCOLAX) packet; Take 17 g by mouth daily  Dispense: 100 packet; Refill: 3    12. Need for vaccination    - TDAP VACCINE (ADACEL) [48013.002]  - 1st  Administration  [95597]    13. Need for prophylactic vaccination and inoculation against influenza    - INFLUENZA QUAD, RECOMBINANT, P-FREE (RIV4) (FLUBLOCK) [30224]  - Vaccine Administration, Each Additional [45518]    COUNSELING:  Reviewed preventive health  "counseling, as reflected in patient instructions       Regular exercise       Healthy diet/nutrition       Osteoporosis Prevention/Bone Health    Estimated body mass index is 26.15 kg/m  as calculated from the following:    Height as of this encounter: 1.676 m (5' 6\").    Weight as of this encounter: 73.5 kg (162 lb).         reports that she has never smoked. She has never used smokeless tobacco.      Counseling Resources:  ATP IV Guidelines  Pooled Cohorts Equation Calculator  Breast Cancer Risk Calculator  FRAX Risk Assessment  ICSI Preventive Guidelines  Dietary Guidelines for Americans, 2010  USDA's MyPlate  ASA Prophylaxis  Lung CA Screening    TRAVIS Boyd [facility]  "

## 2020-02-07 DIAGNOSIS — D50.9 IRON DEFICIENCY ANEMIA, UNSPECIFIED IRON DEFICIENCY ANEMIA TYPE: ICD-10-CM

## 2020-02-07 DIAGNOSIS — E55.9 VITAMIN D DEFICIENCY: Primary | ICD-10-CM

## 2020-02-07 LAB
ALBUMIN SERPL-MCNC: 3.4 G/DL (ref 3.4–5)
ALP SERPL-CCNC: 72 U/L (ref 40–150)
ALT SERPL W P-5'-P-CCNC: 18 U/L (ref 0–50)
ANION GAP SERPL CALCULATED.3IONS-SCNC: 5 MMOL/L (ref 3–14)
AST SERPL W P-5'-P-CCNC: 19 U/L (ref 0–45)
BILIRUB SERPL-MCNC: 0.4 MG/DL (ref 0.2–1.3)
BUN SERPL-MCNC: 9 MG/DL (ref 7–30)
CALCIUM SERPL-MCNC: 8.7 MG/DL (ref 8.5–10.1)
CHLORIDE SERPL-SCNC: 109 MMOL/L (ref 94–109)
CHOLEST SERPL-MCNC: 240 MG/DL
CO2 SERPL-SCNC: 25 MMOL/L (ref 20–32)
CREAT SERPL-MCNC: 0.67 MG/DL (ref 0.52–1.04)
DEPRECATED CALCIDIOL+CALCIFEROL SERPL-MC: 23 UG/L (ref 20–75)
FERRITIN SERPL-MCNC: 7 NG/ML (ref 8–252)
GFR SERPL CREATININE-BSD FRML MDRD: >90 ML/MIN/{1.73_M2}
GLUCOSE SERPL-MCNC: 93 MG/DL (ref 70–99)
HDLC SERPL-MCNC: 61 MG/DL
HIV 1+2 AB+HIV1 P24 AG SERPL QL IA: NONREACTIVE
IRON SATN MFR SERPL: 10 % (ref 15–46)
IRON SERPL-MCNC: 35 UG/DL (ref 35–180)
LDLC SERPL CALC-MCNC: 154 MG/DL
NONHDLC SERPL-MCNC: 179 MG/DL
POTASSIUM SERPL-SCNC: 3.5 MMOL/L (ref 3.4–5.3)
PROT SERPL-MCNC: 7.5 G/DL (ref 6.8–8.8)
SODIUM SERPL-SCNC: 139 MMOL/L (ref 133–144)
TIBC SERPL-MCNC: 361 UG/DL (ref 240–430)
TRIGL SERPL-MCNC: 126 MG/DL
TSH SERPL DL<=0.005 MIU/L-ACNC: 1.4 MU/L (ref 0.4–4)

## 2020-02-07 RX ORDER — FERROUS GLUCONATE 324(38)MG
324 TABLET ORAL
Qty: 90 TABLET | Refills: 3 | Status: SHIPPED | OUTPATIENT
Start: 2020-02-07 | End: 2021-07-20

## 2020-02-07 RX ORDER — CHOLECALCIFEROL (VITAMIN D3) 50 MCG
2 TABLET ORAL DAILY
Qty: 200 TABLET | Refills: 3 | Status: SHIPPED | OUTPATIENT
Start: 2020-02-07 | End: 2021-07-20

## 2020-02-10 ENCOUNTER — ANCILLARY PROCEDURE (OUTPATIENT)
Dept: BONE DENSITY | Facility: CLINIC | Age: 60
End: 2020-02-10
Attending: NURSE PRACTITIONER
Payer: COMMERCIAL

## 2020-02-10 DIAGNOSIS — Z78.0 POSTMENOPAUSAL STATUS: ICD-10-CM

## 2020-02-10 PROCEDURE — 77080 DXA BONE DENSITY AXIAL: CPT | Performed by: INTERNAL MEDICINE

## 2020-02-19 ENCOUNTER — APPOINTMENT (OUTPATIENT)
Dept: INTERPRETER SERVICES | Facility: CLINIC | Age: 60
End: 2020-02-19
Payer: COMMERCIAL

## 2020-02-25 ENCOUNTER — OFFICE VISIT (OUTPATIENT)
Dept: INTERNAL MEDICINE | Facility: CLINIC | Age: 60
End: 2020-02-25
Payer: COMMERCIAL

## 2020-02-25 VITALS
BODY MASS INDEX: 26.2 KG/M2 | OXYGEN SATURATION: 99 % | RESPIRATION RATE: 20 BRPM | WEIGHT: 163 LBS | HEIGHT: 66 IN | HEART RATE: 70 BPM | DIASTOLIC BLOOD PRESSURE: 74 MMHG | TEMPERATURE: 98 F | SYSTOLIC BLOOD PRESSURE: 116 MMHG

## 2020-02-25 DIAGNOSIS — E78.2 MIXED HYPERLIPIDEMIA: ICD-10-CM

## 2020-02-25 DIAGNOSIS — R53.83 FATIGUE, UNSPECIFIED TYPE: Primary | ICD-10-CM

## 2020-02-25 DIAGNOSIS — K21.9 GASTROESOPHAGEAL REFLUX DISEASE WITHOUT ESOPHAGITIS: ICD-10-CM

## 2020-02-25 DIAGNOSIS — E55.9 VITAMIN D DEFICIENCY: ICD-10-CM

## 2020-02-25 PROCEDURE — 99213 OFFICE O/P EST LOW 20 MIN: CPT | Performed by: NURSE PRACTITIONER

## 2020-02-25 ASSESSMENT — MIFFLIN-ST. JEOR: SCORE: 1321.11

## 2020-02-25 NOTE — NURSING NOTE
"Chief Complaint   Patient presents with     RECHECK     follow up labs     initial /74   Pulse 70   Temp 98  F (36.7  C) (Oral)   Resp 20   Ht 1.676 m (5' 6\")   Wt 73.9 kg (163 lb)   LMP 09/23/2004   SpO2 99%   BMI 26.31 kg/m   Estimated body mass index is 26.31 kg/m  as calculated from the following:    Height as of this encounter: 1.676 m (5' 6\").    Weight as of this encounter: 73.9 kg (163 lb)..  bp completed using cuff size regular  BRIANNE BISHOP LPN  "

## 2020-02-25 NOTE — PROGRESS NOTES
".Subjective     Colette Patricia is a 59 year old female who presents to clinic today for the following health issues:    HPI   Chief Complaint   Patient presents with     RECHECK     follow up labs    Discussed lab results              Patient Active Problem List   Diagnosis     Headache     Insomnia     Generalized osteoarthrosis, unspecified site     Other unspecified back disorder     Low back pain     GERD (gastroesophageal reflux disease)     Advanced directives, counseling/discussion     Mixed hyperlipidemia     Hyperlipidemia with target LDL less than 130     Gastroesophageal reflux disease, esophagitis presence not specified     Chronic left-sided low back pain without sciatica     Gastroesophageal reflux disease without esophagitis     Foraminal stenosis of lumbosacral region     Fatigue, unspecified type     Vitamin D deficiency     Osteopenia of spine     History reviewed. No pertinent surgical history.    Social History     Tobacco Use     Smoking status: Never Smoker     Smokeless tobacco: Never Used   Substance Use Topics     Alcohol use: No     Family History   Problem Relation Age of Onset     Family History Negative Mother      Family History Negative Father      Glaucoma No family hx of      Macular Degeneration No family hx of              Reviewed and updated as needed this visit by Provider  Tobacco  Allergies  Meds  Problems  Med Hx  Surg Hx  Fam Hx         Review of Systems   ROS COMP: Constitutional, HEENT, cardiovascular, pulmonary, gi and gu systems are negative, except as otherwise noted.      Objective    /74   Pulse 70   Temp 98  F (36.7  C) (Oral)   Resp 20   Ht 1.676 m (5' 6\")   Wt 73.9 kg (163 lb)   LMP 09/23/2004   SpO2 99%   BMI 26.31 kg/m    Body mass index is 26.31 kg/m .  Physical Exam   GENERAL: alert and no distress- here with    RESP: lungs clear to auscultation - no rales, rhonchi or wheezes  CV: regular rate and rhythm  PSYCH: mentation appears " "normal, affect normal/bright    Diagnostic Test Results:  none         Assessment & Plan     There are no diagnoses linked to this encounter.     BMI:   Estimated body mass index is 26.31 kg/m  as calculated from the following:    Height as of this encounter: 1.676 m (5' 6\").    Weight as of this encounter: 73.9 kg (163 lb).           Patient Instructions   Lab reviewed and discussed       Return in about 6 months (around 8/25/2020).    TRAVIS Boyd [facility]        "

## 2021-01-04 NOTE — PATIENT INSTRUCTIONS
Eating Heart-Healthy Food: Using the DASH Plan    Eating for your heart doesn t have to be hard or boring. You just need to know how to make healthier choices. The DASH eating plan has been developed to help you do just that. DASH stands for Dietary Approaches to Stop Hypertension. It is a plan that has been proven to be healthier for your heart and to lower your risk for high blood pressure. It can also help lower your risk for cancer, heart disease, osteoporosis, and diabetes.  Choosing from each food group  Choose foods from each of the food groups below each day. Try to get the recommended number of servings for each food group. The serving numbers are based on a diet of 2,000 calories a day. Talk to your doctor if you re unsure about your calorie needs. Along with getting the correct servings, the DASH plan also recommends a sodium intake less than 2,300 mg per day.        Grains  Servings: 6 to 8 a day  A serving is:    1 slice bread    1 ounce dry cereal    Half a cup cooked rice, pasta or cereal  Best choices: Whole grains and any grains high in fiber. Vegetables  Servings: 4 to 5 a day  A serving is:    1 cup raw leafy vegetable    Half a cup cut-up raw or cooked vegetable    Half a cup vegetable juice  Best choices: Fresh or frozen vegetables prepared without added salt or fat.   Fruits  Servings: 4 to 5 a day  A serving is:    1 medium fruit    One-quarter cup dried fruit    Half a cup fresh, frozen, or canned fruit    Half a cup of 100% fruit juices  Best choices: A variety of fresh fruits of different colors. Whole fruits are a better choice than fruit juices. Low-fat or fat-free dairy  Servings: 2 to 3 a day  A serving is:    1 cup milk    1 cup yogurt    One and a half ounces cheese  Best choices: Skim or 1% milk, low-fat or fat-free yogurt or buttermilk, and low-fat cheeses.         Lean meats, poultry, fish  Servings: 6 or fewer a day  A serving is:    1 ounce cooked meats, poultry, or fish    1  Recent PHQ 2/9 Score    PHQ 2:  Date Adult PHQ 2 Score Adult PHQ 2 Interpretation   1/4/2021 0 No further screening needed       PHQ 9:      egg  Best choices: Lean poultry and fish. Trim away visible fat. Broil, grill, roast, or boil instead of frying. Remove skin from poultry before eating. Limit how much red meat you eat.  Nuts, seeds, beans  Servings: 4 to 5 a week  A serving is:    One-third cup nuts (one and a half ounces)    2 tablespoons nut butter or seeds    Half a cup cooked dry beans or legumes  Best choices: Dry roasted nuts with no salt added, lentils, kidney beans, garbanzo beans, and whole johnson beans.   Fats and oils  Servings: 2 to 3 a day  A serving is:    1 teaspoon vegetable oil    1 teaspoon soft margarine    1 tablespoon mayonnaise    2 tablespoons salad dressing  Best choices: Nut and vegetable oils (nontropical vegetable oils), such as olive and canola oil. Sweets  Servings: 5 a week or fewer  A serving is:    1 tablespoon sugar, maple syrup, or honey    1 tablespoon jam or jelly    1 half-ounce jelly beans (about 15)    1 cup lemonade  Best choices: Dried fruit can be a satisfying sweet. Choose low-fat sweets. And watch your serving sizes!      For more on the DASH eating plan, visit:  www.nhlbi.nih.gov/health/health-topics/topics/dash   Date Last Reviewed: 6/1/2016 2000-2017 [employer]. [address] [place] [address], [place] [address]. All rights reserved. This information is not intended as a substitute for professional medical care. Always follow your healthcare professional's instructions.

## 2021-07-01 ENCOUNTER — APPOINTMENT (OUTPATIENT)
Dept: INTERPRETER SERVICES | Facility: CLINIC | Age: 61
End: 2021-07-01
Payer: COMMERCIAL

## 2021-07-01 ENCOUNTER — NURSE TRIAGE (OUTPATIENT)
Dept: INTERNAL MEDICINE | Facility: CLINIC | Age: 61
End: 2021-07-01

## 2021-07-01 NOTE — TELEPHONE ENCOUNTER
Spoke patient through  services.  She is having difficulty breathing.  Forces to breath in and out.  Started a few maybe 3 month ago [place]. Its worsen when laying down.  Also when walking and when eating.  Would like to be seen in clinic.  She does have an appointment for tomorrow.  Will keep that.  Advised is symptoms worsen she would need to be seen sooner.  Patient agrees to plan.  Ebenezer Farris, HARJEETN, RN, PHN  [facility] ~ Registered Nurse  Clinic Triage ~ [facility]  July 1, 2021      Reason for Disposition    MODERATE longstanding difficulty breathing (e.g., speaks in phrases, SOB even at rest, pulse 100-120) and SAME as normal    Additional Information    Negative: Breathing stopped and hasn't returned    Negative: Choking on something    Negative: SEVERE difficulty breathing (e.g., struggling for each breath, speaks in single words, pulse > 120)    Negative: Bluish (or gray) lips or face    Negative: Difficult to awaken or acting confused (e.g., disoriented, slurred speech)    Negative: Passed out (i.e., fainted, collapsed and was not responding)    Negative: Wheezing started suddenly after medicine, an allergic food, or bee sting    Negative: Stridor    Negative: Slow, shallow and weak breathing    Negative: Sounds like a life-threatening emergency to the triager    Negative: Chest pain    Negative: Wheezing (high pitched whistling sound) and previous asthma attacks or use of asthma medicines    Negative: Difficulty breathing and only present when coughing    Negative: Difficulty breathing and only from stuffy or runny nose    Negative: Difficulty breathing and within 14 days of COVID-19 Exposure    Negative: MODERATE difficulty breathing (e.g., speaks in phrases, SOB even at rest, pulse 100-120) of new onset or worse than normal    Negative: Wheezing can be heard across the room    Negative: Drooling or spitting out saliva (because can't swallow)     "Negative: Any history of prior \"blood clot\" in leg or lungs    Negative: Illness requiring prolonged bedrest in past month (e.g., immobilization, long hospital stay)    Negative: Hip or leg fracture (broken bone) in past month (or had cast on leg or ankle in past month)    Negative: Major surgery in the past month    Negative: Long-distance travel in past month (e.g., car, bus, train, plane; with trip lasting 6 or more hours)    Negative: Extra heart beats OR irregular heart beating   (i.e., \"palpitations\")    Negative: Fever > 103 F (39.4 C)    Negative: Fever > 101 F (38.3 C) and over 60 years of age    Negative: Fever > 100.0 F (37.8 C) and bedridden (e.g., nursing home patient, stroke, chronic illness, recovering from surgery)    Negative: Fever > 100.0 F (37.8 C) and diabetes mellitus or weak immune system (e.g., HIV positive, cancer chemo, splenectomy, organ transplant, chronic steroids)    Negative: Periods where breathing stops and then resumes normally and bedridden (e.g., nursing home patient, CVA)    Negative: Pregnant or postpartum (from 0 to 6 weeks after delivery)    Negative: Patient sounds very sick or weak to the triager    Negative: MILD difficulty breathing (e.g., minimal/no SOB at rest, SOB with walking, pulse < 100) of new onset or worse than normal    Negative: Longstanding difficulty breathing (e.g., CHF, COPD, emphysema) and worse than normal    Negative: Longstanding difficulty breathing and not responding to usual therapy    Negative: Continuous (nonstop) coughing    Negative: Patient wants to be seen    Protocols used: BREATHING DIFFICULTY-A-OH      "

## 2021-07-02 ENCOUNTER — OFFICE VISIT (OUTPATIENT)
Dept: INTERNAL MEDICINE | Facility: CLINIC | Age: 61
End: 2021-07-02
Payer: COMMERCIAL

## 2021-07-02 VITALS
DIASTOLIC BLOOD PRESSURE: 70 MMHG | SYSTOLIC BLOOD PRESSURE: 110 MMHG | HEART RATE: 70 BPM | TEMPERATURE: 98.2 F | BODY MASS INDEX: 24.59 KG/M2 | RESPIRATION RATE: 12 BRPM | HEIGHT: 66 IN | OXYGEN SATURATION: 98 % | WEIGHT: 153 LBS

## 2021-07-02 DIAGNOSIS — K21.00 GASTROESOPHAGEAL REFLUX DISEASE WITH ESOPHAGITIS WITHOUT HEMORRHAGE: ICD-10-CM

## 2021-07-02 DIAGNOSIS — Z86.2 HISTORY OF ANEMIA: ICD-10-CM

## 2021-07-02 DIAGNOSIS — R06.02 SOB (SHORTNESS OF BREATH): Primary | ICD-10-CM

## 2021-07-02 DIAGNOSIS — E55.9 VITAMIN D DEFICIENCY: ICD-10-CM

## 2021-07-02 DIAGNOSIS — E78.2 MIXED HYPERLIPIDEMIA: ICD-10-CM

## 2021-07-02 LAB
BASOPHILS # BLD AUTO: 0 10E9/L (ref 0–0.2)
BASOPHILS NFR BLD AUTO: 0.3 %
DIFFERENTIAL METHOD BLD: NORMAL
EOSINOPHIL # BLD AUTO: 0.1 10E9/L (ref 0–0.7)
EOSINOPHIL NFR BLD AUTO: 1.8 %
ERYTHROCYTE [DISTWIDTH] IN BLOOD BY AUTOMATED COUNT: 12.1 % (ref 10–15)
HCT VFR BLD AUTO: 40 % (ref 35–47)
HGB BLD-MCNC: 13.6 G/DL (ref 11.7–15.7)
LYMPHOCYTES # BLD AUTO: 1.9 10E9/L (ref 0.8–5.3)
LYMPHOCYTES NFR BLD AUTO: 28.5 %
MCH RBC QN AUTO: 32.5 PG (ref 26.5–33)
MCHC RBC AUTO-ENTMCNC: 34 G/DL (ref 31.5–36.5)
MCV RBC AUTO: 96 FL (ref 78–100)
MONOCYTES # BLD AUTO: 0.6 10E9/L (ref 0–1.3)
MONOCYTES NFR BLD AUTO: 8.9 %
NEUTROPHILS # BLD AUTO: 4.1 10E9/L (ref 1.6–8.3)
NEUTROPHILS NFR BLD AUTO: 60.5 %
PLATELET # BLD AUTO: 206 10E9/L (ref 150–450)
RBC # BLD AUTO: 4.19 10E12/L (ref 3.8–5.2)
WBC # BLD AUTO: 6.7 10E9/L (ref 4–11)

## 2021-07-02 PROCEDURE — 99214 OFFICE O/P EST MOD 30 MIN: CPT | Performed by: FAMILY MEDICINE

## 2021-07-02 PROCEDURE — 83550 IRON BINDING TEST: CPT | Performed by: FAMILY MEDICINE

## 2021-07-02 PROCEDURE — 83540 ASSAY OF IRON: CPT | Performed by: FAMILY MEDICINE

## 2021-07-02 PROCEDURE — 80053 COMPREHEN METABOLIC PANEL: CPT | Performed by: FAMILY MEDICINE

## 2021-07-02 PROCEDURE — 93000 ELECTROCARDIOGRAM COMPLETE: CPT | Performed by: FAMILY MEDICINE

## 2021-07-02 PROCEDURE — 85025 COMPLETE CBC W/AUTO DIFF WBC: CPT | Performed by: FAMILY MEDICINE

## 2021-07-02 PROCEDURE — 82306 VITAMIN D 25 HYDROXY: CPT | Performed by: FAMILY MEDICINE

## 2021-07-02 PROCEDURE — 80061 LIPID PANEL: CPT | Performed by: FAMILY MEDICINE

## 2021-07-02 PROCEDURE — 36415 COLL VENOUS BLD VENIPUNCTURE: CPT | Performed by: FAMILY MEDICINE

## 2021-07-02 RX ORDER — PANTOPRAZOLE SODIUM 40 MG/1
40 TABLET, DELAYED RELEASE ORAL DAILY
Qty: 30 TABLET | Refills: 5 | Status: SHIPPED | OUTPATIENT
Start: 2021-07-02 | End: 2023-06-07

## 2021-07-02 ASSESSMENT — MIFFLIN-ST. JEOR: SCORE: 1265.75

## 2021-07-02 NOTE — PROGRESS NOTES
"  (R06.02) SOB (shortness of breath)  (primary encounter diagnosis)  Comment:   No apparent dyspnea at rest today.  Rule out redevelopment of anemia.  EKG within normal limits.  Exam not suggestive of CHF.  Plan: EKG 12-lead complete w/read - Clinics        Lab work today.  I asked her to have a follow-up check.      (Z86.2) History of anemia  Comment:   Plan: CBC with platelets and differential, Iron and         iron binding capacity            (K21.00) Gastroesophageal reflux disease with esophagitis without hemorrhage  Comment:   Some of the chest pressure after eating sounds like reflux.  Restart medication.  Plan: pantoprazole (PROTONIX) 40 MG EC tablet        Follow-up as above.      (E78.2) Mixed hyperlipidemia  Comment:   Plan: Comprehensive metabolic panel (BMP + Alb, Alk         Phos, ALT, AST, Total. Bili, TP), Lipid Profile        (Chol, Trig, HDL, LDL calc)            (E55.9) Vitamin D deficiency  Comment:   Plan: Vitamin D Deficiency                Subjective       Follow-up S OB, chest pressure, question of heart problem.        HPI     Pt c/o SOB. Was in [facility] and had an echo, doctor said, \"your heart is not good\". Pt was given Torasemide 5 mg for 28 days in February, 2021. Symptoms improved while on med. Pt is fasting today for lab work.    Patient was in [facility] back in January and took torsemide for about a month.    She says she is feeling short winded.  Also complains of some chest pressure particularly when lying down attempting to sleep or when eating.  It is not an exertional discomfort.  She has a past history of taking Protonix for GERD.    No history of heart problems documented in our record.  She does have a history of anemia.  Presently not on any medications.        Review of Systems     No fever.  No cough.  No exertional chest pain.  No lower extremity swelling.  No syncope.  No abdominal pain or nausea.  No localized weakness or dizziness.          Objective    /70   Pulse 70  " " Temp 98.2  F (36.8  C) (Oral)   Resp 12   Ht 1.676 m (5' 6\")   Wt 69.4 kg (153 lb)   LMP 09/23/2004   SpO2 98%   BMI 24.69 kg/m    Body mass index is 24.69 kg/m .  Physical Exam     Alert patient, NAD.  HEENT unremarkable.  Neck no thyromegaly.  Chest clear, respiration nonlabored  Cardiac RSR, normal rate, no murmurs  Abdomen is nontender  Extremities no edema  Speech and gait normal        "

## 2021-07-03 LAB
ALBUMIN SERPL-MCNC: 3.7 G/DL (ref 3.4–5)
ALP SERPL-CCNC: 76 U/L (ref 40–150)
ALT SERPL W P-5'-P-CCNC: 25 U/L (ref 0–50)
ANION GAP SERPL CALCULATED.3IONS-SCNC: 2 MMOL/L (ref 3–14)
AST SERPL W P-5'-P-CCNC: 21 U/L (ref 0–45)
BILIRUB SERPL-MCNC: 0.6 MG/DL (ref 0.2–1.3)
BUN SERPL-MCNC: 11 MG/DL (ref 7–30)
CALCIUM SERPL-MCNC: 9 MG/DL (ref 8.5–10.1)
CHLORIDE SERPL-SCNC: 106 MMOL/L (ref 94–109)
CHOLEST SERPL-MCNC: 253 MG/DL
CO2 SERPL-SCNC: 30 MMOL/L (ref 20–32)
CREAT SERPL-MCNC: 0.72 MG/DL (ref 0.52–1.04)
GFR SERPL CREATININE-BSD FRML MDRD: 89 ML/MIN/{1.73_M2}
GLUCOSE SERPL-MCNC: 83 MG/DL (ref 70–99)
HDLC SERPL-MCNC: 64 MG/DL
IRON SATN MFR SERPL: 35 % (ref 15–46)
IRON SERPL-MCNC: 112 UG/DL (ref 35–180)
LDLC SERPL CALC-MCNC: 169 MG/DL
NONHDLC SERPL-MCNC: 189 MG/DL
POTASSIUM SERPL-SCNC: 3.8 MMOL/L (ref 3.4–5.3)
PROT SERPL-MCNC: 7.6 G/DL (ref 6.8–8.8)
SODIUM SERPL-SCNC: 138 MMOL/L (ref 133–144)
TIBC SERPL-MCNC: 320 UG/DL (ref 240–430)
TRIGL SERPL-MCNC: 98 MG/DL

## 2021-07-05 LAB — DEPRECATED CALCIDIOL+CALCIFEROL SERPL-MC: 37 UG/L (ref 20–75)

## 2021-07-20 ENCOUNTER — ANCILLARY PROCEDURE (OUTPATIENT)
Dept: GENERAL RADIOLOGY | Facility: CLINIC | Age: 61
End: 2021-07-20
Attending: NURSE PRACTITIONER
Payer: COMMERCIAL

## 2021-07-20 ENCOUNTER — OFFICE VISIT (OUTPATIENT)
Dept: INTERNAL MEDICINE | Facility: CLINIC | Age: 61
End: 2021-07-20
Payer: COMMERCIAL

## 2021-07-20 VITALS
SYSTOLIC BLOOD PRESSURE: 122 MMHG | BODY MASS INDEX: 25.23 KG/M2 | DIASTOLIC BLOOD PRESSURE: 74 MMHG | HEART RATE: 75 BPM | RESPIRATION RATE: 22 BRPM | WEIGHT: 157 LBS | TEMPERATURE: 98.3 F | OXYGEN SATURATION: 99 % | HEIGHT: 66 IN

## 2021-07-20 DIAGNOSIS — E78.5 HYPERLIPIDEMIA WITH TARGET LDL LESS THAN 130: ICD-10-CM

## 2021-07-20 DIAGNOSIS — R06.02 SOB (SHORTNESS OF BREATH): Primary | ICD-10-CM

## 2021-07-20 DIAGNOSIS — R06.09 DOE (DYSPNEA ON EXERTION): ICD-10-CM

## 2021-07-20 DIAGNOSIS — R06.02 SOB (SHORTNESS OF BREATH): ICD-10-CM

## 2021-07-20 PROCEDURE — 71046 X-RAY EXAM CHEST 2 VIEWS: CPT | Performed by: RADIOLOGY

## 2021-07-20 PROCEDURE — 99214 OFFICE O/P EST MOD 30 MIN: CPT | Performed by: NURSE PRACTITIONER

## 2021-07-20 ASSESSMENT — MIFFLIN-ST. JEOR: SCORE: 1283.9

## 2021-07-20 NOTE — PATIENT INSTRUCTIONS
Chest x ray in [address]    Echocardiogram     Referral to cardiology   Referral to pulmonology     7.0%  10-year risk of heart disease or stroke    On the basis of your age and calculated risk for heart disease or stroke under 10%, the USPSTF guidelines suggest you would not likely benefit from starting aspirin.    On the basis of your calculated risk for heart disease or stroke less than 7.5%, the ACC/AHA guidelines suggest you have no indication to be on a statin.    Based on your age, your blood pressure is well-controlled.    Demography Cholesterol Blood pressure Risk factors  Age: 61 Total: 253 Systolic: 122 Diabetes: no  Gender: female HDL: 64 Diastolic: 74 Smoking: no  Race: -American  On medication: no

## 2021-07-20 NOTE — PROGRESS NOTES
"    Assessment & Plan     SOB (shortness of breath)  Now she says she had an echo done in [place] that was abnormal and she was put on torsemide for  A month   felt ok for 2 months and now feeling off for a month   Want to check echo and see what it shows   - Adult Cardiology Eval Referral; Future  - Adult Pulmonary Medicine Referral; Future  - XR Chest 2 Views; Future  - Echocardiogram Complete; Future    GONZALES (dyspnea on exertion)    - Adult Cardiology Eval Referral; Future  - Adult Pulmonary Medicine Referral; Future  - XR Chest 2 Views; Future  - Echocardiogram Complete; Future    Hyperlipidemia with target LDL less than 130  Discussed recent lab and cholesterol high   Will work on diet and exercise and recheck in a year         26 minutes spent on the date of the encounter doing chart review, history and exam, documentation and further activities per the note       BMI:   Estimated body mass index is 25.34 kg/m  as calculated from the following:    Height as of this encounter: 1.676 m (5' 6\").    Weight as of this encounter: 71.2 kg (157 lb).       Patient Instructions   Chest x ray in [address]    Echocardiogram     Referral to cardiology   Referral to pulmonology     7.0%  10-year risk of heart disease or stroke    On the basis of your age and calculated risk for heart disease or stroke under 10%, the USPSTF guidelines suggest you would not likely benefit from starting aspirin.    On the basis of your calculated risk for heart disease or stroke less than 7.5%, the ACC/AHA guidelines suggest you have no indication to be on a statin.    Based on your age, your blood pressure is well-controlled.    Demography Cholesterol Blood pressure Risk factors  Age: 61 Total: 253 Systolic: 122 Diabetes: no  Gender: female HDL: 64 Diastolic: 74 Smoking: no  Race: -American  On medication: no       Return in about 4 weeks (around 8/17/2021) for depending on results .    TRAVIS Boyd CNP  [facility] " "CLINIC [place]    Juani Alvarenga is a 61 year old who presents for the following health issues     HPI   Chief Complaint   Patient presents with     RECHECK     SOB is the same     Saw Dr Leavitt and lab were done and  Cholesterol is high   EKG normal     Echo cardiogram - [place] gave her medication for  A month - torsemide - torasdenk 5 mg   She felt better on that medication in March   May and june was ok   So since that time now feels like she is breathing harder   Said maybe a valve was not working     Ear heavy - both ear pain     Feels she cannot breathe well if she bends forwards     Review of Systems   Constitutional, HEENT, cardiovascular, pulmonary, GI, , musculoskeletal, neuro, skin, endocrine and psych systems are negative, except as otherwise noted.      Objective    /74   Pulse 75   Temp 98.3  F (36.8  C) (Oral)   Resp 22   Ht 1.676 m (5' 6\")   Wt 71.2 kg (157 lb)   LMP 09/23/2004   SpO2 99%   BMI 25.34 kg/m    Body mass index is 25.34 kg/m .  Physical Exam   GENERAL: alert and no distress  RESP: lungs clear to auscultation - no rales, rhonchi or wheezes  CV: regular rate and rhythm  ABDOMEN: soft, nontender, and bowel sounds normal  MS: no gross musculoskeletal defects noted, no edema  NEURO: Normal strength and tone, mentation intact and speech normal  PSYCH: mentation appears normal, affect normal/bright    Reviewed lab             "

## 2021-07-20 NOTE — NURSING NOTE
"Chief Complaint   Patient presents with     RECHECK     SOB is the same     initial /74   Pulse 75   Temp 98.3  F (36.8  C) (Oral)   Resp 22   Ht 1.676 m (5' 6\")   Wt 71.2 kg (157 lb)   LMP 09/23/2004   SpO2 99%   BMI 25.34 kg/m   Estimated body mass index is 25.34 kg/m  as calculated from the following:    Height as of this encounter: 1.676 m (5' 6\").    Weight as of this encounter: 71.2 kg (157 lb)..  bp completed using cuff size regular  BRIANNE BISHOP LPN  "

## 2021-08-12 ENCOUNTER — HOSPITAL ENCOUNTER (OUTPATIENT)
Dept: CARDIOLOGY | Facility: CLINIC | Age: 61
Discharge: HOME | End: 2021-08-12
Attending: NURSE PRACTITIONER | Admitting: NURSE PRACTITIONER
Payer: COMMERCIAL

## 2021-08-12 DIAGNOSIS — R06.09 DOE (DYSPNEA ON EXERTION): ICD-10-CM

## 2021-08-12 DIAGNOSIS — R06.02 SOB (SHORTNESS OF BREATH): ICD-10-CM

## 2021-08-12 LAB — LVEF ECHO: NORMAL

## 2021-08-12 PROCEDURE — 93306 TTE W/DOPPLER COMPLETE: CPT | Mod: 26 | Performed by: INTERNAL MEDICINE

## 2021-08-12 PROCEDURE — 93306 TTE W/DOPPLER COMPLETE: CPT

## 2022-05-20 ENCOUNTER — OFFICE VISIT (OUTPATIENT)
Dept: FAMILY MEDICINE | Facility: CLINIC | Age: 62
End: 2022-05-20
Payer: COMMERCIAL

## 2022-05-20 VITALS
SYSTOLIC BLOOD PRESSURE: 139 MMHG | BODY MASS INDEX: 25.34 KG/M2 | OXYGEN SATURATION: 98 % | WEIGHT: 157 LBS | DIASTOLIC BLOOD PRESSURE: 87 MMHG | HEART RATE: 89 BPM | TEMPERATURE: 97.8 F | RESPIRATION RATE: 16 BRPM

## 2022-05-20 DIAGNOSIS — R21 RASH: Primary | ICD-10-CM

## 2022-05-20 PROCEDURE — 99213 OFFICE O/P EST LOW 20 MIN: CPT | Performed by: FAMILY MEDICINE

## 2022-05-20 RX ORDER — CEPHALEXIN 500 MG/1
500 TABLET ORAL 3 TIMES DAILY
Qty: 21 TABLET | Refills: 0 | Status: SHIPPED | OUTPATIENT
Start: 2022-05-20 | End: 2022-05-27

## 2022-05-20 RX ORDER — TRIAMCINOLONE ACETONIDE 5 MG/G
CREAM TOPICAL 2 TIMES DAILY
Qty: 30 G | Refills: 1 | Status: SHIPPED | OUTPATIENT
Start: 2022-05-20 | End: 2023-06-07

## 2022-05-20 NOTE — PATIENT INSTRUCTIONS
Use steroid cream twice a day to itchy areas.  Take antibiotics as prescribed.  Keep follow up appointment with your doctor next week.

## 2022-05-20 NOTE — PROGRESS NOTES
Assessment:       Rash  - cephalexin 500 MG tablet  Dispense: 21 tablet; Refill: 0  - triamcinolone (ARISTOCORT HP) 0.5 % external cream  Dispense: 30 g; Refill: 1         Plan:     Patient with diffuse papules in various stages of healing.  Question possible bacterial infection.  I did give her a prescription for cephalexin.  Unfortunately none appear to be amenable to culture.  She does have follow-up scheduled next week with her PCP and advised that she keep this appointment.  Prescription also given for topical triamcinolone cream to help with the itch.  Patient and her daughter are agreeable with this plan.    MEDICATIONS:   Orders Placed This Encounter   Medications     cephalexin 500 MG tablet     Sig: Take 500 mg by mouth 3 times daily for 7 days     Dispense:  21 tablet     Refill:  0     triamcinolone (ARISTOCORT HP) 0.5 % external cream     Sig: Apply topically 2 times daily     Dispense:  30 g     Refill:  1       Subjective:       62 year old female presents for evaluation of a 2-month history of rash.  It started after she was in [facility] and [facility].  She is not sure what she might of come in contact with.  Describes the rash is very itchy bumps that sometimes seems to have some fluid in them.  She scratches them and they open up and then he will but she keeps forming other new lesions all over her body.  She has not had a fever and otherwise feels well.  They are somewhat irritated.  The worst of the rash is on her buttocks.    Patient Active Problem List   Diagnosis     Headache     Insomnia     Generalized osteoarthrosis, unspecified site     Other unspecified back disorder     Low back pain     GERD (gastroesophageal reflux disease)     Advanced directives, counseling/discussion     Mixed hyperlipidemia     Hyperlipidemia with target LDL less than 130     Gastroesophageal reflux disease, esophagitis presence not specified     Chronic left-sided low back pain without sciatica      Gastroesophageal reflux disease without esophagitis     Foraminal stenosis of lumbosacral region     Fatigue, unspecified type     Vitamin D deficiency     Osteopenia of spine       Past Medical History:   Diagnosis Date     Other unspecified back disorder        No past surgical history on file.    Current Outpatient Medications   Medication     pantoprazole (PROTONIX) 40 MG EC tablet     No current facility-administered medications for this visit.       Allergies   Allergen Reactions     No Known Drug Allergies        Family History   Problem Relation Age of Onset     Family History Negative Mother      Family History Negative Father      Glaucoma No family hx of      Macular Degeneration No family hx of        Social History     Socioeconomic History     Marital status:      Spouse name: None     Number of children: None     Years of education: None     Highest education level: None   Tobacco Use     Smoking status: Never Smoker     Smokeless tobacco: Never Used   Vaping Use     Vaping Use: Never used   Substance and Sexual Activity     Alcohol use: No     Drug use: No     Sexual activity: Never         Review of Systems  Pertinent items are noted in HPI.      Objective:     /87   Pulse 89   Temp 97.8  F (36.6  C)   Resp 16   Wt 71.2 kg (157 lb)   LMP 09/23/2004   SpO2 98%   Breastfeeding No   BMI 25.34 kg/m       General appearance: alert, appears stated age and cooperative  Skin: Patient with scattered papules noted on her buttocks as well as abdomen chest back arms and legs.  There is some in various stages of healing and some that seem to have opened up from scratching.  There is some overlying excoriation.  I am not able to identify any pustules or any that appear to be amenable to culture.     This note has been dictated using voice recognition software. Any grammatical or context distortions are unintentional and inherent to the software

## 2022-06-15 ENCOUNTER — TRANSFERRED RECORDS (OUTPATIENT)
Dept: HEALTH INFORMATION MANAGEMENT | Facility: CLINIC | Age: 62
End: 2022-06-15

## 2022-06-17 ENCOUNTER — TRANSCRIBE ORDERS (OUTPATIENT)
Dept: OTHER | Age: 62
End: 2022-06-17

## 2022-06-17 DIAGNOSIS — L50.9 HIVES: Primary | ICD-10-CM

## 2022-06-26 ENCOUNTER — HEALTH MAINTENANCE LETTER (OUTPATIENT)
Age: 62
End: 2022-06-26

## 2022-07-15 NOTE — TELEPHONE ENCOUNTER
FUTURE VISIT INFORMATION      FUTURE VISIT INFORMATION:    Date: 10.25.22    Time: 11:00    Location: [facility]  REFERRAL INFORMATION:    Referring provider:  Kear[place]    Referring providers clinic:  [facility]    Reason for visit/diagnosis  hives, per [facility] , referred by Dr. Elizabeth Sarah, med recs will be faxed    RECORDS REQUESTED FROM:       Clinic name Comments Records Status   [facility] 6.15.22  Keara [facility] 5.20.22  [facility]

## 2022-07-28 ENCOUNTER — HOSPITAL ENCOUNTER (EMERGENCY)
Facility: CLINIC | Age: 62
Discharge: HOME | End: 2022-07-28
Attending: EMERGENCY MEDICINE | Admitting: EMERGENCY MEDICINE
Payer: COMMERCIAL

## 2022-07-28 ENCOUNTER — APPOINTMENT (OUTPATIENT)
Dept: CT IMAGING | Facility: CLINIC | Age: 62
End: 2022-07-28
Attending: EMERGENCY MEDICINE
Payer: COMMERCIAL

## 2022-07-28 VITALS
HEART RATE: 81 BPM | BODY MASS INDEX: 24.69 KG/M2 | SYSTOLIC BLOOD PRESSURE: 128 MMHG | WEIGHT: 153 LBS | DIASTOLIC BLOOD PRESSURE: 82 MMHG | RESPIRATION RATE: 18 BRPM | OXYGEN SATURATION: 98 % | TEMPERATURE: 97.7 F

## 2022-07-28 DIAGNOSIS — L02.31 CELLULITIS AND ABSCESS OF BUTTOCK: ICD-10-CM

## 2022-07-28 DIAGNOSIS — L03.317 CELLULITIS AND ABSCESS OF BUTTOCK: ICD-10-CM

## 2022-07-28 LAB
ANION GAP SERPL CALCULATED.3IONS-SCNC: 7 MMOL/L (ref 7–15)
BASOPHILS # BLD AUTO: 0 10E3/UL (ref 0–0.2)
BASOPHILS NFR BLD AUTO: 1 %
BUN SERPL-MCNC: 12.8 MG/DL (ref 8–23)
CALCIUM SERPL-MCNC: 9.1 MG/DL (ref 8.8–10.2)
CHLORIDE SERPL-SCNC: 100 MMOL/L (ref 98–107)
CREAT SERPL-MCNC: 0.82 MG/DL (ref 0.51–0.95)
DEPRECATED HCO3 PLAS-SCNC: 27 MMOL/L (ref 22–29)
EOSINOPHIL # BLD AUTO: 0.2 10E3/UL (ref 0–0.7)
EOSINOPHIL NFR BLD AUTO: 2 %
ERYTHROCYTE [DISTWIDTH] IN BLOOD BY AUTOMATED COUNT: 12.7 % (ref 10–15)
GFR SERPL CREATININE-BSD FRML MDRD: 79 ML/MIN/1.73M2
GLUCOSE SERPL-MCNC: 101 MG/DL (ref 70–99)
HCT VFR BLD AUTO: 38.1 % (ref 35–47)
HGB BLD-MCNC: 12.3 G/DL (ref 11.7–15.7)
IMM GRANULOCYTES # BLD: 0 10E3/UL
IMM GRANULOCYTES NFR BLD: 0 %
LYMPHOCYTES # BLD AUTO: 1.8 10E3/UL (ref 0.8–5.3)
LYMPHOCYTES NFR BLD AUTO: 21 %
MCH RBC QN AUTO: 31.6 PG (ref 26.5–33)
MCHC RBC AUTO-ENTMCNC: 32.3 G/DL (ref 31.5–36.5)
MCV RBC AUTO: 98 FL (ref 78–100)
MONOCYTES # BLD AUTO: 0.8 10E3/UL (ref 0–1.3)
MONOCYTES NFR BLD AUTO: 10 %
NEUTROPHILS # BLD AUTO: 5.8 10E3/UL (ref 1.6–8.3)
NEUTROPHILS NFR BLD AUTO: 66 %
NRBC # BLD AUTO: 0 10E3/UL
NRBC BLD AUTO-RTO: 0 /100
PLATELET # BLD AUTO: 269 10E3/UL (ref 150–450)
POTASSIUM SERPL-SCNC: 3.9 MMOL/L (ref 3.4–5.3)
RBC # BLD AUTO: 3.89 10E6/UL (ref 3.8–5.2)
SODIUM SERPL-SCNC: 134 MMOL/L (ref 136–145)
WBC # BLD AUTO: 8.7 10E3/UL (ref 4–11)

## 2022-07-28 PROCEDURE — 250N000009 HC RX 250: Performed by: EMERGENCY MEDICINE

## 2022-07-28 PROCEDURE — 80048 BASIC METABOLIC PNL TOTAL CA: CPT | Performed by: EMERGENCY MEDICINE

## 2022-07-28 PROCEDURE — 74177 CT ABD & PELVIS W/CONTRAST: CPT

## 2022-07-28 PROCEDURE — 10060 I&D ABSCESS SIMPLE/SINGLE: CPT

## 2022-07-28 PROCEDURE — 85014 HEMATOCRIT: CPT | Performed by: EMERGENCY MEDICINE

## 2022-07-28 PROCEDURE — 85025 COMPLETE CBC W/AUTO DIFF WBC: CPT | Performed by: EMERGENCY MEDICINE

## 2022-07-28 PROCEDURE — 99284 EMERGENCY DEPT VISIT MOD MDM: CPT

## 2022-07-28 PROCEDURE — 36415 COLL VENOUS BLD VENIPUNCTURE: CPT | Performed by: EMERGENCY MEDICINE

## 2022-07-28 PROCEDURE — 250N000011 HC RX IP 250 OP 636: Performed by: EMERGENCY MEDICINE

## 2022-07-28 RX ORDER — IOPAMIDOL 755 MG/ML
500 INJECTION, SOLUTION INTRAVASCULAR ONCE
Status: COMPLETED | OUTPATIENT
Start: 2022-07-28 | End: 2022-07-28

## 2022-07-28 RX ORDER — CEPHALEXIN 500 MG/1
500 CAPSULE ORAL 4 TIMES DAILY
Qty: 28 CAPSULE | Refills: 0 | Status: SHIPPED | OUTPATIENT
Start: 2022-07-28 | End: 2022-08-04

## 2022-07-28 RX ORDER — LIDOCAINE HYDROCHLORIDE AND EPINEPHRINE 10; 10 MG/ML; UG/ML
INJECTION, SOLUTION INFILTRATION; PERINEURAL
Status: DISCONTINUED
Start: 2022-07-28 | End: 2022-07-28 | Stop reason: HOSPADM

## 2022-07-28 RX ORDER — HYDROCODONE BITARTRATE AND ACETAMINOPHEN 5; 325 MG/1; MG/1
1 TABLET ORAL EVERY 6 HOURS PRN
Qty: 6 TABLET | Refills: 0 | Status: SHIPPED | OUTPATIENT
Start: 2022-07-28 | End: 2023-06-07

## 2022-07-28 RX ADMIN — IOPAMIDOL 70 ML: 755 INJECTION, SOLUTION INTRAVENOUS at 17:53

## 2022-07-28 RX ADMIN — SODIUM CHLORIDE 59 ML: 9 INJECTION, SOLUTION INTRAVENOUS at 17:53

## 2022-07-28 ASSESSMENT — ENCOUNTER SYMPTOMS
FEVER: 0
WOUND: 1
CHILLS: 0

## 2022-07-28 NOTE — ED TRIAGE NOTES
Pt sent over w/ family practice referral: perianal abscess (started 5 days ago) w/ necrotic tissue. Referral for I&D and CT scan.      Triage Assessment     Row Name 07/28/22 9126       Triage Assessment (Adult)    Airway WDL WDL       Respiratory WDL    Respiratory WDL WDL       Skin Circulation/Temperature WDL    Skin Circulation/Temperature WDL WDL       Cardiac WDL    Cardiac WDL WDL       Peripheral/Neurovascular WDL    Peripheral Neurovascular WDL WDL       Cognitive/Neuro/Behavioral WDL    Cognitive/Neuro/Behavioral WDL WDL

## 2022-07-28 NOTE — LETTER
July 28, 2022      To Whom It May Concern:      Colette Patricia was seen in our Emergency Department today, 07/28/22.  I expect her condition to improve.  She may return to work when improved, on or around 08/01/22.    Sincerely,        Zuleyma Donaldson RN

## 2022-07-29 NOTE — ED PROVIDER NOTES
History   Chief Complaint:  Wound Infection    The history is provided by the patient and a relative. A  was used (daughter interpreted over the phone).      Colette Patricia is a 62 year old female who presents with a perianal wound. Colette's wound appeared five days ago and has augmented in size since. [facility]; she went to the clinic in hopes to receive a topical anesthetic. Colette shares that she cannot sit because of the pain caused by the wound. Denies attempts to express the wound, fever, and chills.     Review of Systems   Constitutional: Negative for chills and fever.   Skin: Positive for wound.     Allergies:  The patient has no known allergies.     Medications:  pantoprazole     Past Medical History:     Insomnia   Osteoarthrosis   GERD  HLD  Chronic left-sided low back pain   Foraminal stenosis of lumbosacral region   Spinal osteopenia      Social History:  The patient presents to the ED alone  PCP: MD Patric     Physical Exam     Patient Vitals for the past 24 hrs:   BP Temp Temp src Pulse Resp SpO2 Weight   07/28/22 1524 128/82 97.7  F (36.5  C) Oral 81 18 98 % 69.4 kg (153 lb)     Physical Exam  General:  No respiratory distress.    Cardiovascular: Good cap refill.    Respiratory: Breathing non labored.     Musculoskeletal: No tenderness. No bony deformity.     Skin: No rashes or petechiae. Right buttock abcess 1 cm in size.     Neurologic: non focal      Psychiatric: Appropriate     Emergency Department Course     Imaging:  Abd/pelvis CT,  IV  contrast only TRAUMA / AAA   Final Result   IMPRESSION:    1.  Localized skin thickening/abnormal enhancing soft tissue along the lower right gluteal cleft. This may reflect an infectious or inflammatory process. No associated abscess/drainable fluid collection is identified. Follow-up is recommended to assess    for resolution and to exclude any  potential underlying lesion.        Report per radiology    Laboratory:  Labs Ordered and Resulted from Time of ED Arrival to Time of ED Departure   BASIC METABOLIC PANEL - Abnormal       Result Value    Creatinine 0.82      Sodium 134 (*)     Potassium 3.9      Urea Nitrogen 12.8      Chloride 100      Carbon Dioxide (CO2) 27      Anion Gap 7      Glucose 101 (*)     GFR Estimate 79      Calcium 9.1     CBC WITH PLATELETS AND DIFFERENTIAL    WBC Count 8.7      RBC Count 3.89      Hemoglobin 12.3      Hematocrit 38.1      MCV 98      MCH 31.6      MCHC 32.3      RDW 12.7      Platelet Count 269      % Neutrophils 66      % Lymphocytes 21      % Monocytes 10      % Eosinophils 2      % Basophils 1      % Immature Granulocytes 0      NRBCs per 100 WBC 0      Absolute Neutrophils 5.8      Absolute Lymphocytes 1.8      Absolute Monocytes 0.8      Absolute Eosinophils 0.2      Absolute Basophils 0.0      Absolute Immature Granulocytes 0.0      Absolute NRBCs 0.0       Procedures    Incision and Drainage     Procedure: Incision and Drainage     Consent: Verbal    Indication: Abscess    Location: Trunk    Size: 1 cm    Ultrasound Guidance: No    Preparation: Povidone-iodine     Anesthesia/Sedation: Lidocaine with Epinephrine - 1%     Procedure Detail:    Incision Type: Stab  Scalpel: 11  Lesion Management: Probed and deloculated  and Irrigated   Wound Management: Packing   Packing: Gauze, 1/4 inch     Patient Status: The patient tolerated the procedure well: Yes. There were no complications.    Emergency Department Course:       Reviewed:  I reviewed nursing notes, vitals, past medical history and Care Everywhere    Assessments:  1906 I obtained history and examined the patient as noted above.   1958 I rechecked the patient and explained findings. I completed the procedure as noted above.   2018 I rechecked and discussed discharge with the patient. All questions answered.     Disposition:  The patient was discharged to  home.     Impression & Plan     Medical Decision Making:  The patient presented with a abscess on her buttock.  I think it was likely due to an ingrown hair a deeper tissue infection is unlikely.  There has been already been some drainage from the wound I did open it up with a stab incision and packed it she was started on antibiotics for surrounding cellulitis the patient was discharged home in good condition.  I did not feel she required imaging or labs symptoms to return for discussed.    Diagnosis:    ICD-10-CM    1. Cellulitis and abscess of buttock  L02.31     L03.317        Discharge Medications:  Discharge Medication List as of 7/28/2022  8:41 PM      START taking these medications    Details   cephALEXin (KEFLEX) 500 MG capsule Take 1 capsule (500 mg) by mouth 4 times daily for 7 days, Disp-28 capsule, R-0, Local Print      HYDROcodone-acetaminophen (NORCO) 5-325 MG tablet Take 1 tablet by mouth every 6 hours as needed for pain, Disp-6 tablet, R-0, Local Print           Scribe Disclosure:  I, Azalea Marinelli, am serving as a scribe at 7:06 PM on 7/28/2022 to document services personally performed by Reno Ariza MD based on my observations and the provider's statements to me.      Reno Ariza MD  07/29/22 0009

## 2022-10-22 NOTE — PROGRESS NOTES
[facility] Dermato-allergology Note  Office visit  Encounter Date: Oct 25, 2022  ____________________________________________    CC: No chief complaint on file.      HPI:  (Oct 25, 2022)  Ms. Colette Patricia is a(n) 62 year old female who presents today as new patient for allergy consultation  - patient has an itchy rash on neck and shoulder and flank area  - problems since January  - otherwise feeling well in usual state of health    Physical exam:  General: In no acute distress, well-developed, well-nourished  Eyes: no conjunctivitis  ENT: no signs of rhinitis   Pulmonary: no wheezing or coughing  Skin: over the neck and upper shoulder area typical subacute eczematous lesions and some nummuar lesions on trunk and less extremities.     Past Medical History:   Patient Active Problem List   Diagnosis     Headache     Insomnia     Generalized osteoarthrosis, unspecified site     Other unspecified back disorder     Low back pain     GERD (gastroesophageal reflux disease)     Advanced directives, counseling/discussion     Mixed hyperlipidemia     Hyperlipidemia with target LDL less than 130     Gastroesophageal reflux disease, esophagitis presence not specified     Chronic left-sided low back pain without sciatica     Gastroesophageal reflux disease without esophagitis     Foraminal stenosis of lumbosacral region     Fatigue, unspecified type     Vitamin D deficiency     Osteopenia of spine     Past Medical History:   Diagnosis Date     Other unspecified back disorder        Allergies:  Allergies   Allergen Reactions     No Known Drug Allergies        Medications:  Current Outpatient Medications   Medication     HYDROcodone-acetaminophen (NORCO) 5-325 MG tablet     pantoprazole (PROTONIX) 40 MG EC tablet     triamcinolone (ARISTOCORT HP) 0.5 % external cream     No current facility-administered medications for this visit.       Social History:    Family History:  Family History   Problem Relation Age  of Onset     Family History Negative Mother      Family History Negative Father      Glaucoma No family hx of      Macular Degeneration No family hx of        Previous Labs, Allergy Tests, Dermatopathology, Imaging:  none    Referred By: Elizabeth DE JESUS [facility]  [address] [place] [address]     Allergy Tests:    Past Allergy Test    Order for Future Allergy Testing:    [x] Outpatient  [] Inpatient: [facility]..../ Bed ....       Skin Atopy (atopic dermatitis) [] Yes   [x] No .........  Contact allergies:   [] Yes   [x] No ..........  Hand eczema:   [] Yes   [x] No           Leading hand:   [] R   [] L       [] Ambidextrous         Drug allergies:        [] Yes   [x] No  which?......    Urticaria/Angioedema  [] Yes   [x] No .........  Food Allergy:  [] Yes   [x] No  which?......  Pets :  [] Yes   [x] No  which?......         []  Rhinitis   [] Conjunctivitis   [] Sinusitis   [] Polyposis   [] Otitis   [] Pharyngitis         []  Postnasal drip    [x]  none  Operations:   [] Tonsils   [] Septum   [] Sinus   [] Polyposis        [] Asthma bronchiale   [] Coughing      [x]  none  Symptoms (mostly Rhinoconjunctivitis and Asthma) aggravated by:  Season   [] I   [] II   [] III   [] IV   []V   []VI   []VII   []VIII   []IX   []X   []XI   []XII     [] perennial   Day time      [] morning   [] noon      [] evening        [] night    [] whole day........  []  none  Location/changes    [] inside        [] outside   [] mountains    [] sea     [] others.............   []  none  Triggers, specific     [] animals     [] plants     [] dust              [] others ...........................    []  none  Triggers, others       [] work          [] psyche    [] sport            [] others .............................  []  none  Irritant                [] phys efforts [] smoke    [] heat/cold     [] odors  []others............... []  none    Order for PATCH TESTS  Reason for tests (suspected allergy): suspicion for allergic  contact dermatitis  Known previous allergies: none  Standardized panels  [x] Standard panel (40 tests)  [x] Preservatives & Antimicrobials (31 tests)  [x] Emulsifiers & Additives (25 tests)   [x] Perfumes/Flavours & Plants (25 tests)  [x] Hairdresser panel (12 tests)  [] Rubber Chemicals (22 tests)  [] Plastics (26 tests)  [] Colorants/Dyes/Food additives (20 tests)  [] Metals (implants/dental) (24 tests)  [] Local anaesthetics/NSAIDs (13 tests)  [] Antibiotics & Antimycotics (14 tests)   [] Corticosteroids (15 tests)   [] Photopatch test (62 tests)   [] others: ...      [] Patient's own products: ...    DO NOT test if chemical or biological identity is unknown!     always ask from patient the product information and safety sheets (MSDS)       Order for PRICK TESTS    Reason for tests (suspected allergy): atopy? No signs  Known previous allergies: none    Standardized prick panels  [] Atopic panel (20 tests)  [] Pediatric Panel (12 tests)  [] Milk, Meat, Eggs, Grains (20 tests)   [] Dust, Epithelia, Feathers (10 tests)  [] Fish, Seafood, Shellfish (17 tests)  [] Nuts, Beans (14 tests)  [] Spice, Vegetable, Fruit (17 tests)  [] Pollen Panel = Tree, Grass, Weed (24 tests)  [] Others: ...      [] Patient's own products: ...    DO NOT test if chemical or biological identity is unknown!     always ask from patient the product information and safety sheets (MSDS)     Standardized intradermal tests  [] Penicillium notatum [] Aspergillus fumigatus [] House dust mites D.far & D. pteron  [] Cat    [] dog  [] Others: ...  [] Bee venom   [] Wasp venom  !!Specific protocol with dilutions!!       Order for Drug allergy tests (prick & Intradermal & patch tests)    [] Penicillin G  [] Ampicillin [] Cefazolin   [] Ceftriaxone   [] Ceftazidime  [] Bactrim    [] Others: ...  Order for ... as test date    Atopy Screen (Placed Oct 25, 2022)  No Substance Readings (15 min) Evaluation   POS Histamine 1mg/ml ++    NEG NaCl 0.9% -      No  Substance Readings (15 min) Evaluation   1 Alternaria alternata (tenuis)  -    2 Cladosporium herbarum -    3 Aspergillus fumigatus -    4 Penicillium notatum -    5 Dermatophagoides pteronyssinus -    6 Dermatophagoides farinae -    7 Dog epithelium (canis spp) -    8 Cat hair (jazzy catus) -    9 Cockroach   (Blatella americana & germanica) -    10 Grass mix midwest   (Ann, Orchard, Redtop, Jay Jay) -    11 Stef grass (sorghum halepense) -    12 Weed mix   (common Cocklebur, Lamb s quarters, rough redroot Pigweed, Dock/Sorrel) -    13 Mug wort (artemisia vulgare) -    14 Ragweed giant/short (ambrosia spp) -    15 White birch (Betula papyrifera) -    16 Tree mix 1 (Pecan, Maple BHR, Oak RVW, american [place], black [place]) -    17 Red cedar (juniperus virginia) -    18 Tree mix 2   (white [place], river/red Birch, black [place], common [place], american Elm) -    19 Box elder/Maple mix (acer spp) -    20 [place] shagbark (carya ovata) -           Conclusion: basically all tests negatives    ________________________________    Assessment & Plan:    ==> Final Diagnosis:     # suspicion for allergic contact dermatitis on neck and trunk   DDx additives in shampoos and soap or hair dye (particularly with lesions on neck)  * chronic illness with exacerbation, progression, side effects from treatment    # some indicators for physical Urticaria  * stable chronic illness     # no signs for atopy  --> prick tests negatives    These conclusions are made at the best of one's knowledge and belief based on the provided evidence such as patient's history and allergy test results and they can change over time or can be incomplete because of missing information's.    ==> Treatment Plan:  >> try Vanicream cream and Vanicream soap and shampoo (nothing else)  >> no hair dye in the meantime  >> Mometasone ointment 2-3 times    >> every morning Allegra 180mg    Procedures Performed: Allergy tests, including prick tests    Staff:   Provider    Follow-up in Derm-Allergy clinic for patch tests as planned    I spent a total of 40 minutes with Colette Patricia. This time was spent counseling the patient and/or coordinating care, explaining the allergy tests, performing allergy tests and assessing the clinical relevance.

## 2022-10-25 ENCOUNTER — OFFICE VISIT (OUTPATIENT)
Dept: ALLERGY | Facility: CLINIC | Age: 62
End: 2022-10-25
Attending: FAMILY MEDICINE
Payer: COMMERCIAL

## 2022-10-25 ENCOUNTER — PRE VISIT (OUTPATIENT)
Dept: ALLERGY | Facility: CLINIC | Age: 62
End: 2022-10-25

## 2022-10-25 DIAGNOSIS — L23.89 ALLERGIC CONTACT DERMATITIS DUE TO OTHER AGENTS: Primary | ICD-10-CM

## 2022-10-25 DIAGNOSIS — L50.8 PHYSICAL URTICARIA: ICD-10-CM

## 2022-10-25 PROCEDURE — 95004 PERQ TESTS W/ALRGNC XTRCS: CPT | Performed by: DERMATOLOGY

## 2022-10-25 PROCEDURE — 99203 OFFICE O/P NEW LOW 30 MIN: CPT | Mod: 25 | Performed by: DERMATOLOGY

## 2022-10-25 RX ORDER — EMOLLIENT BASE
CREAM (GRAM) TOPICAL 2 TIMES DAILY
Qty: 453 G | Refills: 3 | Status: SHIPPED | OUTPATIENT
Start: 2022-10-25 | End: 2023-06-07

## 2022-10-25 RX ORDER — FEXOFENADINE HCL 180 MG/1
180 TABLET ORAL EVERY MORNING
Qty: 50 TABLET | Refills: 1 | Status: SHIPPED | OUTPATIENT
Start: 2022-10-25 | End: 2023-03-10

## 2022-10-25 RX ORDER — MOMETASONE FUROATE 1 MG/G
OINTMENT TOPICAL DAILY
Qty: 45 G | Refills: 3 | Status: SHIPPED | OUTPATIENT
Start: 2022-10-25 | End: 2023-06-07

## 2022-10-26 RX ORDER — FEXOFENADINE HYDROCHLORIDE 180 MG/1
TABLET, FILM COATED ORAL
Qty: 90 TABLET | OUTPATIENT
Start: 2022-10-26

## 2022-11-20 ENCOUNTER — HEALTH MAINTENANCE LETTER (OUTPATIENT)
Age: 62
End: 2022-11-20

## 2023-03-02 ENCOUNTER — TELEPHONE (OUTPATIENT)
Dept: ALLERGY | Facility: CLINIC | Age: 63
End: 2023-03-02

## 2023-03-02 NOTE — TELEPHONE ENCOUNTER
Standardized panels  [x]? Standard panel (40 tests)  [x]? Preservatives & Antimicrobials (31 tests)  [x]? Emulsifiers & Additives (25 tests)   [x]? Perfumes/Flavours & Plants (25 tests)  [x]? Hairdresser panel (12 tests)  []? Rubber Chemicals (22 tests)  []? Plastics (26 tests)  []? Colorants/Dyes/Food additives (20 tests)  []? Metals (implants/dental) (24 tests)  []? Local anaesthetics/NSAIDs (13 tests)  []? Antibiotics & Antimycotics (14 tests)   []? Corticosteroids (15 tests)   []? Photopatch test (62 tests)   []? others: ...    STANDARD Series                                          # Substance 2 days 4 days remarks     1 Aries Mix [C] - -       2 Colophony - -       3  2-Mercaptobenzothiazole  - -       4 Methylisothiazolinone - -       5 Carba Mix - -       6 Thiuram Mix [A] - -       7 Bisphenol A Epoxy Resin - -       8 V-Medu-Nrcxtlqsezx-Formaldehyde Resin - -       9 Mercapto Mix [A] - -       10 Black Rubber Mix- PPD [B] - -       11 Potassium Dichromate  -  -       12 Balsam of [place] (Myroxylon Pereirae Resin) - -       13 Nickel Sulphate Hexahydrate - -       14 Mixed Dialkyl Thiourea - -       15 Paraben Mix [B] - -       16 Methyldibromo Glutaronitrile - -       17 Fragrance Mix 8% - -       18 2-Bromo-2-Nitropropane-1,3-Diol (Bronopol) CT - -       19 Lyral - -       20 Tixocortol-21- Pivalate CT - -       21 Diazolidinyl urea (Germall II) - -        22 Methyl Methacrylate - -       23 Cobalt (II) Chloride Hexahydrate - -       24 Fragrance Mix II  - -       25 Compositae Mix - -       26 Benzoyl Peroxide - -       27 Bacitracin - -       28 Formaldehyde - -       29 Methylchloroisothiazolinone / Methylisothiazolinone - -       30 Corticosteroid Mix CT - -       31 Sodium Lauryl Sulfate - -       32 Lanolin Alcohol - -       33 Turpentine - -       34 Cetylstearylalcohol - -       35 Chlorhexidine Dicluconate - -       36 Budesonide - -       37 Imidazolidinyl Urea  - -       38 Ethyl-2 Cyanoacrylate  - -       39 Quaternium 15 (Dowicil 200) - -       40 Decyl Glucoside - -       PRESERVATIVES & ANTIMICROBIALS        # Substance 2 days 4 days remarks   41 1  1,2-Benzisothiazoline-3-One, Sodium Salt - -     2  1,3,5-Kasi (2-Hydroxyethyl) - Hexahydrotriazine (Grotan BK) - -     3 7-Hwozsspcmjewp-4-Nitro-1, 3-Propanediol - -     4  3, 4, 4' - Triclocarban - -    45 5 4 - Chloro - 3 - Cresol - -     6 4 - Chloro - 4 - Xylenol (PCMX) - -     7 7-Ethylbicyclooxazolidine (Bioban AC1950) - -     8 Benzalkonium Chloride CT - -     9 Benzyl Alcohol - -    50 10 Cetalkonium Chloride - -     11 Cetylpyrimidine Chloride  - -     12 Chloroacetamide - -     13 DMDM Hydantoin - -     14 Glutaraldehyde - -    55 15 Triclosan - -     16 Glyoxal Trimeric Dihydrate - -     17 Iodopropynyl Butylcarbamate - -     18 Octylisothiazoline - -     19 Bithionol CT - -    60 20 Bioban P 1487 (Nitrobutyl) Morpholine/(Ethylnitro-Trimethylene) Dimorpholine - -     21 Phenoxyethanol - -     22 Phenyl Salicylate - -     23 Povidone Iodine - -     24 Sodium Benzoate - -    65 25 Sodium Disulfite - -     26 Sorbic Acid - -     27 Thimerosal - -     28 Melamine Formaldehyde Resin - -     29 Ethylenediamine Dihydrochloride - -      Parabens      70 30 Butyl-P-Hydroxybenzoate - -     31 Ethyl-P-Hydroxybenzoate - -     32 Methyl-P-Hydroxybenzoate - -    73 33 Propyl-P-Hydroxybenzoate - -      EMULSIFIERS & ADDITIVES       # Substance 2 days 4 days remarks   74 1 Polyethylene Glycol-400 - -    75 2 Cocamidopropyl Betaine - -     3 Amerchol L101 - -     4 Propylene Glycol - -     5 Triethanolamine - -     6 Sorbitane Sesquiolate CT - -    80 7 Isopropylmyristate - -     8 Polysorbate 80 CT - -     9 Amidoamine   (Stearamidopropyl Dimethylamine) - -     10 Oleamidopropyl Dimethylamine - -     11 Lauryl Glucoside - -    85 12 Coconut Diethanolamide  - -     13 2-Hydroxy-4-Methoxy Benzophenone (Oxybenzone) - -     14 Benzophenone-4 (Sulisobenzon) - -     15  Propolis - -     16 Dexpanthenol - -    90 17 Abitol - -     18 Tert-Butylhydroquinone - -     19 Benzyl Salicylate - -     20 Dimethylaminopropylamin (DMPA) - -     21 Zinc Pyrithione (Zinc Omadine)  - -    95 22 Kasi(Hydroxymethyl) Nitromethane  - -      Antioxidant       23 Dodecyl Gallate - -     24 Butylhydroxyanisole (BHA) - -     25 Butylhydroxytoluene (BHT) - -     26 Di-Alpha-Tocopherol (Vit E) - -    100 27 Propyl Gallate - -      PERFUMES, FLAVORS & PLANTS        # Substance 2 days 4 days remarks   101 1 Benzyl Cinnamate - -     2 Di-Limonene (Dipentene) - -     3 Cananga Odorata (Yisel Morillo) (I) - -     4 Lichen Acid Mix - -    105 5 Mentha Piperita Oil (Peppermint Oil) - -     6 Sesquiterpenelactone mix - -     7 Tea Tree Oil, Oxidized - -     8 Wood Tar Mix - -     9 Abietic Acid - -    110 10 Lavendula Angustifolia Oil (Lavender Oil) - -     11 Fragrance mix II CT * 14% - -      Fragrance Mix I       12 Oakmoss Absolute - -     13 Eugenol - -     14 Geraniol - -    115 15 Hydroxycitronellal - -     16 Isoeugenol - -     17 Cinnamic Aldehyde - -     18 Cinnamic Alcohol  - -      Fragrance mix II       19 Citronellol - -    120 20 Alpha-Hexylcinnamic Aldehyde    - -     21 Citral - -     22 Farnesol - -    123 23 Coumarin - -      Hexylcinnamic aldehyde, Coumarin, Farnesol, Hydroxyisohexy3-cyclohexene carboxaldehyde, citral, citrolellol  HAIRDRESSER        # Substance 2 days 4 days remarks   124 1 P-Phenylenediamine -  -    125 2 P-Toluenediamine Sulphate  -  -     3 Ammonium Thioglycolate - -     4 Ammonium Persulfate - -     5 Resorcinol - -     6 3-Aminophenol - -    130 7 P-Aminophenol - -     8 Glyceryl Monothioglycolate - -    132 9 Pyrogallol - -      Results of patch tests:                         Interpretation:  - Negative                    A    = Allergic      (+) Erythema    TI   = Toxic/irritant   + E + Infiltration    RaP = Relevance at Present     ++ E/I + Papulovesicle   Rpr  =  Relevance Previously     +++ E/I/P + Blister     nR   = No Relevance

## 2023-03-05 NOTE — PROGRESS NOTES
[facility] Dermato-allergology Note  Office visit  Encounter Date: Mar 6, 2023  ____________________________________________    CC: No chief complaint on file.      HPI:  (Mar 6, 2023)  Ms. Colette Patricia is a(n) 63 year old female who presents today as a return patient for allergy tests as planned  - Follow-up in Derm-Allergy clinic for patch tests as planned   - otherwise feeling well in usual state of health    Physical exam:  General: In no acute distress, well-developed, well-nourished  Eyes: no conjunctivitis  ENT: no signs of rhinitis   Pulmonary: no wheezing or coughing  Skin: Focused examination of the skin on test sites was performed = see test results below  No active eczematous skin lesions on tests sites, particularly back    Earlier History and Allergy exams:  (Oct 25, 2022)  - patient has an itchy rash on neck and shoulder and flank area  - problems since January  Skin: over the neck and upper shoulder area typical subacute eczematous lesions and some nummuar lesions on trunk and less extremities.     Past Medical History:   Patient Active Problem List   Diagnosis     Headache     Insomnia     Generalized osteoarthrosis, unspecified site     Other unspecified back disorder     Low back pain     GERD (gastroesophageal reflux disease)     Advanced directives, counseling/discussion     Mixed hyperlipidemia     Hyperlipidemia with target LDL less than 130     Gastroesophageal reflux disease, esophagitis presence not specified     Chronic left-sided low back pain without sciatica     Gastroesophageal reflux disease without esophagitis     Foraminal stenosis of lumbosacral region     Fatigue, unspecified type     Vitamin D deficiency     Osteopenia of spine     Past Medical History:   Diagnosis Date     Other unspecified back disorder        Allergies:  Allergies   Allergen Reactions     No Known Drug Allergies        Medications:  Current Outpatient Medications   Medication      emollient (VANICREAM) external cream     fexofenadine (ALLEGRA) 180 MG tablet     HYDROcodone-acetaminophen (NORCO) 5-325 MG tablet     mometasone (ELOCON) 0.1 % external ointment     pantoprazole (PROTONIX) 40 MG EC tablet     triamcinolone (ARISTOCORT HP) 0.5 % external cream     No current facility-administered medications for this visit.       Social History:    Family History:  Family History   Problem Relation Age of Onset     Family History Negative Mother      Family History Negative Father      Glaucoma No family hx of      Macular Degeneration No family hx of        Previous Labs, Allergy Tests, Dermatopathology, Imaging:  none    Referred By: Elizabeth Sarah  [facility]  [address] [address]     Allergy Tests:    Past Allergy Test    Order for Future Allergy Testing:    [x] Outpatient  [] Inpatient: [facility]..../ Bed ....       Skin Atopy (atopic dermatitis) [] Yes   [x] No .........  Contact allergies:   [] Yes   [x] No ..........  Hand eczema:   [] Yes   [x] No           Leading hand:   [] R   [] L       [] Ambidextrous         Drug allergies:        [] Yes   [x] No  which?......    Urticaria/Angioedema  [] Yes   [x] No .........  Food Allergy:  [] Yes   [x] No  which?......  Pets :  [] Yes   [x] No  which?......         []  Rhinitis   [] Conjunctivitis   [] Sinusitis   [] Polyposis   [] Otitis   [] Pharyngitis         []  Postnasal drip    [x]  none  Operations:   [] Tonsils   [] Septum   [] Sinus   [] Polyposis        [] Asthma bronchiale   [] Coughing      [x]  none  Symptoms (mostly Rhinoconjunctivitis and Asthma) aggravated by:  Season   [] I   [] II   [] III   [] IV   []V   []VI   []VII   []VIII   []IX   []X   []XI   []XII     [] perennial   Day time      [] morning   [] noon      [] evening        [] night    [] whole day........  []  none  Location/changes    [] inside        [] outside   [] mountains    [] sea     [] others.............   []  none  Triggers, specific      [] animals     [] plants     [] dust              [] others ...........................    []  none  Triggers, others       [] work          [] psyche    [] sport            [] others .............................  []  none  Irritant                [] phys efforts [] smoke    [] heat/cold     [] odors  []others............... []  none    Order for PATCH TESTS  Reason for tests (suspected allergy): suspicion for allergic contact dermatitis  Known previous allergies: none  Standardized panels  [x] Standard panel (40 tests)  [x] Preservatives & Antimicrobials (31 tests)  [x] Emulsifiers & Additives (25 tests)   [x] Perfumes/Flavours & Plants (25 tests)  [x] Hairdresser panel (12 tests)  [] Rubber Chemicals (22 tests)  [] Plastics (26 tests)  [] Colorants/Dyes/Food additives (20 tests)  [] Metals (implants/dental) (24 tests)  [] Local anaesthetics/NSAIDs (13 tests)  [] Antibiotics & Antimycotics (14 tests)   [] Corticosteroids (15 tests)   [] Photopatch test (62 tests)   [] others: ...      [] Patient's own products: ...    DO NOT test if chemical or biological identity is unknown!     always ask from patient the product information and safety sheets (MSDS)       Order for PRICK TESTS    Reason for tests (suspected allergy): atopy? No signs  Known previous allergies: none    Standardized prick panels  [x] Atopic panel (20 tests)  [] Pediatric Panel (12 tests)  [] Milk, Meat, Eggs, Grains (20 tests)   [] Dust, Epithelia, Feathers (10 tests)  [] Fish, Seafood, Shellfish (17 tests)  [] Nuts, Beans (14 tests)  [] Spice, Vegetable, Fruit (17 tests)  [] Pollen Panel = Tree, Grass, Weed (24 tests)  [] Others: ...      [] Patient's own products: ...    DO NOT test if chemical or biological identity is unknown!     always ask from patient the product information and safety sheets (MSDS)     Standardized intradermal tests  [] Penicillium notatum [] Aspergillus fumigatus [] House dust mites D.far & GRETTA pteron  [] Cat    [] dog  []  Others: ...  [] Bee venom   [] Wasp venom  !!Specific protocol with dilutions!!       Order for Drug allergy tests (prick & Intradermal & patch tests)    [] Penicillin G  [] Ampicillin [] Cefazolin   [] Ceftriaxone   [] Ceftazidime  [] Bactrim    [] Others: ...  Order for ... as test date    Atopy Screen (Placed Oct 25, 2022)  No Substance Readings (15 min) Evaluation   POS Histamine 1mg/ml ++    NEG NaCl 0.9% -      No Substance Readings (15 min) Evaluation   1 Alternaria alternata (tenuis)  -    2 Cladosporium herbarum -    3 Aspergillus fumigatus -    4 Penicillium notatum -    5 Dermatophagoides pteronyssinus -    6 Dermatophagoides farinae -    7 Dog epithelium (canis spp) -    8 Cat hair (jazzy catus) -    9 Cockroach   (Blatella americana & germanica) -    10 Grass mix midwest   (Ann, Orchard, Redtop, Jay Jay) -    11 Stef grass (sorghum halepense) -    12 Weed mix   (common Cocklebur, Lamb s quarters, rough redroot Pigweed, Dock/Sorrel) -    13 Mug wort (artemisia vulgare) -    14 Ragweed giant/short (ambrosia spp) -    15 White birch (Betula papyrifera) -    16 Tree mix 1 (Pecan, Maple BHR, Oak RVW, american Hay, black [place]) -    17 Red cedar (juniperus virginia) -    18 Tree mix 2   (white [place], river/red Birch, black [place], common [place], american Elm) -    19 Box elder/Maple mix (acer spp) -    20 Abie shagbark (carya ovata) -           Conclusion: basically all tests negatives    Fish and Seafood (Placed Mar 6, 2023)   No Substance Readings (15min) Evaluation   POS Histamine 1mg/ml ++    NEG NaCl 0.9% -      No Substance Readings (15min) Evaluation   1 Codfish (gadus morhua) +/++    2 Flounder (platichthys spp) -    3 Tuna (thunnus albecarus) -    4 Bass (centropristis striata) -    5 Mackerel (scomberomorus cavalla) -    6 Halibut (hipoglossus hipoglossus) -    7 [place] (salmo jose) -    8 Catfish (ictalurus spp) -    9 Perch (serranus scriba) -    10 [place], [place] (oncorhynchus  mykiss) -    11 Crab (callinectes spp) -    12 Lobster (homarus americanus) -    13 Shrimp white/brown/pink (farfantepenaeus&litopenaeus) -    14 Kingcrab (paralithodes camtschatica) -    15 Scallop (placopecten magellanicus) -    16 Clam (mercenaria mercenaria) -    17 Oyster (cstrea/crassostrea) -    Conclusion: some reaction to the cod fish, but not to other fish and shellfish    Milk, Meat, Egg, and Grains (Placed Mar 10?, 2023)  No Substance Readings (15min) Evaluation   POS Histamine 1mg/ml -    NEG NaCl 0.9% -      No Substance Readings (15min) Evaluation   1 Cows milk whole (billie zina) -    2 Casein (billie zina) -    3 Beef (billie zina) -    4 Goat milk -    5 Lamb (ovis aakash) -    6 Egg white (gallus gallus) -    7 Egg yolk (gallus gallus) -    8 Chicken (gallus gallus) -    9 Turkey (meleagris gallopavo) -    10 Pork (silvestre scrofa) -    11 Barley (hordeum vulgare) -    12 Buckwheat (fagopyrum esculentum) -    13 Corn (elmer mais) -    14 Hops (humulus lupulus)  -    15 Malt (hordeum vulgare)  -    16 Oat (tita sativa) -    17 Rice (oryza sativa) -    18 Rye (secale cereale) -    19 Whole wheat (triticum aestivum) -    20  White wheat flour - Patients own      Conclusion:     ________________________________    RESULTS & EVALUATION of PATCH TESTS    Mar 6, 2023 application of patch tests:    Patch test readings after     [x] 2 days, [] 3 days [x] 4 days, [] 5 days,  Other duration: ...      STANDARD Series                                          # Substance 2 days 4 days remarks     1 Aries Mix [C] - -       2 Colophony - -       3  2-Mercaptobenzothiazole  - -       4 Methylisothiazolinone - -       5 Carba Mix - -       6 Thiuram Mix [A] - -       7 Bisphenol A Epoxy Resin - -       8 B-Ioqn-Xyxuxiwosmb-Formaldehyde Resin - -       9 Mercapto Mix [A] - -       10 Black Rubber Mix- PPD [B] - -       11 Potassium Dichromate  -  -       12 Balsam of [place] (Myroxylon Pereirae Resin) - -       13 Nickel  Sulphate Hexahydrate - -       14 Mixed Dialkyl Thiourea - -       15 Paraben Mix [B] - -       16 Methyldibromo Glutaronitrile - -       17 Fragrance Mix 8% - -       18 2-Bromo-2-Nitropropane-1,3-Diol (Bronopol) CT NA NA       19 Lyral - -       20 Tixocortol-21- Pivalate CT - -       21 Diazolidinyl urea (Germall II) - -        22 Methyl Methacrylate - -       23 Cobalt (II) Chloride Hexahydrate - -       24 Fragrance Mix II  - -       25 Compositae Mix - -       26 Benzoyl Peroxide - -       27 Bacitracin - -       28 Formaldehyde - -       29 Methylchloroisothiazolinone / Methylisothiazolinone - -       30 Corticosteroid Mix CT NA NA       31 Sodium Lauryl Sulfate - -       32 Lanolin Alcohol - -       33 Turpentine - -       34 Cetylstearylalcohol - -       35 Chlorhexidine Dicluconate - -       36 Budesonide - -       37 Imidazolidinyl Urea  - -       38 Ethyl-2 Cyanoacrylate - -       39 Quaternium 15 (Dowicil 200) - -       40 Decyl Glucoside - -       PRESERVATIVES & ANTIMICROBIALS        # Substance 2 days 4 days remarks   41 1  1,2-Benzisothiazoline-3-One, Sodium Salt - -     2  1,3,5-Kasi (2-Hydroxyethyl) - Hexahydrotriazine (Grotan BK) - -     3 4-Iqtrpoughweui-4-Nitro-1, 3-Propanediol - -     4  3, 4, 4' - Triclocarban - -    45 5 4 - Chloro - 3 - Cresol - -     6 4 - Chloro - 4 - Xylenol (PCMX) - -     7 7-Ethylbicyclooxazolidine (Bioban PB4386) - -     8 Benzalkonium Chloride CT - -     9 Benzyl Alcohol - -    50 10 Cetalkonium Chloride - -     11 Cetylpyrimidine Chloride  - -     12 Chloroacetamide - -     13 DMDM Hydantoin - -     14 Glutaraldehyde - -    55 15 Triclosan - -     16 Glyoxal Trimeric Dihydrate - -     17 Iodopropynyl Butylcarbamate - -     18 Octylisothiazoline - -     19 Bithionol CT - -    60 20 Bioban P 1487 (Nitrobutyl) Morpholine/(Ethylnitro-Trimethylene) Dimorpholine - -     21 Phenoxyethanol - -     22 Phenyl Salicylate - -     23 Povidone Iodine - -     24 Sodium Benzoate -  -    65 25 Sodium Disulfite - -     26 Sorbic Acid - -     27 Thimerosal - -     28 Melamine Formaldehyde Resin - -     29 Ethylenediamine Dihydrochloride - -      Parabens      70 30 Butyl-P-Hydroxybenzoate - -     31 Ethyl-P-Hydroxybenzoate NA NA     32 Methyl-P-Hydroxybenzoate - -    73 33 Propyl-P-Hydroxybenzoate - -      EMULSIFIERS & ADDITIVES       # Substance 2 days 4 days remarks   74 1 Polyethylene Glycol-400 - -    75 2 Cocamidopropyl Betaine - -     3 Amerchol L101 - -     4 Propylene Glycol - -     5 Triethanolamine - -     6 Sorbitane Sesquiolate CT - -    80 7 Isopropylmyristate - -     8 Polysorbate 80 CT - -     9 Amidoamine   (Stearamidopropyl Dimethylamine) - -     10 Oleamidopropyl Dimethylamine - -     11 Lauryl Glucoside - -    85 12 Coconut Diethanolamide  - -     13 2-Hydroxy-4-Methoxy Benzophenone (Oxybenzone) - -     14 Benzophenone-4 (Sulisobenzon) - -     15 Propolis - -     16 Dexpanthenol - -    90 17 Abitol - -     18 Tert-Butylhydroquinone - -     19 Benzyl Salicylate - -     20 Dimethylaminopropylamin (DMPA) - -     21 Zinc Pyrithione (Zinc Omadine)  - -    95 22 Kasi(Hydroxymethyl) Nitromethane  - -      Antioxidant       23 Dodecyl Gallate - -     24 Butylhydroxyanisole (BHA) - -     25 Butylhydroxytoluene (BHT) - -     26 Di-Alpha-Tocopherol (Vit E) - -    100 27 Propyl Gallate - -      PERFUMES, FLAVORS & PLANTS        # Substance 2 days 4 days remarks   101 1 Benzyl Cinnamate - -     2 Di-Limonene (Dipentene) - -     3 Cananga Odorata (Ylang Ylang) (I) - -     4 Lichen Acid Mix - -    105 5 Mentha Piperita Oil (Peppermint Oil) - -     6 Sesquiterpenelactone mix - -     7 Tea Tree Oil, Oxidized - -     8 Wood Tar Mix - -     9 Abietic Acid NA NA    110 10 Lavendula Angustifolia Oil (Lavender Oil) - -     11 Fragrance mix II CT * 14% - -      Fragrance Mix I       12 Oakmoss Absolute - -     13 Eugenol - -     14 Geraniol - -    115 15 Hydroxycitronellal - -     16 Isoeugenol - -      17 Cinnamic Aldehyde - -     18 Cinnamic Alcohol  - -      Fragrance mix II       19 Citronellol NA NA    120 20 Alpha-Hexylcinnamic Aldehyde    - -     21 Citral - -     22 Farnesol - -    123 23 Coumarin - -      Hexylcinnamic aldehyde, Coumarin, Farnesol, Hydroxyisohexy3-cyclohexene carboxaldehyde, citral, citrolellol  HAIRDRESSER        # Substance 2 days 4 days remarks   124 1 P-Phenylenediamine -  -    125 2 P-Toluenediamine Sulphate  -  -     3 Ammonium Thioglycolate - -     4 Ammonium Persulfate - -     5 Resorcinol - -     6 3-Aminophenol - -    130 7 P-Aminophenol - -     8 Glyceryl Monothioglycolate - -    132 9 Pyrogallol - -      Results of patch tests:                         Interpretation:  - Negative                    A    = Allergic      (+) Erythema    TI   = Toxic/irritant   + E + Infiltration    RaP = Relevance at Present     ++ E/I + Papulovesicle   Rpr  = Relevance Previously     +++ E/I/P + Blister     nR   = No Relevance      [] No relevant allergic reaction observed    [] Allergic reaction diagnosed against following allergens:      Interpretation/ remarks:   See later    [] Patient information given   [] ACDS CAMP information's (# ....) to following compounds: .....   [] General information's to following compounds: ......      Assessment & Plan:    ==> Final Diagnosis:     # suspicion for allergic contact dermatitis on neck and trunk   DDx additives in shampoos and soap or hair dye (particularly with lesions on neck)  * chronic illness with exacerbation, progression, side effects from treatment    # some indicators for physical Urticaria  >> think that it can be aggravated by fish and shellfish  * stable chronic illness     # no signs for atopy  >> in prick test some signs for immediate type reaction to cod fish  --> prick tests negatives    These conclusions are made at the best of one's knowledge and belief based on the provided evidence such as patient's history and allergy test  results and they can change over time or can be incomplete because of missing information's.    ==> Treatment Plan:  >> try Vanicream cream and Vanicream soap and shampoo (nothing else)  >> no hair dye in the meantime  >> Mometasone ointment 2-3 times    >> every morning Allegra 180mg     Procedures Performed: Allergy tests, including prick and patch tests    Staff: : provider    Follow-up in Derm-Allergy clinic for 1st readings of patch tests after 2 days and 2nd readings and final conclusions after 4 days   ___________________________    I spent a total of 40 minutes with Colette Patricia during today s  visit. This time was spent discussing all the individual test results, correlating them to the clinical relevance, counseling the patient and/or coordinating care and performing allergy tests

## 2023-03-06 ENCOUNTER — OFFICE VISIT (OUTPATIENT)
Dept: ALLERGY | Facility: CLINIC | Age: 63
End: 2023-03-06
Payer: COMMERCIAL

## 2023-03-06 DIAGNOSIS — Z91.018 FOOD ALLERGY: ICD-10-CM

## 2023-03-06 DIAGNOSIS — L23.89 ALLERGIC CONTACT DERMATITIS DUE TO OTHER AGENTS: ICD-10-CM

## 2023-03-06 DIAGNOSIS — L50.8 PHYSICAL URTICARIA: Primary | ICD-10-CM

## 2023-03-06 PROCEDURE — 95004 PERQ TESTS W/ALRGNC XTRCS: CPT | Performed by: DERMATOLOGY

## 2023-03-06 PROCEDURE — 95044 PATCH/APPLICATION TESTS: CPT | Performed by: DERMATOLOGY

## 2023-03-06 NOTE — NURSING NOTE
Chief Complaint   Patient presents with     Allergy Testing Followup     Patch testing day 1     Kennedi BRAVO RN

## 2023-03-06 NOTE — LETTER
3/6/2023         RE: Colette Patricia  [address]  [place] [address]        Dear Colleague,    Thank you for referring your patient, Colette Patricia, to the [facility] [place]. Please see a copy of my visit note below.    [facility] Dermato-allergology Note  Office visit  Encounter Date: Mar 6, 2023  ____________________________________________    CC: No chief complaint on file.      HPI:  (Mar 6, 2023)  Ms. Colette Patricia is a(n) 63 year old female who presents today as a return patient for allergy tests as planned  - Follow-up in Derm-Allergy clinic for patch tests as planned   - otherwise feeling well in usual state of health    Physical exam:  General: In no acute distress, well-developed, well-nourished  Eyes: no conjunctivitis  ENT: no signs of rhinitis   Pulmonary: no wheezing or coughing  Skin: Focused examination of the skin on test sites was performed = see test results below  No active eczematous skin lesions on tests sites, particularly back    Earlier History and Allergy exams:  (Oct 25, 2022)  - patient has an itchy rash on neck and shoulder and flank area  - problems since January  Skin: over the neck and upper shoulder area typical subacute eczematous lesions and some nummuar lesions on trunk and less extremities.     Past Medical History:   Patient Active Problem List   Diagnosis     Headache     Insomnia     Generalized osteoarthrosis, unspecified site     Other unspecified back disorder     Low back pain     GERD (gastroesophageal reflux disease)     Advanced directives, counseling/discussion     Mixed hyperlipidemia     Hyperlipidemia with target LDL less than 130     Gastroesophageal reflux disease, esophagitis presence not specified     Chronic left-sided low back pain without sciatica     Gastroesophageal reflux disease without esophagitis     Foraminal stenosis of lumbosacral region     Fatigue, unspecified type     Vitamin D deficiency      Osteopenia of spine     Past Medical History:   Diagnosis Date     Other unspecified back disorder        Allergies:  Allergies   Allergen Reactions     No Known Drug Allergies        Medications:  Current Outpatient Medications   Medication     emollient (VANICREAM) external cream     fexofenadine (ALLEGRA) 180 MG tablet     HYDROcodone-acetaminophen (NORCO) 5-325 MG tablet     mometasone (ELOCON) 0.1 % external ointment     pantoprazole (PROTONIX) 40 MG EC tablet     triamcinolone (ARISTOCORT HP) 0.5 % external cream     No current facility-administered medications for this visit.       Social History:    Family History:  Family History   Problem Relation Age of Onset     Family History Negative Mother      Family History Negative Father      Glaucoma No family hx of      Macular Degeneration No family hx of        Previous Labs, Allergy Tests, Dermatopathology, Imaging:  none    Referred By: Elizabeth DE JESUS [facility]  [address] [place] [address]     Allergy Tests:    Past Allergy Test    Order for Future Allergy Testing:    [x] Outpatient  [] Inpatient: [facility]..../ Bed ....       Skin Atopy (atopic dermatitis) [] Yes   [x] No .........  Contact allergies:   [] Yes   [x] No ..........  Hand eczema:   [] Yes   [x] No           Leading hand:   [] R   [] L       [] Ambidextrous         Drug allergies:        [] Yes   [x] No  which?......    Urticaria/Angioedema  [] Yes   [x] No .........  Food Allergy:  [] Yes   [x] No  which?......  Pets :  [] Yes   [x] No  which?......         []  Rhinitis   [] Conjunctivitis   [] Sinusitis   [] Polyposis   [] Otitis   [] Pharyngitis         []  Postnasal drip    [x]  none  Operations:   [] Tonsils   [] Septum   [] Sinus   [] Polyposis        [] Asthma bronchiale   [] Coughing      [x]  none  Symptoms (mostly Rhinoconjunctivitis and Asthma) aggravated by:  Season   [] I   [] II   [] III   [] IV   []V   []VI   []VII   []VIII   []IX   []X   []XI   []XII     []  perennial   Day time      [] morning   [] noon      [] evening        [] night    [] whole day........  []  none  Location/changes    [] inside        [] outside   [] mountains    [] sea     [] others.............   []  none  Triggers, specific     [] animals     [] plants     [] dust              [] others ...........................    []  none  Triggers, others       [] work          [] psyche    [] sport            [] others .............................  []  none  Irritant                [] phys efforts [] smoke    [] heat/cold     [] odors  []others............... []  none    Order for PATCH TESTS  Reason for tests (suspected allergy): suspicion for allergic contact dermatitis  Known previous allergies: none  Standardized panels  [x] Standard panel (40 tests)  [x] Preservatives & Antimicrobials (31 tests)  [x] Emulsifiers & Additives (25 tests)   [x] Perfumes/Flavours & Plants (25 tests)  [x] Hairdresser panel (12 tests)  [] Rubber Chemicals (22 tests)  [] Plastics (26 tests)  [] Colorants/Dyes/Food additives (20 tests)  [] Metals (implants/dental) (24 tests)  [] Local anaesthetics/NSAIDs (13 tests)  [] Antibiotics & Antimycotics (14 tests)   [] Corticosteroids (15 tests)   [] Photopatch test (62 tests)   [] others: ...      [] Patient's own products: ...    DO NOT test if chemical or biological identity is unknown!     always ask from patient the product information and safety sheets (MSDS)       Order for PRICK TESTS    Reason for tests (suspected allergy): atopy? No signs  Known previous allergies: none    Standardized prick panels  [x] Atopic panel (20 tests)  [] Pediatric Panel (12 tests)  [] Milk, Meat, Eggs, Grains (20 tests)   [] Dust, Epithelia, Feathers (10 tests)  [] Fish, Seafood, Shellfish (17 tests)  [] Nuts, Beans (14 tests)  [] Spice, Vegetable, Fruit (17 tests)  [] Pollen Panel = Tree, Grass, Weed (24 tests)  [] Others: ...      [] Patient's own products: ...    DO NOT test if chemical or  biological identity is unknown!     always ask from patient the product information and safety sheets (MSDS)     Standardized intradermal tests  [] Penicillium notatum [] Aspergillus fumigatus [] House dust mites D.far & D. pteron  [] Cat    [] dog  [] Others: ...  [] Bee venom   [] Wasp venom  !!Specific protocol with dilutions!!       Order for Drug allergy tests (prick & Intradermal & patch tests)    [] Penicillin G  [] Ampicillin [] Cefazolin   [] Ceftriaxone   [] Ceftazidime  [] Bactrim    [] Others: ...  Order for ... as test date    Atopy Screen (Placed Oct 25, 2022)  No Substance Readings (15 min) Evaluation   POS Histamine 1mg/ml ++    NEG NaCl 0.9% -      No Substance Readings (15 min) Evaluation   1 Alternaria alternata (tenuis)  -    2 Cladosporium herbarum -    3 Aspergillus fumigatus -    4 Penicillium notatum -    5 Dermatophagoides pteronyssinus -    6 Dermatophagoides farinae -    7 Dog epithelium (canis spp) -    8 Cat hair (jazzy catus) -    9 Cockroach   (Blatella americana & germanica) -    10 Grass mix midwest   (Ann, Orchard, Redtop, Jay Jay) -    11 Stef grass (sorghum halepense) -    12 Weed mix   (common Cocklebur, Lamb s quarters, rough redroot Pigweed, Dock/Sorrel) -    13 Mug wort (artemisia vulgare) -    14 Ragweed giant/short (ambrosia spp) -    15 White birch (Betula papyrifera) -    16 Tree mix 1 (Pecan, Maple BHR, Oak RVW, american [place], black [place]) -    17 Red cedar (juniperus virginia) -    18 Tree mix 2   (white [place], river/red Birch, black [place], common [place], american Elm) -    19 Box elder/Maple mix (acer spp) -    20 Patillas shagbark (carya ovata) -           Conclusion: basically all tests negatives    Fish and Seafood (Placed Mar 6, 2023)   No Substance Readings (15min) Evaluation   POS Histamine 1mg/ml ++    NEG NaCl 0.9% -      No Substance Readings (15min) Evaluation   1 Codfish (gadus morhua) +/++    2 Flounder (platichthys spp) -    3 Tuna (thunnus  albecarus) -    4 Bass (centropristis striata) -    5 Mackerel (scomberomorus cavalla) -    6 Halibut (hipoglossus hipoglossus) -    7 [place] (salmo jose) -    8 Catfish (ictalurus spp) -    9 Perch (serranus scriba) -    10 [place], [place] (oncorhynchus mykiss) -    11 Crab (callinectes spp) -    12 Lobster (homarus americanus) -    13 Shrimp white/brown/pink (farfantepenaeus&litopenaeus) -    14 Kingcrab (paralithodes camtschatica) -    15 Scallop (placopecten magellanicus) -    16 Clam (mercenaria mercenaria) -    17 Oyster (cstrea/crassostrea) -    Conclusion: some reaction to the cod fish, but not to other fish and shellfish    Milk, Meat, Egg, and Grains (Placed Mar 10?, 2023)  No Substance Readings (15min) Evaluation   POS Histamine 1mg/ml -    NEG NaCl 0.9% -      No Substance Readings (15min) Evaluation   1 Cows milk whole (billie zina) -    2 Casein (billie zina) -    3 Beef (billie zina) -    4 Goat milk -    5 Lamb (ovis aakash) -    6 Egg white (gallus gallus) -    7 Egg yolk (gallus gallus) -    8 Chicken (gallus gallus) -    9 Turkey (meleagris gallopavo) -    10 Pork (silvestre scrofa) -    11 Barley (hordeum vulgare) -    12 Buckwheat (fagopyrum esculentum) -    13 Corn (elmer mais) -    14 Hops (humulus lupulus)  -    15 Malt (hordeum vulgare)  -    16 Oat (tita sativa) -    17 Rice (oryza sativa) -    18 Rye (secale cereale) -    19 Whole wheat (triticum aestivum) -    20  White wheat flour - Patients own      Conclusion:     ________________________________    RESULTS & EVALUATION of PATCH TESTS    Mar 6, 2023 application of patch tests:    Patch test readings after     [x] 2 days, [] 3 days [x] 4 days, [] 5 days,  Other duration: ...      STANDARD Series                                          # Substance 2 days 4 days remarks     1 Aries Mix [C] - -       2 Colophony - -       3  2-Mercaptobenzothiazole  - -       4 Methylisothiazolinone - -       5 Carba Mix - -       6 Thiuram Mix [A] - -       7  Bisphenol A Epoxy Resin - -       8 L-Zjer-Esgdpfketwv-Formaldehyde Resin - -       9 Mercapto Mix [A] - -       10 Black Rubber Mix- PPD [B] - -       11 Potassium Dichromate  -  -       12 Balsam of [place] (Myroxylon Pereirae Resin) - -       13 Nickel Sulphate Hexahydrate - -       14 Mixed Dialkyl Thiourea - -       15 Paraben Mix [B] - -       16 Methyldibromo Glutaronitrile - -       17 Fragrance Mix 8% - -       18 2-Bromo-2-Nitropropane-1,3-Diol (Bronopol) CT NA NA       19 Lyral - -       20 Tixocortol-21- Pivalate CT - -       21 Diazolidinyl urea (Germall II) - -        22 Methyl Methacrylate - -       23 Cobalt (II) Chloride Hexahydrate - -       24 Fragrance Mix II  - -       25 Compositae Mix - -       26 Benzoyl Peroxide - -       27 Bacitracin - -       28 Formaldehyde - -       29 Methylchloroisothiazolinone / Methylisothiazolinone - -       30 Corticosteroid Mix CT NA NA       31 Sodium Lauryl Sulfate - -       32 Lanolin Alcohol - -       33 Turpentine - -       34 Cetylstearylalcohol - -       35 Chlorhexidine Dicluconate - -       36 Budesonide - -       37 Imidazolidinyl Urea  - -       38 Ethyl-2 Cyanoacrylate - -       39 Quaternium 15 (Dowicil 200) - -       40 Decyl Glucoside - -       PRESERVATIVES & ANTIMICROBIALS        # Substance 2 days 4 days remarks   41 1  1,2-Benzisothiazoline-3-One, Sodium Salt - -     2  1,3,5-Kasi (2-Hydroxyethyl) - Hexahydrotriazine (Grotan BK) - -     3 4-Yggcrxbfuuitk-9-Nitro-1, 3-Propanediol - -     4  3, 4, 4' - Triclocarban - -    45 5 4 - Chloro - 3 - Cresol - -     6 4 - Chloro - 4 - Xylenol (PCMX) - -     7 7-Ethylbicyclooxazolidine (Bioban EI5496) - -     8 Benzalkonium Chloride CT - -     9 Benzyl Alcohol - -    50 10 Cetalkonium Chloride - -     11 Cetylpyrimidine Chloride  - -     12 Chloroacetamide - -     13 DMDM Hydantoin - -     14 Glutaraldehyde - -    55 15 Triclosan - -     16 Glyoxal Trimeric Dihydrate - -     17 Iodopropynyl Butylcarbamate -  -     18 Octylisothiazoline - -     19 Bithionol CT - -    60 20 Bioban P 1487 (Nitrobutyl) Morpholine/(Ethylnitro-Trimethylene) Dimorpholine - -     21 Phenoxyethanol - -     22 Phenyl Salicylate - -     23 Povidone Iodine - -     24 Sodium Benzoate - -    65 25 Sodium Disulfite - -     26 Sorbic Acid - -     27 Thimerosal - -     28 Melamine Formaldehyde Resin - -     29 Ethylenediamine Dihydrochloride - -      Parabens      70 30 Butyl-P-Hydroxybenzoate - -     31 Ethyl-P-Hydroxybenzoate NA NA     32 Methyl-P-Hydroxybenzoate - -    73 33 Propyl-P-Hydroxybenzoate - -      EMULSIFIERS & ADDITIVES       # Substance 2 days 4 days remarks   74 1 Polyethylene Glycol-400 - -    75 2 Cocamidopropyl Betaine - -     3 Amerchol L101 - -     4 Propylene Glycol - -     5 Triethanolamine - -     6 Sorbitane Sesquiolate CT - -    80 7 Isopropylmyristate - -     8 Polysorbate 80 CT - -     9 Amidoamine   (Stearamidopropyl Dimethylamine) - -     10 Oleamidopropyl Dimethylamine - -     11 Lauryl Glucoside - -    85 12 Coconut Diethanolamide  - -     13 2-Hydroxy-4-Methoxy Benzophenone (Oxybenzone) - -     14 Benzophenone-4 (Sulisobenzon) - -     15 Propolis - -     16 Dexpanthenol - -    90 17 Abitol - -     18 Tert-Butylhydroquinone - -     19 Benzyl Salicylate - -     20 Dimethylaminopropylamin (DMPA) - -     21 Zinc Pyrithione (Zinc Omadine)  - -    95 22 Kasi(Hydroxymethyl) Nitromethane  - -      Antioxidant       23 Dodecyl Gallate - -     24 Butylhydroxyanisole (BHA) - -     25 Butylhydroxytoluene (BHT) - -     26 Di-Alpha-Tocopherol (Vit E) - -    100 27 Propyl Gallate - -      PERFUMES, FLAVORS & PLANTS        # Substance 2 days 4 days remarks   101 1 Benzyl Cinnamate - -     2 Di-Limonene (Dipentene) - -     3 Cananga Odorata (Yisel Morillo) (I) - -     4 Lichen Acid Mix - -    105 5 Mentha Piperita Oil (Peppermint Oil) - -     6 Sesquiterpenelactone mix - -     7 Tea Tree Oil, Oxidized - -     8 Wood Tar Mix - -     9  Abietic Acid NA NA    110 10 Lavendula Angustifolia Oil (Lavender Oil) - -     11 Fragrance mix II CT * 14% - -      Fragrance Mix I       12 [place] Absolute - -     13 Eugenol - -     14 Geraniol - -    115 15 Hydroxycitronellal - -     16 Isoeugenol - -     17 Cinnamic Aldehyde - -     18 Cinnamic Alcohol  - -      Fragrance mix II       19 Citronellol NA NA    120 20 Alpha-Hexylcinnamic Aldehyde    - -     21 Citral - -     22 Farnesol - -    123 23 Coumarin - -      Hexylcinnamic aldehyde, Coumarin, Farnesol, Hydroxyisohexy3-cyclohexene carboxaldehyde, citral, citrolellol  HAIRDRESSER        # Substance 2 days 4 days remarks   124 1 P-Phenylenediamine -  -    125 2 P-Toluenediamine Sulphate  -  -     3 Ammonium Thioglycolate - -     4 Ammonium Persulfate - -     5 Resorcinol - -     6 3-Aminophenol - -    130 7 P-Aminophenol - -     8 Glyceryl Monothioglycolate - -    132 9 Pyrogallol - -      Results of patch tests:                         Interpretation:  - Negative                    A    = Allergic      (+) Erythema    TI   = Toxic/irritant   + E + Infiltration    RaP = Relevance at Present     ++ E/I + Papulovesicle   Rpr  = Relevance Previously     +++ E/I/P + Blister     nR   = No Relevance      [] No relevant allergic reaction observed    [] Allergic reaction diagnosed against following allergens:      Interpretation/ remarks:   See later    [] Patient information given   [] ACDS CAMP information's (# ....) to following compounds: .....   [] General information's to following compounds: ......      Assessment & Plan:    ==> Final Diagnosis:     # suspicion for allergic contact dermatitis on neck and trunk   DDx additives in shampoos and soap or hair dye (particularly with lesions on neck)  * chronic illness with exacerbation, progression, side effects from treatment    # some indicators for physical Urticaria  >> think that it can be aggravated by fish and shellfish  * stable chronic illness     # no  signs for atopy  >> in prick test some signs for immediate type reaction to cod fish  --> prick tests negatives    These conclusions are made at the best of one's knowledge and belief based on the provided evidence such as patient's history and allergy test results and they can change over time or can be incomplete because of missing information's.    ==> Treatment Plan:  >> try Vanicream cream and Vanicream soap and shampoo (nothing else)  >> no hair dye in the meantime  >> Mometasone ointment 2-3 times    >> every morning Allegra 180mg     Procedures Performed: Allergy tests, including prick and patch tests    {[email or web address]}: provider    Follow-up in Derm-Allergy clinic for 1st readings of patch tests after 2 days and 2nd readings and final conclusions after 4 days   ___________________________    I spent a total of 40 minutes with Colette Patricia during today s  visit. This time was spent discussing all the individual test results, correlating them to the clinical relevance, counseling the patient and/or coordinating care and performing allergy tests           Again, thank you for allowing me to participate in the care of your patient.        Sincerely,        Arsen Rocha MD

## 2023-03-08 ENCOUNTER — OFFICE VISIT (OUTPATIENT)
Dept: ALLERGY | Facility: CLINIC | Age: 63
End: 2023-03-08
Payer: COMMERCIAL

## 2023-03-08 DIAGNOSIS — Z91.018 FOOD ALLERGY: ICD-10-CM

## 2023-03-08 DIAGNOSIS — L23.89 ALLERGIC CONTACT DERMATITIS DUE TO OTHER AGENTS: Primary | ICD-10-CM

## 2023-03-08 PROCEDURE — 95004 PERQ TESTS W/ALRGNC XTRCS: CPT | Performed by: DERMATOLOGY

## 2023-03-08 NOTE — PROGRESS NOTES
[facility] Dermato-allergology Note  Office visit  Encounter Date: Mar 8, 2023  ____________________________________________    CC: No chief complaint on file.      HPI:  (Mar 8, 2023)  Ms. Colette Patricia is a(n) 63 year old female who presents today as a return patient for allergy consultation  - Follow-up in Derm-Allergy clinic for 1st readings of patch tests after 2 days   - otherwise feeling well in usual state of health    Physical exam:  General: In no acute distress, well-developed, well-nourished  Eyes: no conjunctivitis  ENT: no signs of rhinitis   Pulmonary: no wheezing or coughing  Skin:Focused examination of the skin on test sites was performed = see test results below    Earlier History and Allergy exams:  (Mar 6, 2023)  - Follow-up in Derm-Allergy clinic for patch tests as planned     Earlier History and Allergy exams:  (Oct 25, 2022)  - patient has an itchy rash on neck and shoulder and flank area  - problems since January  Skin: over the neck and upper shoulder area typical subacute eczematous lesions and some nummuar lesions on trunk and less extremities.     Past Medical History:   Patient Active Problem List   Diagnosis     Headache     Insomnia     Generalized osteoarthrosis, unspecified site     Other unspecified back disorder     Low back pain     GERD (gastroesophageal reflux disease)     Advanced directives, counseling/discussion     Mixed hyperlipidemia     Hyperlipidemia with target LDL less than 130     Gastroesophageal reflux disease, esophagitis presence not specified     Chronic left-sided low back pain without sciatica     Gastroesophageal reflux disease without esophagitis     Foraminal stenosis of lumbosacral region     Fatigue, unspecified type     Vitamin D deficiency     Osteopenia of spine     Past Medical History:   Diagnosis Date     Other unspecified back disorder        Allergies:  Allergies   Allergen Reactions     No Known Drug Allergies         Medications:  Current Outpatient Medications   Medication     emollient (VANICREAM) external cream     fexofenadine (ALLEGRA) 180 MG tablet     HYDROcodone-acetaminophen (NORCO) 5-325 MG tablet     mometasone (ELOCON) 0.1 % external ointment     pantoprazole (PROTONIX) 40 MG EC tablet     triamcinolone (ARISTOCORT HP) 0.5 % external cream     No current facility-administered medications for this visit.       Social History:    Family History:  Family History   Problem Relation Age of Onset     Family History Negative Mother      Family History Negative Father      Glaucoma No family hx of      Macular Degeneration No family hx of        Previous Labs, Allergy Tests, Dermatopathology, Imaging:  none    Referred By: Elizabeth Sarah  [facility]  [address] [place] [address]     Allergy Tests:    Past Allergy Test    Order for Future Allergy Testing:    [x] Outpatient  [] Inpatient: [facility]..../ Bed ....       Skin Atopy (atopic dermatitis) [] Yes   [x] No .........  Contact allergies:   [] Yes   [x] No ..........  Hand eczema:   [] Yes   [x] No           Leading hand:   [] R   [] L       [] Ambidextrous         Drug allergies:        [] Yes   [x] No  which?......    Urticaria/Angioedema  [] Yes   [x] No .........  Food Allergy:  [] Yes   [x] No  which?......  Pets :  [] Yes   [x] No  which?......         []  Rhinitis   [] Conjunctivitis   [] Sinusitis   [] Polyposis   [] Otitis   [] Pharyngitis         []  Postnasal drip    [x]  none  Operations:   [] Tonsils   [] Septum   [] Sinus   [] Polyposis        [] Asthma bronchiale   [] Coughing      [x]  none  Symptoms (mostly Rhinoconjunctivitis and Asthma) aggravated by:  Season   [] I   [] II   [] III   [] IV   []V   []VI   []VII   []VIII   []IX   []X   []XI   []XII     [] perennial   Day time      [] morning   [] noon      [] evening        [] night    [] whole day........  []  none  Location/changes    [] inside        [] outside   [] mountains    []  sea     [] others.............   []  none  Triggers, specific     [] animals     [] plants     [] dust              [] others ...........................    []  none  Triggers, others       [] work          [] psyche    [] sport            [] others .............................  []  none  Irritant                [] phys efforts [] smoke    [] heat/cold     [] odors  []others............... []  none    Order for PATCH TESTS  Reason for tests (suspected allergy): suspicion for allergic contact dermatitis  Known previous allergies: none  Standardized panels  [x] Standard panel (40 tests)  [x] Preservatives & Antimicrobials (31 tests)  [x] Emulsifiers & Additives (25 tests)   [x] Perfumes/Flavours & Plants (25 tests)  [x] Hairdresser panel (12 tests)  [] Rubber Chemicals (22 tests)  [] Plastics (26 tests)  [] Colorants/Dyes/Food additives (20 tests)  [] Metals (implants/dental) (24 tests)  [] Local anaesthetics/NSAIDs (13 tests)  [] Antibiotics & Antimycotics (14 tests)   [] Corticosteroids (15 tests)   [] Photopatch test (62 tests)   [] others: ...      [] Patient's own products: ...    DO NOT test if chemical or biological identity is unknown!     always ask from patient the product information and safety sheets (MSDS)       Order for PRICK TESTS    Reason for tests (suspected allergy): atopy? No signs  Known previous allergies: none    Standardized prick panels  [x] Atopic panel (20 tests)  [] Pediatric Panel (12 tests)  [] Milk, Meat, Eggs, Grains (20 tests)   [] Dust, Epithelia, Feathers (10 tests)  [] Fish, Seafood, Shellfish (17 tests)  [] Nuts, Beans (14 tests)  [] Spice, Vegetable, Fruit (17 tests)  [] Pollen Panel = Tree, Grass, Weed (24 tests)  [] Others: ...      [] Patient's own products: ...    DO NOT test if chemical or biological identity is unknown!     always ask from patient the product information and safety sheets (MSDS)     Standardized intradermal tests  [] Penicillium notatum [] Aspergillus  fumigatus [] House dust mites D.far & D. pteron  [] Cat    [] dog  [] Others: ...  [] Bee venom   [] Wasp venom  !!Specific protocol with dilutions!!       Order for Drug allergy tests (prick & Intradermal & patch tests)    [] Penicillin G  [] Ampicillin [] Cefazolin   [] Ceftriaxone   [] Ceftazidime  [] Bactrim    [] Others: ...  Order for ... as test date    Atopy Screen (Placed Oct 25, 2022)  No Substance Readings (15 min) Evaluation   POS Histamine 1mg/ml ++    NEG NaCl 0.9% -      No Substance Readings (15 min) Evaluation   1 Alternaria alternata (tenuis)  -    2 Cladosporium herbarum -    3 Aspergillus fumigatus -    4 Penicillium notatum -    5 Dermatophagoides pteronyssinus -    6 Dermatophagoides farinae -    7 Dog epithelium (canis spp) -    8 Cat hair (jazzy catus) -    9 Cockroach   (Blatella americana & germanica) -    10 Grass mix midwest   (Ann, Orchard, Redtop, Jay Jay) -    11 Stef grass (sorghum halepense) -    12 Weed mix   (common Cocklebur, Lamb s quarters, rough redroot Pigweed, Dock/Sorrel) -    13 Mug wort (artemisia vulgare) -    14 Ragweed giant/short (ambrosia spp) -    15 White birch (Betula papyrifera) -    16 Tree mix 1 (Pecan, Maple BHR, Oak RVW, american [place], black [place]) -    17 Red cedar (juniperus virginia) -    18 Tree mix 2   (white [place], river/red Birch, black [place], common [place], american Elm) -    19 Box elder/Maple mix (acer spp) -    20 [place] shagbark (carya ovata) -           Conclusion: basically all tests negatives    Fish and Seafood (Placed Mar 6, 2023)   No Substance Readings (15min) Evaluation   POS Histamine 1mg/ml ++    NEG NaCl 0.9% -      No Substance Readings (15min) Evaluation   1 Codfish (gadus morhua) +/++    2 Flounder (platichthys spp) -    3 Tuna (thunnus albecarus) -    4 Bass (centropristis striata) -    5 Mackerel (scomberomorus cavalla) -    6 Halibut (hipoglossus hipoglossus) -    7 [place] (salmo jose) -    8 Catfish (ictalurus spp) -     9 Perch (serranus scriba) -    10 Cranks, [place] (oncorhynchus mykiss) -    11 Crab (callinectes spp) -    12 Lobster (homarus americanus) -    13 Shrimp white/brown/pink (farfantepenaeus&litopenaeus) -    14 Kingcrab (paralithodes camtschatica) -    15 Scallop (placopecten magellanicus) -    16 Clam (mercenaria mercenaria) -    17 Oyster (cstrea/crassostrea) -    Conclusion: some reaction to the cod fish, but not to other fish and shellfish    Milk, Meat, Egg, and Grains (Placed Mar 8, 2023)  No Substance Readings (15min) Evaluation   POS Histamine 1mg/ml ++    NEG NaCl 0.9% -      No Substance Readings (15min) Evaluation   1 Cows milk whole (billie zina) -    2 Casein (billie zina) -    3 Beef (billie zina) -    4 Goat milk -    5 Lamb (ovis aakash) -    6 Egg white (gallus gallus) -    7 Egg yolk (gallus gallus) -    8 Chicken (gallus gallus) -    9 Turkey (meleagris gallopavo) -    10 Pork (silvestre scrofa) -    11 Barley (hordeum vulgare) -    12 Buckwheat (fagopyrum esculentum) -    13 Corn (elmer mais) -    14 Hops (humulus lupulus)  -    15 Malt (hordeum vulgare)  -    16 Oat (tita sativa) -    17 Rice (oryza sativa) -    18 Rye (secale cereale) -    19 Whole wheat (triticum aestivum) -    20  White wheat flour - Patients own    Conclusion:     ________________________________    RESULTS & EVALUATION of PATCH TESTS    Mar 6, 2023 application of patch tests:    Patch test readings after     [x] 2 days, [] 3 days [x] 4 days, [] 5 days,  Other duration: ...      STANDARD Series                                          # Substance 2 days 4 days remarks     1 Aries Mix [C] - -       2 Colophony - -       3  2-Mercaptobenzothiazole  - -       4 Methylisothiazolinone - -       5 Carba Mix - -       6 Thiuram Mix [A] - -       7 Bisphenol A Epoxy Resin - -       8 J-Mbzj-Xqdatzzqpor-Formaldehyde Resin - -       9 Mercapto Mix [A] - -       10 Black Rubber Mix- PPD [B] - -       11 Potassium Dichromate  -  -       12 Balsam of  [place] (Myroxylon Pereirae Resin) - -       13 Nickel Sulphate Hexahydrate - -       14 Mixed Dialkyl Thiourea - -       15 Paraben Mix [B] - -       16 Methyldibromo Glutaronitrile - -       17 Fragrance Mix 8% - -       18 2-Bromo-2-Nitropropane-1,3-Diol (Bronopol) CT NA NA       19 Lyral - -       20 Tixocortol-21- Pivalate CT - -       21 Diazolidinyl urea (Germall II) - -        22 Methyl Methacrylate - -       23 Cobalt (II) Chloride Hexahydrate - -       24 Fragrance Mix II  - -       25 Compositae Mix - -       26 Benzoyl Peroxide - -       27 Bacitracin - -       28 Formaldehyde - -       29 Methylchloroisothiazolinone / Methylisothiazolinone - -       30 Corticosteroid Mix CT NA NA       31 Sodium Lauryl Sulfate - -       32 Lanolin Alcohol - -       33 Turpentine - -       34 Cetylstearylalcohol - -       35 Chlorhexidine Dicluconate - -       36 Budesonide - -       37 Imidazolidinyl Urea  - -       38 Ethyl-2 Cyanoacrylate - -       39 Quaternium 15 (Dowicil 200) - -       40 Decyl Glucoside - -       PRESERVATIVES & ANTIMICROBIALS        # Substance 2 days 4 days remarks   41 1  1,2-Benzisothiazoline-3-One, Sodium Salt - -     2  1,3,5-Kasi (2-Hydroxyethyl) - Hexahydrotriazine (Grotan BK) - -     3 2-Aacobravtljen-9-Nitro-1, 3-Propanediol - -     4  3, 4, 4' - Triclocarban - -    45 5 4 - Chloro - 3 - Cresol - -     6 4 - Chloro - 4 - Xylenol (PCMX) - -     7 7-Ethylbicyclooxazolidine (Bioban EF2207) - -     8 Benzalkonium Chloride CT - -     9 Benzyl Alcohol - -    50 10 Cetalkonium Chloride - -     11 Cetylpyrimidine Chloride  - -     12 Chloroacetamide - -     13 DMDM Hydantoin - -     14 Glutaraldehyde - -    55 15 Triclosan - -     16 Glyoxal Trimeric Dihydrate - -     17 Iodopropynyl Butylcarbamate - -     18 Octylisothiazoline - -     19 Bithionol CT - -    60 20 Bioban P 1487 (Nitrobutyl) Morpholine/(Ethylnitro-Trimethylene) Dimorpholine - -     21 Phenoxyethanol - -     22 Phenyl Salicylate - -      23 Povidone Iodine - -     24 Sodium Benzoate - -    65 25 Sodium Disulfite - -     26 Sorbic Acid - -     27 Thimerosal - -     28 Melamine Formaldehyde Resin - -     29 Ethylenediamine Dihydrochloride - -      Parabens      70 30 Butyl-P-Hydroxybenzoate - -     31 Ethyl-P-Hydroxybenzoate NA NA     32 Methyl-P-Hydroxybenzoate - -    73 33 Propyl-P-Hydroxybenzoate - -      EMULSIFIERS & ADDITIVES       # Substance 2 days 4 days remarks   74 1 Polyethylene Glycol-400 - -    75 2 Cocamidopropyl Betaine - -     3 Amerchol L101 - -     4 Propylene Glycol - -     5 Triethanolamine - -     6 Sorbitane Sesquiolate CT - -    80 7 Isopropylmyristate - -     8 Polysorbate 80 CT - -     9 Amidoamine   (Stearamidopropyl Dimethylamine) - -     10 Oleamidopropyl Dimethylamine - -     11 Lauryl Glucoside - -    85 12 Coconut Diethanolamide  - -     13 2-Hydroxy-4-Methoxy Benzophenone (Oxybenzone) - -     14 Benzophenone-4 (Sulisobenzon) - -     15 Propolis - -     16 Dexpanthenol - -    90 17 Abitol - -     18 Tert-Butylhydroquinone - -     19 Benzyl Salicylate - -     20 Dimethylaminopropylamin (DMPA) - -     21 Zinc Pyrithione (Zinc Omadine)  - -    95 22 Kasi(Hydroxymethyl) Nitromethane  - -      Antioxidant       23 Dodecyl Gallate - -     24 Butylhydroxyanisole (BHA) - -     25 Butylhydroxytoluene (BHT) - -     26 Di-Alpha-Tocopherol (Vit E) - -    100 27 Propyl Gallate - -      PERFUMES, FLAVORS & PLANTS        # Substance 2 days 4 days remarks   101 1 Benzyl Cinnamate - -     2 Di-Limonene (Dipentene) - -     3 Cananga Odorata (Yisel Morillo) (I) - -     4 Lichen Acid Mix - -    105 5 Mentha Piperita Oil (Peppermint Oil) - -     6 Sesquiterpenelactone mix - -     7 Tea Tree Oil, Oxidized - -     8 Wood Tar Mix - -     9 Abietic Acid NA NA    110 10 Lavendula Angustifolia Oil (Lavender Oil) - -     11 Fragrance mix II CT * 14% - -      Fragrance Mix I       12 [place] Absolute - -     13 Eugenol - -     14 Geraniol - -     115 15 Hydroxycitronellal - -     16 Isoeugenol - -     17 Cinnamic Aldehyde - -     18 Cinnamic Alcohol  - -      Fragrance mix II       19 Citronellol NA NA    120 20 Alpha-Hexylcinnamic Aldehyde    - -     21 Citral - -     22 Farnesol - -    123 23 Coumarin - -      Hexylcinnamic aldehyde, Coumarin, Farnesol, Hydroxyisohexy3-cyclohexene carboxaldehyde, citral, citrolellol  HAIRDRESSER        # Substance 2 days 4 days remarks   124 1 P-Phenylenediamine -  -    125 2 P-Toluenediamine Sulphate  -  -     3 Ammonium Thioglycolate - -     4 Ammonium Persulfate - -     5 Resorcinol - -     6 3-Aminophenol - -    130 7 P-Aminophenol - -     8 Glyceryl Monothioglycolate - -    132 9 Pyrogallol - -      Results of patch tests:                         Interpretation:  - Negative                    A    = Allergic      (+) Erythema    TI   = Toxic/irritant   + E + Infiltration    RaP = Relevance at Present     ++ E/I + Papulovesicle   Rpr  = Relevance Previously     +++ E/I/P + Blister     nR   = No Relevance      [x] No relevant allergic reaction observed    [] Allergic reaction diagnosed against following allergens:      Interpretation/ remarks:   See later    [] Patient information given   [] ACDS CAMP information's (# ....) to following compounds: .....   [] General information's to following compounds: ......      Assessment & Plan:    ==> Final Diagnosis:     # suspicion for allergic contact dermatitis on neck and trunk   DDx additives in shampoos and soap or hair dye (particularly with lesions on neck)  * chronic illness with exacerbation, progression, side effects from treatment    # some indicators for physical Urticaria  >> think that it can be aggravated by fish and shellfish  * stable chronic illness     # no signs for atopy  >> in prick test some signs for immediate type reaction to cod fish  --> prick tests negatives    These conclusions are made at the best of one's knowledge and belief based on the  provided evidence such as patient's history and allergy test results and they can change over time or can be incomplete because of missing information's.    ==> Treatment Plan:  >> try Vanicream cream and Vanicream soap and shampoo (nothing else)  >> no hair dye in the meantime  >> Mometasone ointment 2-3 times    >> every morning Allegra 180mg     ___________________________    Procedures Performed: Allergy tests, including prick tests    Staff: : provider    Follow-up in Derm-Allergy clinic for 2nd readings and final conclusions after 4 days   ___________________________    I spent a total of 20 minutes with Colette Patricia during today s  visit. This time was spent discussing all the individual test results, correlating them to the clinical relevance, counseling the patient and/or coordinating care and performing allergy tests or procedures.

## 2023-03-08 NOTE — LETTER
3/8/2023         RE: Colette Patricia  [address]  [place] [address]        Dear Colleague,    Thank you for referring your patient, Colette Patricia, to the [facility] [place]. Please see a copy of my visit note below.    [facility] Dermato-allergology Note  Office visit  Encounter Date: Mar 8, 2023  ____________________________________________    CC: No chief complaint on file.      HPI:  (Mar 8, 2023)  Ms. Colette Patricia is a(n) 63 year old female who presents today as a return patient for allergy consultation  - Follow-up in Derm-Allergy clinic for 1st readings of patch tests after 2 days   - otherwise feeling well in usual state of health    Physical exam:  General: In no acute distress, well-developed, well-nourished  Eyes: no conjunctivitis  ENT: no signs of rhinitis   Pulmonary: no wheezing or coughing  Skin:Focused examination of the skin on test sites was performed = see test results below    Earlier History and Allergy exams:  (Mar 6, 2023)  - Follow-up in Derm-Allergy clinic for patch tests as planned     Earlier History and Allergy exams:  (Oct 25, 2022)  - patient has an itchy rash on neck and shoulder and flank area  - problems since January  Skin: over the neck and upper shoulder area typical subacute eczematous lesions and some nummuar lesions on trunk and less extremities.     Past Medical History:   Patient Active Problem List   Diagnosis     Headache     Insomnia     Generalized osteoarthrosis, unspecified site     Other unspecified back disorder     Low back pain     GERD (gastroesophageal reflux disease)     Advanced directives, counseling/discussion     Mixed hyperlipidemia     Hyperlipidemia with target LDL less than 130     Gastroesophageal reflux disease, esophagitis presence not specified     Chronic left-sided low back pain without sciatica     Gastroesophageal reflux disease without esophagitis     Foraminal stenosis of lumbosacral  region     Fatigue, unspecified type     Vitamin D deficiency     Osteopenia of spine     Past Medical History:   Diagnosis Date     Other unspecified back disorder        Allergies:  Allergies   Allergen Reactions     No Known Drug Allergies        Medications:  Current Outpatient Medications   Medication     emollient (VANICREAM) external cream     fexofenadine (ALLEGRA) 180 MG tablet     HYDROcodone-acetaminophen (NORCO) 5-325 MG tablet     mometasone (ELOCON) 0.1 % external ointment     pantoprazole (PROTONIX) 40 MG EC tablet     triamcinolone (ARISTOCORT HP) 0.5 % external cream     No current facility-administered medications for this visit.       Social History:    Family History:  Family History   Problem Relation Age of Onset     Family History Negative Mother      Family History Negative Father      Glaucoma No family hx of      Macular Degeneration No family hx of        Previous Labs, Allergy Tests, Dermatopathology, Imaging:  none    Referred By: Elizabeth Sarah  [facility]  [address] [place] [address]     Allergy Tests:    Past Allergy Test    Order for Future Allergy Testing:    [x] Outpatient  [] Inpatient: [facility]..../ Bed ....       Skin Atopy (atopic dermatitis) [] Yes   [x] No .........  Contact allergies:   [] Yes   [x] No ..........  Hand eczema:   [] Yes   [x] No           Leading hand:   [] R   [] L       [] Ambidextrous         Drug allergies:        [] Yes   [x] No  which?......    Urticaria/Angioedema  [] Yes   [x] No .........  Food Allergy:  [] Yes   [x] No  which?......  Pets :  [] Yes   [x] No  which?......         []  Rhinitis   [] Conjunctivitis   [] Sinusitis   [] Polyposis   [] Otitis   [] Pharyngitis         []  Postnasal drip    [x]  none  Operations:   [] Tonsils   [] Septum   [] Sinus   [] Polyposis        [] Asthma bronchiale   [] Coughing      [x]  none  Symptoms (mostly Rhinoconjunctivitis and Asthma) aggravated by:  Season   [] I   [] II   [] III   [] IV    []V   []VI   []VII   []VIII   []IX   []X   []XI   []XII     [] perennial   Day time      [] morning   [] noon      [] evening        [] night    [] whole day........  []  none  Location/changes    [] inside        [] outside   [] mountains    [] sea     [] others.............   []  none  Triggers, specific     [] animals     [] plants     [] dust              [] others ...........................    []  none  Triggers, others       [] work          [] psyche    [] sport            [] others .............................  []  none  Irritant                [] phys efforts [] smoke    [] heat/cold     [] odors  []others............... []  none    Order for PATCH TESTS  Reason for tests (suspected allergy): suspicion for allergic contact dermatitis  Known previous allergies: none  Standardized panels  [x] Standard panel (40 tests)  [x] Preservatives & Antimicrobials (31 tests)  [x] Emulsifiers & Additives (25 tests)   [x] Perfumes/Flavours & Plants (25 tests)  [x] Hairdresser panel (12 tests)  [] Rubber Chemicals (22 tests)  [] Plastics (26 tests)  [] Colorants/Dyes/Food additives (20 tests)  [] Metals (implants/dental) (24 tests)  [] Local anaesthetics/NSAIDs (13 tests)  [] Antibiotics & Antimycotics (14 tests)   [] Corticosteroids (15 tests)   [] Photopatch test (62 tests)   [] others: ...      [] Patient's own products: ...    DO NOT test if chemical or biological identity is unknown!     always ask from patient the product information and safety sheets (MSDS)       Order for PRICK TESTS    Reason for tests (suspected allergy): atopy? No signs  Known previous allergies: none    Standardized prick panels  [x] Atopic panel (20 tests)  [] Pediatric Panel (12 tests)  [] Milk, Meat, Eggs, Grains (20 tests)   [] Dust, Epithelia, Feathers (10 tests)  [] Fish, Seafood, Shellfish (17 tests)  [] Nuts, Beans (14 tests)  [] Spice, Vegetable, Fruit (17 tests)  [] Pollen Panel = Tree, Grass, Weed (24 tests)  [] Others: ...      []  Patient's own products: ...    DO NOT test if chemical or biological identity is unknown!     always ask from patient the product information and safety sheets (MSDS)     Standardized intradermal tests  [] Penicillium notatum [] Aspergillus fumigatus [] House dust mites D.far & D. pteron  [] Cat    [] dog  [] Others: ...  [] Bee venom   [] Wasp venom  !!Specific protocol with dilutions!!       Order for Drug allergy tests (prick & Intradermal & patch tests)    [] Penicillin G  [] Ampicillin [] Cefazolin   [] Ceftriaxone   [] Ceftazidime  [] Bactrim    [] Others: ...  Order for ... as test date    Atopy Screen (Placed Oct 25, 2022)  No Substance Readings (15 min) Evaluation   POS Histamine 1mg/ml ++    NEG NaCl 0.9% -      No Substance Readings (15 min) Evaluation   1 Alternaria alternata (tenuis)  -    2 Cladosporium herbarum -    3 Aspergillus fumigatus -    4 Penicillium notatum -    5 Dermatophagoides pteronyssinus -    6 Dermatophagoides farinae -    7 Dog epithelium (canis spp) -    8 Cat hair (jazzy catus) -    9 Cockroach   (Blatella americana & germanica) -    10 Grass mix midwest   (Ann, Orchard, Redtop, Jay Jay) -    11 Stef grass (sorghum halepense) -    12 Weed mix   (common Cocklebur, Lamb s quarters, rough redroot Pigweed, Dock/Sorrel) -    13 Mug wort (artemisia vulgare) -    14 Ragweed giant/short (ambrosia spp) -    15 White birch (Betula papyrifera) -    16 Tree mix 1 (Pecan, Maple BHR, Oak RVW, american Mears, black [place]) -    17 Red cedar (juniperus virginia) -    18 Tree mix 2   (white [place], river/red Birch, black [place], common [place], american Elm) -    19 Box elder/Maple mix (acer spp) -    20 [place] shagbark (carya ovata) -           Conclusion: basically all tests negatives    Fish and Seafood (Placed Mar 6, 2023)   No Substance Readings (15min) Evaluation   POS Histamine 1mg/ml ++    NEG NaCl 0.9% -      No Substance Readings (15min) Evaluation   1 Codfish (gadus morhua) +/++     2 Flounder (platichthys spp) -    3 Tuna (thunnus albecarus) -    4 Bass (centropristis striata) -    5 Mackerel (scomberomorus cavalla) -    6 Halibut (hipoglossus hipoglossus) -    7 [place] (salmo jose) -    8 Catfish (ictalurus spp) -    9 Perch (serranus scriba) -    10 [place], [place] (oncorhynchus mykiss) -    11 Crab (callinectes spp) -    12 Lobster (homarus americanus) -    13 Shrimp white/brown/pink (farfantepenaeus&litopenaeus) -    14 Kingcrab (paralithodes camtschatica) -    15 Scallop (placopecten magellanicus) -    16 Clam (mercenaria mercenaria) -    17 Oyster (cstrea/crassostrea) -    Conclusion: some reaction to the cod fish, but not to other fish and shellfish    Milk, Meat, Egg, and Grains (Placed Mar 8, 2023)  No Substance Readings (15min) Evaluation   POS Histamine 1mg/ml ++    NEG NaCl 0.9% -      No Substance Readings (15min) Evaluation   1 Cows milk whole (billie zina) -    2 Casein (billie zina) -    3 Beef (billie zina) -    4 Goat milk -    5 Lamb (ovis aakash) -    6 Egg white (gallus gallus) -    7 Egg yolk (gallus gallus) -    8 Chicken (gallus gallus) -    9 Turkey (meleagris gallopavo) -    10 Pork (silvestre scrofa) -    11 Barley (hordeum vulgare) -    12 Buckwheat (fagopyrum esculentum) -    13 Corn (elmer mais) -    14 Hops (humulus lupulus)  -    15 Malt (hordeum vulgare)  -    16 Oat (tita sativa) -    17 Rice (oryza sativa) -    18 Rye (secale cereale) -    19 Whole wheat (triticum aestivum) -    20  White wheat flour - Patients own    Conclusion:     ________________________________    RESULTS & EVALUATION of PATCH TESTS    Mar 6, 2023 application of patch tests:    Patch test readings after     [x] 2 days, [] 3 days [x] 4 days, [] 5 days,  Other duration: ...      STANDARD Series                                          # Substance 2 days 4 days remarks     1 Aries Mix [C] - -       2 Colophony - -       3  2-Mercaptobenzothiazole  - -       4 Methylisothiazolinone - -       5 Carba Mix  - -       6 Thiuram Mix [A] - -       7 Bisphenol A Epoxy Resin - -       8 Q-Ezuq-Mpkikhvdrku-Formaldehyde Resin - -       9 Mercapto Mix [A] - -       10 Black Rubber Mix- PPD [B] - -       11 Potassium Dichromate  -  -       12 Balsam of [place] (Myroxylon Pereirae Resin) - -       13 Nickel Sulphate Hexahydrate - -       14 Mixed Dialkyl Thiourea - -       15 Paraben Mix [B] - -       16 Methyldibromo Glutaronitrile - -       17 Fragrance Mix 8% - -       18 2-Bromo-2-Nitropropane-1,3-Diol (Bronopol) CT NA NA       19 Lyral - -       20 Tixocortol-21- Pivalate CT - -       21 Diazolidinyl urea (Germall II) - -        22 Methyl Methacrylate - -       23 Cobalt (II) Chloride Hexahydrate - -       24 Fragrance Mix II  - -       25 Compositae Mix - -       26 Benzoyl Peroxide - -       27 Bacitracin - -       28 Formaldehyde - -       29 Methylchloroisothiazolinone / Methylisothiazolinone - -       30 Corticosteroid Mix CT NA NA       31 Sodium Lauryl Sulfate - -       32 Lanolin Alcohol - -       33 Turpentine - -       34 Cetylstearylalcohol - -       35 Chlorhexidine Dicluconate - -       36 Budesonide - -       37 Imidazolidinyl Urea  - -       38 Ethyl-2 Cyanoacrylate - -       39 Quaternium 15 (Dowicil 200) - -       40 Decyl Glucoside - -       PRESERVATIVES & ANTIMICROBIALS        # Substance 2 days 4 days remarks   41 1  1,2-Benzisothiazoline-3-One, Sodium Salt - -     2  1,3,5-Kasi (2-Hydroxyethyl) - Hexahydrotriazine (Grotan BK) - -     3 3-Nmtvjqnpefula-8-Nitro-1, 3-Propanediol - -     4  3, 4, 4' - Triclocarban - -    45 5 4 - Chloro - 3 - Cresol - -     6 4 - Chloro - 4 - Xylenol (PCMX) - -     7 7-Ethylbicyclooxazolidine (Bioban ST8045) - -     8 Benzalkonium Chloride CT - -     9 Benzyl Alcohol - -    50 10 Cetalkonium Chloride - -     11 Cetylpyrimidine Chloride  - -     12 Chloroacetamide - -     13 DMDM Hydantoin - -     14 Glutaraldehyde - -    55 15 Triclosan - -     16 Glyoxal Trimeric Dihydrate -  -     17 Iodopropynyl Butylcarbamate - -     18 Octylisothiazoline - -     19 Bithionol CT - -    60 20 Bioban P 1487 (Nitrobutyl) Morpholine/(Ethylnitro-Trimethylene) Dimorpholine - -     21 Phenoxyethanol - -     22 Phenyl Salicylate - -     23 Povidone Iodine - -     24 Sodium Benzoate - -    65 25 Sodium Disulfite - -     26 Sorbic Acid - -     27 Thimerosal - -     28 Melamine Formaldehyde Resin - -     29 Ethylenediamine Dihydrochloride - -      Parabens      70 30 Butyl-P-Hydroxybenzoate - -     31 Ethyl-P-Hydroxybenzoate NA NA     32 Methyl-P-Hydroxybenzoate - -    73 33 Propyl-P-Hydroxybenzoate - -      EMULSIFIERS & ADDITIVES       # Substance 2 days 4 days remarks   74 1 Polyethylene Glycol-400 - -    75 2 Cocamidopropyl Betaine - -     3 Amerchol L101 - -     4 Propylene Glycol - -     5 Triethanolamine - -     6 Sorbitane Sesquiolate CT - -    80 7 Isopropylmyristate - -     8 Polysorbate 80 CT - -     9 Amidoamine   (Stearamidopropyl Dimethylamine) - -     10 Oleamidopropyl Dimethylamine - -     11 Lauryl Glucoside - -    85 12 Coconut Diethanolamide  - -     13 2-Hydroxy-4-Methoxy Benzophenone (Oxybenzone) - -     14 Benzophenone-4 (Sulisobenzon) - -     15 Propolis - -     16 Dexpanthenol - -    90 17 Abitol - -     18 Tert-Butylhydroquinone - -     19 Benzyl Salicylate - -     20 Dimethylaminopropylamin (DMPA) - -     21 Zinc Pyrithione (Zinc Omadine)  - -    95 22 Kasi(Hydroxymethyl) Nitromethane  - -      Antioxidant       23 Dodecyl Gallate - -     24 Butylhydroxyanisole (BHA) - -     25 Butylhydroxytoluene (BHT) - -     26 Di-Alpha-Tocopherol (Vit E) - -    100 27 Propyl Gallate - -      PERFUMES, FLAVORS & PLANTS        # Substance 2 days 4 days remarks   101 1 Benzyl Cinnamate - -     2 Di-Limonene (Dipentene) - -     3 Cananga Odorata (Yisel Morillo) (I) - -     4 Lichen Acid Mix - -    105 5 Mentha Piperita Oil (Peppermint Oil) - -     6 Sesquiterpenelactone mix - -     7 Tea Tree Oil,  Oxidized - -     8 Wood Tar Mix - -     9 Abietic Acid NA NA    110 10 Lavendula Angustifolia Oil (Lavender Oil) - -     11 Fragrance mix II CT * 14% - -      Fragrance Mix I       12 [place] Absolute - -     13 Eugenol - -     14 Geraniol - -    115 15 Hydroxycitronellal - -     16 Isoeugenol - -     17 Cinnamic Aldehyde - -     18 Cinnamic Alcohol  - -      Fragrance mix II       19 Citronellol NA NA    120 20 Alpha-Hexylcinnamic Aldehyde    - -     21 Citral - -     22 Farnesol - -    123 23 Coumarin - -      Hexylcinnamic aldehyde, Coumarin, Farnesol, Hydroxyisohexy3-cyclohexene carboxaldehyde, citral, citrolellol  HAIRDRESSER        # Substance 2 days 4 days remarks   124 1 P-Phenylenediamine -  -    125 2 P-Toluenediamine Sulphate  -  -     3 Ammonium Thioglycolate - -     4 Ammonium Persulfate - -     5 Resorcinol - -     6 3-Aminophenol - -    130 7 P-Aminophenol - -     8 Glyceryl Monothioglycolate - -    132 9 Pyrogallol - -      Results of patch tests:                         Interpretation:  - Negative                    A    = Allergic      (+) Erythema    TI   = Toxic/irritant   + E + Infiltration    RaP = Relevance at Present     ++ E/I + Papulovesicle   Rpr  = Relevance Previously     +++ E/I/P + Blister     nR   = No Relevance      [x] No relevant allergic reaction observed    [] Allergic reaction diagnosed against following allergens:      Interpretation/ remarks:   See later    [] Patient information given   [] ACDS CAMP information's (# ....) to following compounds: .....   [] General information's to following compounds: ......      Assessment & Plan:    ==> Final Diagnosis:     # suspicion for allergic contact dermatitis on neck and trunk   DDx additives in shampoos and soap or hair dye (particularly with lesions on neck)  * chronic illness with exacerbation, progression, side effects from treatment    # some indicators for physical Urticaria  >> think that it can be aggravated by fish and  shellfish  * stable chronic illness     # no signs for atopy  >> in prick test some signs for immediate type reaction to cod fish  --> prick tests negatives    These conclusions are made at the best of one's knowledge and belief based on the provided evidence such as patient's history and allergy test results and they can change over time or can be incomplete because of missing information's.    ==> Treatment Plan:  >> try Vanicream cream and Vanicream soap and shampoo (nothing else)  >> no hair dye in the meantime  >> Mometasone ointment 2-3 times    >> every morning Allegra 180mg     ___________________________    Procedures Performed: Allergy tests, including prick tests    Staff: : provider    Follow-up in Derm-Allergy clinic for 2nd readings and final conclusions after 4 days   ___________________________    I spent a total of 20 minutes with Colette Patricia during today s  visit. This time was spent discussing all the individual test results, correlating them to the clinical relevance, counseling the patient and/or coordinating care and performing allergy tests or procedures.           Again, thank you for allowing me to participate in the care of your patient.        Sincerely,        Arsen Rocha MD

## 2023-03-09 NOTE — PROGRESS NOTES
[facility] Dermato-allergology Note  Office visit  Encounter Date: Mar 10, 2023  ____________________________________________    CC: No chief complaint on file.      HPI:  (Mar 10, 2023)  Ms. Colette Patricia is a(n) 63 year old female who presents today as a return patient for allergy consultation  - Follow-up in Derm-Allergy clinic for 2nd readings and final conclusions after 4 days   - otherwise feeling well in usual state of health    Physical exam:  General: In no acute distress, well-developed, well-nourished  Eyes: no conjunctivitis  ENT: no signs of rhinitis   Pulmonary: no wheezing or coughing  Skin:Focused examination of the skin on test sites was performed = see test results below    Earlier History and Allergy exams:  (Mar 8, 2023)  - Follow-up in Derm-Allergy clinic for 1st readings of patch tests after 2 days     Earlier History and Allergy exams:  (Mar 6, 2023)  - Follow-up in Derm-Allergy clinic for patch tests as planned     Earlier History and Allergy exams:  (Oct 25, 2022)  - patient has an itchy rash on neck and shoulder and flank area  - problems since January  Skin: over the neck and upper shoulder area typical subacute eczematous lesions and some nummuar lesions on trunk and less extremities.     Past Medical History:   Patient Active Problem List   Diagnosis     Headache     Insomnia     Generalized osteoarthrosis, unspecified site     Other unspecified back disorder     Low back pain     GERD (gastroesophageal reflux disease)     Advanced directives, counseling/discussion     Mixed hyperlipidemia     Hyperlipidemia with target LDL less than 130     Gastroesophageal reflux disease, esophagitis presence not specified     Chronic left-sided low back pain without sciatica     Gastroesophageal reflux disease without esophagitis     Foraminal stenosis of lumbosacral region     Fatigue, unspecified type     Vitamin D deficiency     Osteopenia of spine     Past Medical History:    Diagnosis Date     Other unspecified back disorder        Allergies:  Allergies   Allergen Reactions     No Known Drug Allergies        Medications:  Current Outpatient Medications   Medication     emollient (VANICREAM) external cream     fexofenadine (ALLEGRA) 180 MG tablet     HYDROcodone-acetaminophen (NORCO) 5-325 MG tablet     mometasone (ELOCON) 0.1 % external ointment     pantoprazole (PROTONIX) 40 MG EC tablet     triamcinolone (ARISTOCORT HP) 0.5 % external cream     No current facility-administered medications for this visit.       Social History:    Family History:  Family History   Problem Relation Age of Onset     Family History Negative Mother      Family History Negative Father      Glaucoma No family hx of      Macular Degeneration No family hx of        Previous Labs, Allergy Tests, Dermatopathology, Imaging:  none    Referred By: Elizabeth Sarah  [facility]  [address] [place] [address]     Allergy Tests:    Past Allergy Test    Order for Future Allergy Testing:    [x] Outpatient  [] Inpatient: [facility]..../ Bed ....       Skin Atopy (atopic dermatitis) [] Yes   [x] No .........  Contact allergies:   [] Yes   [x] No ..........  Hand eczema:   [] Yes   [x] No           Leading hand:   [] R   [] L       [] Ambidextrous         Drug allergies:        [] Yes   [x] No  which?......    Urticaria/Angioedema  [] Yes   [x] No .........  Food Allergy:  [] Yes   [x] No  which?......  Pets :  [] Yes   [x] No  which?......         []  Rhinitis   [] Conjunctivitis   [] Sinusitis   [] Polyposis   [] Otitis   [] Pharyngitis         []  Postnasal drip    [x]  none  Operations:   [] Tonsils   [] Septum   [] Sinus   [] Polyposis        [] Asthma bronchiale   [] Coughing      [x]  none  Symptoms (mostly Rhinoconjunctivitis and Asthma) aggravated by:  Season   [] I   [] II   [] III   [] IV   []V   []VI   []VII   []VIII   []IX   []X   []XI   []XII     [] perennial   Day time      [] morning   [] noon       [] evening        [] night    [] whole day........  []  none  Location/changes    [] inside        [] outside   [] mountains    [] sea     [] others.............   []  none  Triggers, specific     [] animals     [] plants     [] dust              [] others ...........................    []  none  Triggers, others       [] work          [] psyche    [] sport            [] others .............................  []  none  Irritant                [] phys efforts [] smoke    [] heat/cold     [] odors  []others............... []  none    Order for PATCH TESTS  Reason for tests (suspected allergy): suspicion for allergic contact dermatitis  Known previous allergies: none  Standardized panels  [x] Standard panel (40 tests)  [x] Preservatives & Antimicrobials (31 tests)  [x] Emulsifiers & Additives (25 tests)   [x] Perfumes/Flavours & Plants (25 tests)  [x] Hairdresser panel (12 tests)  [] Rubber Chemicals (22 tests)  [] Plastics (26 tests)  [] Colorants/Dyes/Food additives (20 tests)  [] Metals (implants/dental) (24 tests)  [] Local anaesthetics/NSAIDs (13 tests)  [] Antibiotics & Antimycotics (14 tests)   [] Corticosteroids (15 tests)   [] Photopatch test (62 tests)   [] others: ...      [] Patient's own products: ...    DO NOT test if chemical or biological identity is unknown!     always ask from patient the product information and safety sheets (MSDS)       Order for PRICK TESTS    Reason for tests (suspected allergy): atopy? No signs  Known previous allergies: none    Standardized prick panels  [x] Atopic panel (20 tests)  [] Pediatric Panel (12 tests)  [] Milk, Meat, Eggs, Grains (20 tests)   [] Dust, Epithelia, Feathers (10 tests)  [] Fish, Seafood, Shellfish (17 tests)  [] Nuts, Beans (14 tests)  [] Spice, Vegetable, Fruit (17 tests)  [] Pollen Panel = Tree, Grass, Weed (24 tests)  [] Others: ...      [] Patient's own products: ...    DO NOT test if chemical or biological identity is unknown!     always ask from  patient the product information and safety sheets (MSDS)     Standardized intradermal tests  [] Penicillium notatum [] Aspergillus fumigatus [] House dust mites D.far & D. pteron  [] Cat    [] dog  [] Others: ...  [] Bee venom   [] Wasp venom  !!Specific protocol with dilutions!!       Order for Drug allergy tests (prick & Intradermal & patch tests)    [] Penicillin G  [] Ampicillin [] Cefazolin   [] Ceftriaxone   [] Ceftazidime  [] Bactrim    [] Others: ...  Order for ... as test date    Atopy Screen (Placed Oct 25, 2022)  No Substance Readings (15 min) Evaluation   POS Histamine 1mg/ml ++    NEG NaCl 0.9% -      No Substance Readings (15 min) Evaluation   1 Alternaria alternata (tenuis)  -    2 Cladosporium herbarum -    3 Aspergillus fumigatus -    4 Penicillium notatum -    5 Dermatophagoides pteronyssinus -    6 Dermatophagoides farinae -    7 Dog epithelium (canis spp) -    8 Cat hair (jazzy catus) -    9 Cockroach   (Blatella americana & germanica) -    10 Grass mix midwest   (Ann, Orchard, Redtop, Jay Jay) -    11 Stef grass (sorghum halepense) -    12 Weed mix   (common Cocklebur, Lamb s quarters, rough redroot Pigweed, Dock/Sorrel) -    13 Mug wort (artemisia vulgare) -    14 Ragweed giant/short (ambrosia spp) -    15 White birch (Betula papyrifera) -    16 Tree mix 1 (Pecan, Maple BHR, Oak RVW, american [place], black [place]) -    17 Red cedar (juniperus virginia) -    18 Tree mix 2   (white [place], river/red Birch, black [place], common [place], american Elm) -    19 Box elder/Maple mix (acer spp) -    20 [place] shagbark (carya ovata) -           Conclusion: basically all tests negatives    Fish and Seafood (Placed Mar 6, 2023)   No Substance Readings (15min) Evaluation   POS Histamine 1mg/ml ++    NEG NaCl 0.9% -      No Substance Readings (15min) Evaluation   1 Codfish (gadus morhua) +/++    2 Flounder (platichthys spp) -    3 Tuna (thunnus albecarus) -    4 Bass (centropristis striata) -    5  Mackerel (scomberomorus cavalla) -    6 Halibut (hipoglossus hipoglossus) -    7 Marinette (salmo jose) -    8 Catfish (ictalurus spp) -    9 Perch (serranus scriba) -    10 [place], [place] (oncorhynchus mykiss) -    11 Crab (callinectes spp) -    12 Lobster (homarus americanus) -    13 Shrimp white/brown/pink (farfantepenaeus&litopenaeus) -    14 Kingcrab (paralithodes camtschatica) -    15 Scallop (placopecten magellanicus) -    16 Clam (mercenaria mercenaria) -    17 Oyster (cstrea/crassostrea) -    Conclusion: some reaction to the cod fish, but not to other fish and shellfish    Milk, Meat, Egg, and Grains (Placed Mar 8, 2023)  No Substance Readings (15min) Evaluation   POS Histamine 1mg/ml ++    NEG NaCl 0.9% -      No Substance Readings (15min) Evaluation   1 Cows milk whole (billie zina) -    2 Casein (billie zina) -    3 Beef (billie izna) -    4 Goat milk -    5 Lamb (ovis aakash) -    6 Egg white (gallus gallus) -    7 Egg yolk (gallus gallus) -    8 Chicken (gallus gallus) -    9 Turkey (meleagris gallopavo) -    10 Pork (silvestre scrofa) -    11 Barley (hordeum vulgare) -    12 Buckwheat (fagopyrum esculentum) -    13 Corn (elmer mais) -    14 Hops (humulus lupulus)  -    15 Malt (hordeum vulgare)  -    16 Oat (tita sativa) -    17 Rice (oryza sativa) -    18 Rye (secale cereale) -    19 Whole wheat (triticum aestivum) -    20  White wheat flour - Patients own    Conclusion:     ________________________________    RESULTS & EVALUATION of PATCH TESTS    Mar 6, 2023 application of patch tests:    Patch test readings after     [x] 2 days, [] 3 days [x] 4 days, [] 5 days,  Other duration: ...      STANDARD Series                                          # Substance 2 days 4 days remarks     1 Aries Mix [C] - -       2 Colophony - -       3  2-Mercaptobenzothiazole  - -       4 Methylisothiazolinone - -       5 Carba Mix - -       6 Thiuram Mix [A] - -       7 Bisphenol A Epoxy Resin - -       8  B-Ispl-Tstsvncanob-Formaldehyde Resin - -       9 Mercapto Mix [A] - -       10 Black Rubber Mix- PPD [B] - -       11 Potassium Dichromate  -  -       12 Balsam of [place] (Myroxylon Pereirae Resin) - -       13 Nickel Sulphate Hexahydrate - -       14 Mixed Dialkyl Thiourea - -       15 Paraben Mix [B] - -       16 Methyldibromo Glutaronitrile - -       17 Fragrance Mix 8% - -       18 2-Bromo-2-Nitropropane-1,3-Diol (Bronopol) CT NA NA       19 Lyral - -       20 Tixocortol-21- Pivalate CT - -       21 Diazolidinyl urea (Germall II) - -        22 Methyl Methacrylate - -       23 Cobalt (II) Chloride Hexahydrate - -       24 Fragrance Mix II  - -       25 Compositae Mix - -       26 Benzoyl Peroxide - -       27 Bacitracin - -       28 Formaldehyde - -       29 Methylchloroisothiazolinone / Methylisothiazolinone - -       30 Corticosteroid Mix CT NA NA       31 Sodium Lauryl Sulfate - -       32 Lanolin Alcohol - -       33 Turpentine - -       34 Cetylstearylalcohol - -       35 Chlorhexidine Dicluconate - -       36 Budesonide - -       37 Imidazolidinyl Urea  - -       38 Ethyl-2 Cyanoacrylate - -       39 Quaternium 15 (Dowicil 200) - -       40 Decyl Glucoside - -       PRESERVATIVES & ANTIMICROBIALS        # Substance 2 days 4 days remarks   41 1  1,2-Benzisothiazoline-3-One, Sodium Salt - -     2  1,3,5-Kasi (2-Hydroxyethyl) - Hexahydrotriazine (Grotan BK) - -     3 1-Nkrijfksrxqba-0-Nitro-1, 3-Propanediol - -     4  3, 4, 4' - Triclocarban - -    45 5 4 - Chloro - 3 - Cresol - -     6 4 - Chloro - 4 - Xylenol (PCMX) - -     7 7-Ethylbicyclooxazolidine (Bioban CY1138) - -     8 Benzalkonium Chloride CT - -     9 Benzyl Alcohol - -    50 10 Cetalkonium Chloride - -     11 Cetylpyrimidine Chloride  - -     12 Chloroacetamide - -     13 DMDM Hydantoin - -     14 Glutaraldehyde - -    55 15 Triclosan - -     16 Glyoxal Trimeric Dihydrate - -     17 Iodopropynyl Butylcarbamate - -     18 Octylisothiazoline - -      19 Bithionol CT - -    60 20 Bioban P 1487 (Nitrobutyl) Morpholine/(Ethylnitro-Trimethylene) Dimorpholine - -     21 Phenoxyethanol - -     22 Phenyl Salicylate - -     23 Povidone Iodine - -     24 Sodium Benzoate - -    65 25 Sodium Disulfite - -     26 Sorbic Acid - -     27 Thimerosal - -     28 Melamine Formaldehyde Resin - -     29 Ethylenediamine Dihydrochloride - -      Parabens      70 30 Butyl-P-Hydroxybenzoate - -     31 Ethyl-P-Hydroxybenzoate NA NA     32 Methyl-P-Hydroxybenzoate - -    73 33 Propyl-P-Hydroxybenzoate - -      EMULSIFIERS & ADDITIVES       # Substance 2 days 4 days remarks   74 1 Polyethylene Glycol-400 - -    75 2 Cocamidopropyl Betaine - -     3 Amerchol L101 - -     4 Propylene Glycol - -     5 Triethanolamine - -     6 Sorbitane Sesquiolate CT - -    80 7 Isopropylmyristate - -     8 Polysorbate 80 CT - -     9 Amidoamine   (Stearamidopropyl Dimethylamine) - -     10 Oleamidopropyl Dimethylamine - -     11 Lauryl Glucoside - -    85 12 Coconut Diethanolamide  - -     13 2-Hydroxy-4-Methoxy Benzophenone (Oxybenzone) - -     14 Benzophenone-4 (Sulisobenzon) - -     15 Propolis - -     16 Dexpanthenol - -    90 17 Abitol - -     18 Tert-Butylhydroquinone - -     19 Benzyl Salicylate - -     20 Dimethylaminopropylamin (DMPA) - -     21 Zinc Pyrithione (Zinc Omadine)  - -    95 22 Kasi(Hydroxymethyl) Nitromethane  - -      Antioxidant       23 Dodecyl Gallate - -     24 Butylhydroxyanisole (BHA) - -     25 Butylhydroxytoluene (BHT) - -     26 Di-Alpha-Tocopherol (Vit E) - -    100 27 Propyl Gallate - -      PERFUMES, FLAVORS & PLANTS        # Substance 2 days 4 days remarks   101 1 Benzyl Cinnamate - -     2 Di-Limonene (Dipentene) - -     3 Cananga Odorata (Yisel Morillo) (I) - -     4 Lichen Acid Mix - -    105 5 Mentha Piperita Oil (Peppermint Oil) - -     6 Sesquiterpenelactone mix - -     7 Tea Tree Oil, Oxidized - -     8 Wood Tar Mix - -     9 Abietic Acid NA NA    110 10 Lavendula  Angustifolia Oil (Lavender Oil) - -     11 Fragrance mix II CT * 14% - -      Fragrance Mix I       12 [place] Absolute - -     13 Eugenol - -     14 Geraniol - -    115 15 Hydroxycitronellal - -     16 Isoeugenol - -     17 Cinnamic Aldehyde - -     18 Cinnamic Alcohol  - -      Fragrance mix II       19 Citronellol NA NA    120 20 Alpha-Hexylcinnamic Aldehyde    - -     21 Citral - -     22 Farnesol - -    123 23 Coumarin - -      Hexylcinnamic aldehyde, Coumarin, Farnesol, Hydroxyisohexy3-cyclohexene carboxaldehyde, citral, citrolellol  HAIRDRESSER        # Substance 2 days 4 days remarks   124 1 P-Phenylenediamine -  -    125 2 P-Toluenediamine Sulphate  -  -     3 Ammonium Thioglycolate - -     4 Ammonium Persulfate - -     5 Resorcinol - -     6 3-Aminophenol - -    130 7 P-Aminophenol - -     8 Glyceryl Monothioglycolate - -    132 9 Pyrogallol - -      Results of patch tests:                         Interpretation:  - Negative                    A    = Allergic      (+) Erythema    TI   = Toxic/irritant   + E + Infiltration    RaP = Relevance at Present     ++ E/I + Papulovesicle   Rpr  = Relevance Previously     +++ E/I/P + Blister     nR   = No Relevance      [x] No relevant allergic reaction observed    [] Allergic reaction diagnosed against following allergens:      Interpretation/ remarks:   No signs for contact allergies in the patch tests. But in the clinical exam rather dry skin with signs    [] Patient information given   [] ACDS CAMP information's (# ....) to following compounds: .....   [] General information's to following compounds: ......      Assessment & Plan:    ==> Final Diagnosis:     # recurrent dermatitis on neck and trunk with Xerosis and irritant component  >> no signs for relevant contact sensitization  >> most likely intrinsic atopic dermatitis with xerosis and irritant component  * chronic illness with exacerbation, progression, side effects from treatment    # some indicators for  physical Urticaria  >> think that it can be aggravated by fish and shellfish  * stable chronic illness     # no signs for atopy  >> in prick test some signs for immediate type reaction to cod fish  --> prick tests negatives    These conclusions are made at the best of one's knowledge and belief based on the provided evidence such as patient's history and allergy test results and they can change over time or can be incomplete because of missing information's.    ==> Treatment Plan:  >> try Vanicream cream and Vanicream soap and shampoo (nothing else)  >> Protopic 0.1% 2xdaily on dry, red spots on skin    >> every morning Allegra 180mg and 10mg Hydxroxyzine at night time     ___________________________    Staff: : provider    Follow-up in Derm-Allergy clinic if necessary --> referral to Dermatology  ___________________________    I spent a total of 30 minutes with Colette Patricia during today s  visit. This time was spent discussing all the individual test results, correlating them to the clinical relevance, counseling the patient and/or coordinating care (with )

## 2023-03-10 ENCOUNTER — OFFICE VISIT (OUTPATIENT)
Dept: ALLERGY | Facility: CLINIC | Age: 63
End: 2023-03-10
Payer: COMMERCIAL

## 2023-03-10 DIAGNOSIS — L20.84 INTRINSIC ATOPIC DERMATITIS: ICD-10-CM

## 2023-03-10 DIAGNOSIS — L50.8 PHYSICAL URTICARIA: Primary | ICD-10-CM

## 2023-03-10 DIAGNOSIS — L85.3 XEROSIS CUTIS: ICD-10-CM

## 2023-03-10 PROCEDURE — 99214 OFFICE O/P EST MOD 30 MIN: CPT | Performed by: DERMATOLOGY

## 2023-03-10 RX ORDER — HYDROXYZINE HYDROCHLORIDE 10 MG/1
TABLET, FILM COATED ORAL
Qty: 60 TABLET | Refills: 1 | Status: SHIPPED | OUTPATIENT
Start: 2023-03-10 | End: 2023-06-07

## 2023-03-10 RX ORDER — TACROLIMUS 1 MG/G
OINTMENT TOPICAL
Qty: 100 G | Refills: 3 | Status: SHIPPED | OUTPATIENT
Start: 2023-03-10 | End: 2023-06-07

## 2023-03-10 RX ORDER — EMOLLIENT BASE
CREAM (GRAM) TOPICAL 2 TIMES DAILY
Qty: 454 G | Refills: 11 | Status: SHIPPED | OUTPATIENT
Start: 2023-03-10 | End: 2023-06-07

## 2023-03-10 RX ORDER — FEXOFENADINE HCL 180 MG/1
180 TABLET ORAL EVERY MORNING
Qty: 60 TABLET | Refills: 2 | Status: SHIPPED | OUTPATIENT
Start: 2023-03-10 | End: 2023-06-07

## 2023-03-10 NOTE — NURSING NOTE
Chief Complaint   Patient presents with     Allergy Testing Followup     Colette is here today for patch testing day 5     Marita Carolina RN

## 2023-03-10 NOTE — Clinical Note
3/10/2023         RE: Colette Patricia  [address]  [place] [address]        Dear Colleague,    Thank you for referring your patient, Colette Patricia, to the [facility] [place]. Please see a copy of my visit note below.    [facility] Dermato-allergology Note  Office visit  Encounter Date: Mar 10, 2023  ____________________________________________    CC: No chief complaint on file.      HPI:  (Mar 10, 2023)  Ms. Colette Patricia is a(n) 63 year old female who presents today as a return patient for allergy consultation  - Follow-up in Derm-Allergy clinic for 2nd readings and final conclusions after 4 days   - otherwise feeling well in usual state of health    Physical exam:  General: In no acute distress, well-developed, well-nourished  Eyes: no conjunctivitis  ENT: no signs of rhinitis   Pulmonary: no wheezing or coughing  Skin:Focused examination of the skin on test sites was performed = see test results below    Earlier History and Allergy exams:  (Mar 8, 2023)  - Follow-up in Derm-Allergy clinic for 1st readings of patch tests after 2 days     Earlier History and Allergy exams:  (Mar 6, 2023)  - Follow-up in Derm-Allergy clinic for patch tests as planned     Earlier History and Allergy exams:  (Oct 25, 2022)  - patient has an itchy rash on neck and shoulder and flank area  - problems since January  Skin: over the neck and upper shoulder area typical subacute eczematous lesions and some nummuar lesions on trunk and less extremities.     Past Medical History:   Patient Active Problem List   Diagnosis     Headache     Insomnia     Generalized osteoarthrosis, unspecified site     Other unspecified back disorder     Low back pain     GERD (gastroesophageal reflux disease)     Advanced directives, counseling/discussion     Mixed hyperlipidemia     Hyperlipidemia with target LDL less than 130     Gastroesophageal reflux disease, esophagitis presence not specified      Chronic left-sided low back pain without sciatica     Gastroesophageal reflux disease without esophagitis     Foraminal stenosis of lumbosacral region     Fatigue, unspecified type     Vitamin D deficiency     Osteopenia of spine     Past Medical History:   Diagnosis Date     Other unspecified back disorder        Allergies:  Allergies   Allergen Reactions     No Known Drug Allergies        Medications:  Current Outpatient Medications   Medication     emollient (VANICREAM) external cream     fexofenadine (ALLEGRA) 180 MG tablet     HYDROcodone-acetaminophen (NORCO) 5-325 MG tablet     mometasone (ELOCON) 0.1 % external ointment     pantoprazole (PROTONIX) 40 MG EC tablet     triamcinolone (ARISTOCORT HP) 0.5 % external cream     No current facility-administered medications for this visit.       Social History:    Family History:  Family History   Problem Relation Age of Onset     Family History Negative Mother      Family History Negative Father      Glaucoma No family hx of      Macular Degeneration No family hx of        Previous Labs, Allergy Tests, Dermatopathology, Imaging:  none    Referred By: Elizabeth Sarah  [facility]  [address] [place] [address]     Allergy Tests:    Past Allergy Test    Order for Future Allergy Testing:    [x] Outpatient  [] Inpatient: [facility]..../ Bed ....       Skin Atopy (atopic dermatitis) [] Yes   [x] No .........  Contact allergies:   [] Yes   [x] No ..........  Hand eczema:   [] Yes   [x] No           Leading hand:   [] R   [] L       [] Ambidextrous         Drug allergies:        [] Yes   [x] No  which?......    Urticaria/Angioedema  [] Yes   [x] No .........  Food Allergy:  [] Yes   [x] No  which?......  Pets :  [] Yes   [x] No  which?......         []  Rhinitis   [] Conjunctivitis   [] Sinusitis   [] Polyposis   [] Otitis   [] Pharyngitis         []  Postnasal drip    [x]  none  Operations:   [] Tonsils   [] Septum   [] Sinus   [] Polyposis        [] Asthma  bronchiale   [] Coughing      [x]  none  Symptoms (mostly Rhinoconjunctivitis and Asthma) aggravated by:  Season   [] I   [] II   [] III   [] IV   []V   []VI   []VII   []VIII   []IX   []X   []XI   []XII     [] perennial   Day time      [] morning   [] noon      [] evening        [] night    [] whole day........  []  none  Location/changes    [] inside        [] outside   [] mountains    [] sea     [] others.............   []  none  Triggers, specific     [] animals     [] plants     [] dust              [] others ...........................    []  none  Triggers, others       [] work          [] psyche    [] sport            [] others .............................  []  none  Irritant                [] phys efforts [] smoke    [] heat/cold     [] odors  []others............... []  none    Order for PATCH TESTS  Reason for tests (suspected allergy): suspicion for allergic contact dermatitis  Known previous allergies: none  Standardized panels  [x] Standard panel (40 tests)  [x] Preservatives & Antimicrobials (31 tests)  [x] Emulsifiers & Additives (25 tests)   [x] Perfumes/Flavours & Plants (25 tests)  [x] Hairdresser panel (12 tests)  [] Rubber Chemicals (22 tests)  [] Plastics (26 tests)  [] Colorants/Dyes/Food additives (20 tests)  [] Metals (implants/dental) (24 tests)  [] Local anaesthetics/NSAIDs (13 tests)  [] Antibiotics & Antimycotics (14 tests)   [] Corticosteroids (15 tests)   [] Photopatch test (62 tests)   [] others: ...      [] Patient's own products: ...    DO NOT test if chemical or biological identity is unknown!     always ask from patient the product information and safety sheets (MSDS)       Order for PRICK TESTS    Reason for tests (suspected allergy): atopy? No signs  Known previous allergies: none    Standardized prick panels  [x] Atopic panel (20 tests)  [] Pediatric Panel (12 tests)  [] Milk, Meat, Eggs, Grains (20 tests)   [] Dust, Epithelia, Feathers (10 tests)  [] Fish, Seafood, Shellfish  (17 tests)  [] Nuts, Beans (14 tests)  [] Spice, Vegetable, Fruit (17 tests)  [] Pollen Panel = Tree, Grass, Weed (24 tests)  [] Others: ...      [] Patient's own products: ...    DO NOT test if chemical or biological identity is unknown!     always ask from patient the product information and safety sheets (MSDS)     Standardized intradermal tests  [] Penicillium notatum [] Aspergillus fumigatus [] House dust mites D.far & D. pteron  [] Cat    [] dog  [] Others: ...  [] Bee venom   [] Wasp venom  !!Specific protocol with dilutions!!       Order for Drug allergy tests (prick & Intradermal & patch tests)    [] Penicillin G  [] Ampicillin [] Cefazolin   [] Ceftriaxone   [] Ceftazidime  [] Bactrim    [] Others: ...  Order for ... as test date    Atopy Screen (Placed Oct 25, 2022)  No Substance Readings (15 min) Evaluation   POS Histamine 1mg/ml ++    NEG NaCl 0.9% -      No Substance Readings (15 min) Evaluation   1 Alternaria alternata (tenuis)  -    2 Cladosporium herbarum -    3 Aspergillus fumigatus -    4 Penicillium notatum -    5 Dermatophagoides pteronyssinus -    6 Dermatophagoides farinae -    7 Dog epithelium (canis spp) -    8 Cat hair (jazzy catus) -    9 Cockroach   (Blatella americana & germanica) -    10 Grass mix midwest   (Ann, Orchard, Redtop, Jay Jay) -    11 Stef grass (sorghum halepense) -    12 Weed mix   (common Cocklebur, Lamb s quarters, rough redroot Pigweed, Dock/Sorrel) -    13 Mug wort (artemisia vulgare) -    14 Ragweed giant/short (ambrosia spp) -    15 White birch (Betula papyrifera) -    16 Tree mix 1 (Pecan, Maple BHR, Oak RVW, american [place], black Laura) -    17 Red cedar (juniperus virginia) -    18 Tree mix 2   (white [place], river/red Birch, black Doyle, common Deuel, american Elm) -    19 Box elder/Maple mix (acer spp) -    20 [place] shagbark (carya ovata) -           Conclusion: basically all tests negatives    Fish and Seafood (Placed Mar 6, 2023)   No Substance  Readings (15min) Evaluation   POS Histamine 1mg/ml ++    NEG NaCl 0.9% -      No Substance Readings (15min) Evaluation   1 Codfish (gadus morhua) +/++    2 Flounder (platichthys spp) -    3 Tuna (thunnus albecarus) -    4 Bass (centropristis striata) -    5 Mackerel (scomberomorus cavalla) -    6 Halibut (hipoglossus hipoglossus) -    7 [place] (salmo jose) -    8 Catfish (ictalurus spp) -    9 Perch (serranus scriba) -    10 [place], [place] (oncorhynchus mykiss) -    11 Crab (callinectes spp) -    12 Lobster (homarus americanus) -    13 Shrimp white/brown/pink (farfantepenaeus&litopenaeus) -    14 Kingcrab (paralithodes camtschatica) -    15 Scallop (placopecten magellanicus) -    16 Clam (mercenaria mercenaria) -    17 Oyster (cstrea/crassostrea) -    Conclusion: some reaction to the cod fish, but not to other fish and shellfish    Milk, Meat, Egg, and Grains (Placed Mar 8, 2023)  No Substance Readings (15min) Evaluation   POS Histamine 1mg/ml ++    NEG NaCl 0.9% -      No Substance Readings (15min) Evaluation   1 Cows milk whole (billie zina) -    2 Casein (billie zina) -    3 Beef (billie zina) -    4 Goat milk -    5 Lamb (ovis aakash) -    6 Egg white (gallus gallus) -    7 Egg yolk (gallus gallus) -    8 Chicken (gallus gallus) -    9 Turkey (meleagris gallopavo) -    10 Pork (silvestre scrofa) -    11 Barley (hordeum vulgare) -    12 Buckwheat (fagopyrum esculentum) -    13 Corn (elmer mais) -    14 Hops (humulus lupulus)  -    15 Malt (hordeum vulgare)  -    16 Oat (tita sativa) -    17 Rice (oryza sativa) -    18 Rye (secale cereale) -    19 Whole wheat (triticum aestivum) -    20  White wheat flour - Patients own    Conclusion:     ________________________________    RESULTS & EVALUATION of PATCH TESTS    Mar 6, 2023 application of patch tests:    Patch test readings after     [x] 2 days, [] 3 days [x] 4 days, [] 5 days,  Other duration: ...      STANDARD Series                                          # Substance 2  days 4 days remarks     1 Aries Mix [C] - -       2 Colophony - -       3  2-Mercaptobenzothiazole  - -       4 Methylisothiazolinone - -       5 Carba Mix - -       6 Thiuram Mix [A] - -       7 Bisphenol A Epoxy Resin - -       8 U-Udze-Yvrjhfsimzl-Formaldehyde Resin - -       9 Mercapto Mix [A] - -       10 Black Rubber Mix- PPD [B] - -       11 Potassium Dichromate  -  -       12 Balsam of [place] (Myroxylon Pereirae Resin) - -       13 Nickel Sulphate Hexahydrate - -       14 Mixed Dialkyl Thiourea - -       15 Paraben Mix [B] - -       16 Methyldibromo Glutaronitrile - -       17 Fragrance Mix 8% - -       18 2-Bromo-2-Nitropropane-1,3-Diol (Bronopol) CT NA NA       19 Lyral - -       20 Tixocortol-21- Pivalate CT - -       21 Diazolidinyl urea (Germall II) - -        22 Methyl Methacrylate - -       23 Cobalt (II) Chloride Hexahydrate - -       24 Fragrance Mix II  - -       25 Compositae Mix - -       26 Benzoyl Peroxide - -       27 Bacitracin - -       28 Formaldehyde - -       29 Methylchloroisothiazolinone / Methylisothiazolinone - -       30 Corticosteroid Mix CT NA NA       31 Sodium Lauryl Sulfate - -       32 Lanolin Alcohol - -       33 Turpentine - -       34 Cetylstearylalcohol - -       35 Chlorhexidine Dicluconate - -       36 Budesonide - -       37 Imidazolidinyl Urea  - -       38 Ethyl-2 Cyanoacrylate - -       39 Quaternium 15 (Dowicil 200) - -       40 Decyl Glucoside - -       PRESERVATIVES & ANTIMICROBIALS        # Substance 2 days 4 days remarks   41 1  1,2-Benzisothiazoline-3-One, Sodium Salt - -     2  1,3,5-Kasi (2-Hydroxyethyl) - Hexahydrotriazine (Grotan BK) - -     3 0-Bvmbmfzqohqjc-0-Nitro-1, 3-Propanediol - -     4  3, 4, 4' - Triclocarban - -    45 5 4 - Chloro - 3 - Cresol - -     6 4 - Chloro - 4 - Xylenol (PCMX) - -     7 7-Ethylbicyclooxazolidine (Bioban [id number]) - -     8 Benzalkonium Chloride CT - -     9 Benzyl Alcohol - -    50 10 Cetalkonium Chloride - -     11  Cetylpyrimidine Chloride  - -     12 Chloroacetamide - -     13 DMDM Hydantoin - -     14 Glutaraldehyde - -    55 15 Triclosan - -     16 Glyoxal Trimeric Dihydrate - -     17 Iodopropynyl Butylcarbamate - -     18 Octylisothiazoline - -     19 Bithionol CT - -    60 20 Bioban P 1487 (Nitrobutyl) Morpholine/(Ethylnitro-Trimethylene) Dimorpholine - -     21 Phenoxyethanol - -     22 Phenyl Salicylate - -     23 Povidone Iodine - -     24 Sodium Benzoate - -    65 25 Sodium Disulfite - -     26 Sorbic Acid - -     27 Thimerosal - -     28 Melamine Formaldehyde Resin - -     29 Ethylenediamine Dihydrochloride - -      Parabens      70 30 Butyl-P-Hydroxybenzoate - -     31 Ethyl-P-Hydroxybenzoate NA NA     32 Methyl-P-Hydroxybenzoate - -    73 33 Propyl-P-Hydroxybenzoate - -      EMULSIFIERS & ADDITIVES       # Substance 2 days 4 days remarks   74 1 Polyethylene Glycol-400 - -    75 2 Cocamidopropyl Betaine - -     3 Amerchol L101 - -     4 Propylene Glycol - -     5 Triethanolamine - -     6 Sorbitane Sesquiolate CT - -    80 7 Isopropylmyristate - -     8 Polysorbate 80 CT - -     9 Amidoamine   (Stearamidopropyl Dimethylamine) - -     10 Oleamidopropyl Dimethylamine - -     11 Lauryl Glucoside - -    85 12 Coconut Diethanolamide  - -     13 2-Hydroxy-4-Methoxy Benzophenone (Oxybenzone) - -     14 Benzophenone-4 (Sulisobenzon) - -     15 Propolis - -     16 Dexpanthenol - -    90 17 Abitol - -     18 Tert-Butylhydroquinone - -     19 Benzyl Salicylate - -     20 Dimethylaminopropylamin (DMPA) - -     21 Zinc Pyrithione (Zinc Omadine)  - -    95 22 Kasi(Hydroxymethyl) Nitromethane  - -      Antioxidant       23 Dodecyl Gallate - -     24 Butylhydroxyanisole (BHA) - -     25 Butylhydroxytoluene (BHT) - -     26 Di-Alpha-Tocopherol (Vit E) - -    100 27 Propyl Gallate - -      PERFUMES, FLAVORS & PLANTS        # Substance 2 days 4 days remarks   101 1 Benzyl Cinnamate - -     2 Di-Limonene (Dipentene) - -     3 Cananga  Odorata (Yisel Morillo) (I) - -     4 Lichen Acid Mix - -    105 5 Mentha Piperita Oil (Peppermint Oil) - -     6 Sesquiterpenelactone mix - -     7 Tea Tree Oil, Oxidized - -     8 Wood Tar Mix - -     9 Abietic Acid NA NA    110 10 Lavendula Angustifolia Oil (Lavender Oil) - -     11 Fragrance mix II CT * 14% - -      Fragrance Mix I       12 [place] Absolute - -     13 Eugenol - -     14 Geraniol - -    115 15 Hydroxycitronellal - -     16 Isoeugenol - -     17 Cinnamic Aldehyde - -     18 Cinnamic Alcohol  - -      Fragrance mix II       19 Citronellol NA NA    120 20 Alpha-Hexylcinnamic Aldehyde    - -     21 Citral - -     22 Farnesol - -    123 23 Coumarin - -      Hexylcinnamic aldehyde, Coumarin, Farnesol, Hydroxyisohexy3-cyclohexene carboxaldehyde, citral, citrolellol  HAIRDRESSER        # Substance 2 days 4 days remarks   124 1 P-Phenylenediamine -  -    125 2 P-Toluenediamine Sulphate  -  -     3 Ammonium Thioglycolate - -     4 Ammonium Persulfate - -     5 Resorcinol - -     6 3-Aminophenol - -    130 7 P-Aminophenol - -     8 Glyceryl Monothioglycolate - -    132 9 Pyrogallol - -      Results of patch tests:                         Interpretation:  - Negative                    A    = Allergic      (+) Erythema    TI   = Toxic/irritant   + E + Infiltration    RaP = Relevance at Present     ++ E/I + Papulovesicle   Rpr  = Relevance Previously     +++ E/I/P + Blister     nR   = No Relevance      [x] No relevant allergic reaction observed    [] Allergic reaction diagnosed against following allergens:      Interpretation/ remarks:   No signs for contact allergies in the patch tests. But in the clinical exam rather dry skin with signs    [] Patient information given   [] ACDS CAMP information's (# ....) to following compounds: .....   [] General information's to following compounds: ......      Assessment & Plan:    ==> Final Diagnosis:     # recurrent dermatitis on neck and trunk with Xerosis and irritant  component  >> no signs for relevant contact sensitization  >> most likely intrinsic atopic dermatitis with xerosis and irritant component  * chronic illness with exacerbation, progression, side effects from treatment    # some indicators for physical Urticaria  >> think that it can be aggravated by fish and shellfish  * stable chronic illness     # no signs for atopy  >> in prick test some signs for immediate type reaction to cod fish  --> prick tests negatives    These conclusions are made at the best of one's knowledge and belief based on the provided evidence such as patient's history and allergy test results and they can change over time or can be incomplete because of missing information's.    ==> Treatment Plan:  >> try Vanicream cream and Vanicream soap and shampoo (nothing else)  >> Protopic 0.1% 2xdaily on dry, red spots on skin    >> every morning Allegra 180mg and 10mg Hydxroxyzine at night time     ___________________________      [id number][facility] provider    Follow-up in Derm-Allergy clinic if necessary --> referral to Dermatology  ___________________________    I spent a total of *** minutes with Colette Patricia during today s  visit. This time was spent discussing all the individual test results, correlating them to the clinical relevance, counseling the patient and/or coordinating care and performing allergy tests or procedures.           Again, thank you for allowing me to participate in the care of your patient.        Sincerely,        Arsen Rocha MD

## 2023-03-21 ENCOUNTER — OFFICE VISIT (OUTPATIENT)
Dept: DERMATOLOGY | Facility: CLINIC | Age: 63
End: 2023-03-21
Attending: DERMATOLOGY
Payer: COMMERCIAL

## 2023-03-21 DIAGNOSIS — L20.9 ATOPIC DERMATITIS, UNSPECIFIED TYPE: Primary | ICD-10-CM

## 2023-03-21 DIAGNOSIS — L50.8 PHYSICAL URTICARIA: ICD-10-CM

## 2023-03-21 PROCEDURE — 99214 OFFICE O/P EST MOD 30 MIN: CPT | Performed by: DERMATOLOGY

## 2023-03-21 RX ORDER — TRIAMCINOLONE ACETONIDE 1 MG/G
OINTMENT TOPICAL
Qty: 80 G | Refills: 11 | Status: SHIPPED | OUTPATIENT
Start: 2023-03-21 | End: 2023-06-07

## 2023-03-21 ASSESSMENT — PAIN SCALES - GENERAL: PAINLEVEL: NO PAIN (0)

## 2023-03-21 NOTE — PROGRESS NOTES
[facility] Dermatology Note  Encounter Date: Mar 21, 2023  Office Visit     Dermatology Problem List:  1. Atopic dermatitis / urticaria.   Current: Tacrolimus on weekdays, Triamcinolone 0.1% on weekends, PO Allegra and Hydroxyzine     ____________________________________________    Assessment & Plan:    # Eczematous/atopic dermatitis, upper chest. Chronic, flare/not yet at goal.  s/p patch testing and prick testing, with suspected mixture of atopic dermatitis and physical urticaria. Improved but with persistent itchy thin plaques on the left breast. Recommended trial of topical regimen as below and consider dupixent at follow-up if not improving.   - For topicals:Tacrolimus 0.1% ointment BID PRN on weekdays and Triamcinolone 0.1% ointment BID PRN on weekends; taper frequency as able.   - Can continue Allegra 180 mg PO qdaily  - Can continue hydroxyzine 10 mg PO at bedtime PRN    Procedures Performed:   None    Follow-up: 3-4 month(s) in-person, or earlier for new or changing lesions    Staff and Scribe:     Scribe Disclosure:  I, Rm Schaeffer, am serving as a scribe to document services personally performed by Ramesh Diego MD based on data collection and the provider's statements to me.     Provider Disclosure:   The documentation recorded by the scribe accurately reflects the services I personally performed and the decisions made by me.    Ramesh Diego MD    Department of Dermatology  [facility]: Phone: [phone], Fax:[phone]  [facility]: Phone: [phone] Fax: [phone]  ____________________________________________    CC: Derm Problem (Pt reports a rash on chest and neck)    HPI:  Ms. Colette Patricia is a(n) 63 year old female who presents today as a new patient for prutusis/uticaria. She was seen by Dr. Rocha on 3/10/23. She has been taking  hydroxzine, Vanicream, and Allegra in the morning as well as protopic at night.     Patient is otherwise feeling well, without additional skin concerns.    Labs Reviewed:  N/A    Physical Exam:  Vitals: LMP 07/28/2000   SKIN: Focused examination of chest was performed.  - subtle pink scaly eczematous thin plaque on the left breast  - mild diffuse xerosis cutis  - No other lesions of concern on areas examined.     Medications:  Current Outpatient Medications   Medication     emollient (VANICREAM) external cream     emollient (VANICREAM) external cream     fexofenadine (ALLEGRA) 180 MG tablet     hydrOXYzine (ATARAX) 10 MG tablet     tacrolimus (PROTOPIC) 0.1 % external ointment     triamcinolone (KENALOG) 0.1 % external ointment     HYDROcodone-acetaminophen (NORCO) 5-325 MG tablet     mometasone (ELOCON) 0.1 % external ointment     pantoprazole (PROTONIX) 40 MG EC tablet     triamcinolone (ARISTOCORT HP) 0.5 % external cream     No current facility-administered medications for this visit.      Past Medical History:   Patient Active Problem List   Diagnosis     Headache     Insomnia     Generalized osteoarthrosis, unspecified site     Other unspecified back disorder     Low back pain     GERD (gastroesophageal reflux disease)     Advanced directives, counseling/discussion     Mixed hyperlipidemia     Hyperlipidemia with target LDL less than 130     Gastroesophageal reflux disease, esophagitis presence not specified     Chronic left-sided low back pain without sciatica     Gastroesophageal reflux disease without esophagitis     Foraminal stenosis of lumbosacral region     Fatigue, unspecified type     Vitamin D deficiency     Osteopenia of spine     Past Medical History:   Diagnosis Date     Other unspecified back disorder

## 2023-03-21 NOTE — NURSING NOTE
Chief Complaint   Patient presents with     Derm Problem     Pt reports a rash on chest and neck     Pradip Falcon, EMT

## 2023-03-21 NOTE — LETTER
3/21/2023       RE: Colette Patricia  [address]  [facility] [address]     Dear Colleague,    Thank you for referring your patient, Colette Patricia, to the [facility] [place] [facility]. Please see a copy of my visit note below.    [facility] Dermatology Note  Encounter Date: Mar 21, 2023  Office Visit     Dermatology Problem List:  1. Atopic dermatitis / urticaria.   Current: Tacrolimus on weekdays, Triamcinolone 0.1% on weekends, PO Allegra and Hydroxyzine     ____________________________________________    Assessment & Plan:    # Eczematous/atopic dermatitis, upper chest. Chronic, flare/not yet at goal.  s/p patch testing and prick testing, with suspected mixture of atopic dermatitis and physical urticaria. Improved but with persistent itchy thin plaques on the left breast. Recommended trial of topical regimen as below and consider dupixent at follow-up if not improving.   - For topicals:Tacrolimus 0.1% ointment BID PRN on weekdays and Triamcinolone 0.1% ointment BID PRN on weekends; taper frequency as able.   - Can continue Allegra 180 mg PO qdaily  - Can continue hydroxyzine 10 mg PO at bedtime PRN    Procedures Performed:   None    Follow-up: 3-4 month(s) in-person, or earlier for new or changing lesions    Staff and Scribe:     Scribe Disclosure:  I, Rm Schaeffer, am serving as a scribe to document services personally performed by Ramesh Diego MD based on data collection and the provider's statements to me.     Provider Disclosure:   The documentation recorded by the scribe accurately reflects the services I personally performed and the decisions made by me.    Ramesh Diego MD    Department of Dermatology  [facility]: Phone: [phone], Fax:[phone]  [facility]:  Phone: [phone] Fax: [phone]  ____________________________________________    CC: Derm Problem (Pt reports a rash on chest and neck)    HPI:  Ms. Colette Patricia is a(n) 63 year old female who presents today as a new patient for prutusis/uticaria. She was seen by Dr. Rocha on 3/10/23. She has been taking hydroxzine, Vanicream, and Allegra in the morning as well as protopic at night.     Patient is otherwise feeling well, without additional skin concerns.    Labs Reviewed:  N/A    Physical Exam:  Vitals: LMP 07/28/2000   SKIN: Focused examination of chest was performed.  - subtle pink scaly eczematous thin plaque on the left breast  - mild diffuse xerosis cutis  - No other lesions of concern on areas examined.     Medications:  Current Outpatient Medications   Medication     emollient (VANICREAM) external cream     emollient (VANICREAM) external cream     fexofenadine (ALLEGRA) 180 MG tablet     hydrOXYzine (ATARAX) 10 MG tablet     tacrolimus (PROTOPIC) 0.1 % external ointment     triamcinolone (KENALOG) 0.1 % external ointment     HYDROcodone-acetaminophen (NORCO) 5-325 MG tablet     mometasone (ELOCON) 0.1 % external ointment     pantoprazole (PROTONIX) 40 MG EC tablet     triamcinolone (ARISTOCORT HP) 0.5 % external cream     No current facility-administered medications for this visit.      Past Medical History:   Patient Active Problem List   Diagnosis     Headache     Insomnia     Generalized osteoarthrosis, unspecified site     Other unspecified back disorder     Low back pain     GERD (gastroesophageal reflux disease)     Advanced directives, counseling/discussion     Mixed hyperlipidemia     Hyperlipidemia with target LDL less than 130     Gastroesophageal reflux disease, esophagitis presence not specified     Chronic left-sided low back pain without sciatica     Gastroesophageal reflux disease without esophagitis     Foraminal stenosis of lumbosacral region     Fatigue, unspecified type      Vitamin D deficiency     Osteopenia of spine     Past Medical History:   Diagnosis Date     Other unspecified back disorder

## 2023-03-27 ENCOUNTER — TELEPHONE (OUTPATIENT)
Dept: PEDIATRICS | Facility: CLINIC | Age: 63
End: 2023-03-27

## 2023-03-27 ENCOUNTER — OFFICE VISIT (OUTPATIENT)
Dept: PEDIATRICS | Facility: CLINIC | Age: 63
End: 2023-03-27
Payer: COMMERCIAL

## 2023-03-27 VITALS
DIASTOLIC BLOOD PRESSURE: 60 MMHG | HEART RATE: 72 BPM | HEIGHT: 66 IN | WEIGHT: 153 LBS | BODY MASS INDEX: 24.59 KG/M2 | SYSTOLIC BLOOD PRESSURE: 120 MMHG | OXYGEN SATURATION: 100 %

## 2023-03-27 DIAGNOSIS — M54.42 LEFT-SIDED LOW BACK PAIN WITH LEFT-SIDED SCIATICA, UNSPECIFIED CHRONICITY: Primary | ICD-10-CM

## 2023-03-27 PROCEDURE — 99213 OFFICE O/P EST LOW 20 MIN: CPT | Performed by: STUDENT IN AN ORGANIZED HEALTH CARE EDUCATION/TRAINING PROGRAM

## 2023-03-27 NOTE — TELEPHONE ENCOUNTER
Daughter, Peggy, calling to request an order for an MRI for her mother. Daughter states she is very upset with how patient's visit went. She was told to take her mom to the ER after patient had just gone to ER. She does not want to take her there again if she had just gone in on 03/25/2023.     Expressed apologies and notified we will have to huddle with provider.     Daughter is requesting orders ASAP so that she can get her mom scheduled for an MRI before office closes today.     Callback at [phone] and OK to leave detailed VM.     Huddled with Dr. Brown, provider verbally stated that patient needs to go to the ER to get an MRI or can go to [facility].     Daughter is still refusing to take her mother there. She states that this has become a race/Buddhism issue, that we are racially profiling the patient and refusing care based on this. Daughter became very upset and stated she wanted to speak to the doctor.    Offered that she can speak to patient relations but daughter kept asking for MRI order. When telling the daughter that the doctor will not place an order, daughter hung up.     Viola Moore MA 4:50 PM 3/27/2023

## 2023-03-27 NOTE — TELEPHONE ENCOUNTER
Attempted to reach patient, LVMTCB. See provider message below. Per chart review, patient was in ED 3/25/23 r/t back pain. Per follow up information from ED:     Follow-up Information   Follow up With Specialties Details Why Contact [facility] Spine Surgery - Orthopedics Schedule an appointment as soon as possible for a visit in 1 week [address] [facility] [address]  [phone]    [facility] Emergency Department Emergency If symptoms worsen [address] [address]  [phone]       Ovi GALAN RN 3/27/2023 at 1:31 PM

## 2023-03-27 NOTE — TELEPHONE ENCOUNTER
Patient seen for OV 3/27/23.   Ovi GALAN RN 3/27/2023 at 3:52 PM     Pulses equal bilaterally, no edema present.

## 2023-03-27 NOTE — PROGRESS NOTES
"  Assessment & Plan     Left-sided low back pain with left-sided sciatica, unspecified chronicity  Patient with acute left sided low back pain and symptoms of neuro deficit. I had tried to have triage nurses call patient to recommend ED (emergency department) visit prior to this office visit as given bladder incontinence I recommend MRI. Per ED (emergency department) note they had been referred to [facility]. When I recommend MRI and evaluation in ED (emergency department) or orthopedic urgent care for expedited imaging daughter became very upset and left the visit room. I am concerned for disc herniation, compression fracture, mass, muscle strain but given bladder incontinence and neuro symptoms recommended imaging more urgently.       Yolanda Brown MD  [facility] TEMI Alvarenga is a 63 year old, presenting for the following health issues:  Hospital F/U  No flowsheet data found.  HPI     ED/UC Followup:  Facility:  [facility] Emergency Department  Date of visit: 03/25/2023  Reason for visit: Acute back pain, unspecified back location, unspecified back pain   Current Status: Pt is not feeling any better   Patient accompanied by daughter. Declined  services.  Report pain/numbness in left lower back and into entire leg  Also episode of bladder incontinence          Objective    /60 (BP Location: Right arm, Patient Position: Sitting, Cuff Size: Adult Regular)   Pulse 72   Ht 1.676 m (5' 6\")   Wt 69.4 kg (153 lb)   LMP 07/28/2000   SpO2 100%   BMI 24.69 kg/m    Body mass index is 24.69 kg/m .  Physical Exam   GEN: Alert and appropriately interactive for age  EYES: Eyes grossly normal to inspection, conjunctivae and sclerae normal  RESP: Breathing comfortably on room air   CV: Warm and well perfused peripheral extremities   MS: no gross musculoskeletal defects noted, no edema  NEURO: Alert and oriented. Gait abnormal with leaning to left side " and holding left back. Speech fluent.    PSYCH: Affect normal/bright. Appearance well groomed.

## 2023-03-27 NOTE — PATIENT INSTRUCTIONS
[facility] Spine Surgery -   Orthopedics Schedule an appointment as soon as possible for a visit in 1 week [address] [facility] [id number] [phone]

## 2023-03-27 NOTE — TELEPHONE ENCOUNTER
This was just put on my schedule. If patient is having nerve symptoms really needs to go to ED (emergency department) or orthopedic urgent care that has MRI capability.    Yolanda Brown MD  Internal Medicine & Pediatrics  [facility] [place]  She/her

## 2023-03-28 ENCOUNTER — TRANSFERRED RECORDS (OUTPATIENT)
Dept: HEALTH INFORMATION MANAGEMENT | Facility: CLINIC | Age: 63
End: 2023-03-28

## 2023-04-14 ENCOUNTER — PATIENT OUTREACH (OUTPATIENT)
Dept: GERIATRIC MEDICINE | Facility: CLINIC | Age: 63
End: 2023-04-14
Payer: COMMERCIAL

## 2023-04-14 NOTE — PROGRESS NOTES
[facility] Care Coordination Contact    Member became effective with  [facility]  Previous Health Plan: [facility]  Previous Care System: [facility]  Previous care coordinators name and number: Amanda Giron - no # available  Waiver Type: N/A  Last MMIS Entry: Date 10/28/2022 and Type pers assmt  MMIS visit date (and type) if different from above: 11/21/2021 [facility]  Services Listed in MMIS:   UTF received: Yes - saved to member file  Address/Phone discrepancy: Epic and [place] are different. Member is from our not established list and per [place].    Jeanine Ledbetter  Care Management Specialist  [facility]  [phone]

## 2023-04-14 NOTE — LETTER
April 14, 2023    ANISH ROMERO  [address] [address]      Dear Anish,    Welcome to [facility] health program. My name is Brandy Montiel RN. I am your [facility] care coordinator. You're eligible for care coordination through your [facility] Product.    As your care coordinator, we ll:  Meet to go over your care coordination benefits  Talk about your physical and mental health care needs   Review your preventative care needs  Create a plan that meets your needs with the services you choose    What happens next?  I ll call you soon to introduce myself and tell you more about my role. We ll then plan time to go over your health and safety needs. Our goal is to keep you as healthy and independent as possible.    [facility] combines the benefits you may already have receive from Medical Assistance, Medicare and the Prescription Drug Coverage Program. Soon you'll receive a new member identification (ID) card from [facility]. Use this card whenever you get health services.    The [facility] care coordination program is voluntary and offered to you at no cost. If you wish to stop being in the care coordination program or have questions, call me at [phone]. If you reach my voicemail, leave a message and your phone number. TTY users, call the [place] Relay at 914 or [phone] (linjiv-oz-fiixja relay service).    Sincerely,    Brandy Montiel RN    Email: [email or web address]  Phone: [phone]      [facility] ([facility]) is a health plan that contracts with both Medicare and the [place] Medical Assistance (Medicaid) program to provide benefits of both programs to enrollees. Enrollment in [facility] depends on contract renewal.    [id number] accepted   [id number]         [id number] (07/2022)

## 2023-04-24 ENCOUNTER — PATIENT OUTREACH (OUTPATIENT)
Dept: GERIATRIC MEDICINE | Facility: CLINIC | Age: 63
End: 2023-04-24
Payer: COMMERCIAL

## 2023-04-24 NOTE — PROGRESS NOTES
[facility] Care Coordination Contact  CC left a message with member as 3rd out reach to contact member. Requesting for letter to be mailed to member. Also send generic email to member on email listed in Epic notes.   Brandy Montiel RN  [facility]  P: [phone]  Fax: [phone]

## 2023-04-24 NOTE — LETTER
April 24, 2023      ANISH ROMERO  [address] [facility] [address]  [facility] [address]        Dear Anish:     I m your care coordinator. I ve been unable to reach you by phone. I am writing to ask you or an authorized representative to call me at [phone]. If you reach my voicemail, please leave a message with your daytime telephone number. . Include a date and time that I can call you. If you are hearing impaired, call the [place] Relay at 779 or [phone] (bqojps-np-ymkwnl relay service).     The reason I am trying to reach you is:    [] To schedule an assessment  [] For your six (6)-month check-in  [x] Other:  Initial Health Risk Assessment    Please call me as soon as you receive this letter. I look forward to speaking with you.    Sincerely,    Brandy Montiel RN    Email: [email or web address]  Phone: [phone]      [facility] ([facility]) is a health plan that contracts with both Medicare and the [place] Medical Assistance (Medicaid) program to provide benefits of both programs to enrollees. Enrollment in [employer] depends on contract renewal.    [id number] accepted  [id number]                                                                         A (08/2022)

## 2023-04-24 NOTE — PROGRESS NOTES
"[facility] Care Coordination Contact    Per CC, mailed client an \"Unable to Contact\" letter.    Jeanine Ledbetter  Care Management Specialist  [facility]  [phone]      " no lesions,  no deformities,  no traumatic injuries,  no significant scars are present,  chest wall non-tender,  no masses present, breathing is unlabored without accessory muscle use,normal breath sounds

## 2023-04-25 ENCOUNTER — PATIENT OUTREACH (OUTPATIENT)
Dept: GERIATRIC MEDICINE | Facility: CLINIC | Age: 63
End: 2023-04-25
Payer: COMMERCIAL

## 2023-04-25 NOTE — PROGRESS NOTES
[facility] Care Coordination Contact      [facility] Refusal Telephone Assessment    Member refused home visit HRA on 4/25/23 (reason: reports independence and not needing/wanting assistance at this time).    ER visits: No  Hospitalizations: Yes- [facility] due to back pain  Health concerns: None at this time.   Falls/Injuries: No  ADL/IADL Dependencies: none- reports independence         Member currently receiving the following home care services:   None  Member currently receiving the following community resources:  none  Informal support(s):  Family as needed.     Advanced Care Planning discussion, complete code section.    [facility] Health Plan sponsored benefits: Shared information re: Silver Sneakers/gym memberships, ASA, Calcium +D.    Follow-Up Plan: Member informed of future contact, plan to f/u with member with a 6 month telephone assessment and offer a home visit.  Contact information shared with member and family, encouraged member to call with any questions or concerns at any time.    This CC note routed to PCP.  Brandy Montiel RN  [facility]  P: [phone]  Fax: [phone]

## 2023-04-25 NOTE — LETTER
May 1, 2023    ANISH ROMERO  [address] [address]        Dear Anish:    As a member of [facility], we offer a health risk assessment at no cost to you. I know you don't want to have the assessment right now. If you change your mind, please call me at the number below.    Who performs the health risk assessment?  A [facility] Care Coordinator performs the assessment. Our Care Coordinators can also help you understand your benefits. They can tell you about services to aid you at home, such as managing your care with your doctors if your health worsens.    Our Care Coordinators are here for you if you need:  Support for activities you used to do by yourself (including making meals, bathing, and paying bills)  Equipment for bathroom or home safety  Help finding a new place to live  Information on staying healthy, preventing falls and immunizations    Questions?  If you have questions, or you would like to do the assessment, call me at [phone]. TTY users call [phone]. I'm here from 8am to 5pm. I may reach out to you again soon.      Sincerely,        Brandy Montiel RN    Email: [email or web address]  Phone: [phone]      [employer]    <[id number] accepted    [id number] (12/2021)  [id number]>

## 2023-04-25 NOTE — Clinical Note
Hi,  I am the [facility] care coordinator for Colette Patricia I am writing to inform you member refused home visit at this time reporting independence with care needs. You were listed in member care team as PCP as well. CC will continue to reach out and offer assistance ongoing.   All of my documentation can be found in EPIC. Please do not hesitate to contact me with any questions or concerns. Brandy Montiel RN [facility] P: [phone] Fax: [phone]

## 2023-04-26 NOTE — PROGRESS NOTES
[facility] Care Coordination Contact    Seen conflicting address information- MnIGEM has member living in [place], Epic has member listed in [place]. Confirmed with member she resides at [place] location.   [address]160 sent to [facility] to update.   Brandy Montiel RN  [facility]  P: [phone]  Fax: [phone]

## 2023-05-01 NOTE — PROGRESS NOTES
"[facility] Care Coordination Contact    Per CC, mailed client a \"Refusal of Home Visit\" letter. Included Client Quick Contact list (includes [employer] info).    Emailed [facility] to request they send a dental kit and healthy savings card to member.  Faxed [employer] form to [facility].    Jeanine Ledbetter  Care Management Specialist  [facility]  [phone]        "

## 2023-06-07 ENCOUNTER — OFFICE VISIT (OUTPATIENT)
Dept: INTERNAL MEDICINE | Facility: CLINIC | Age: 63
End: 2023-06-07
Payer: COMMERCIAL

## 2023-06-07 VITALS
DIASTOLIC BLOOD PRESSURE: 64 MMHG | SYSTOLIC BLOOD PRESSURE: 118 MMHG | WEIGHT: 149 LBS | RESPIRATION RATE: 18 BRPM | BODY MASS INDEX: 23.95 KG/M2 | HEIGHT: 66 IN | TEMPERATURE: 98.8 F | HEART RATE: 81 BPM | OXYGEN SATURATION: 97 %

## 2023-06-07 DIAGNOSIS — R53.1 WEAKNESS: Primary | ICD-10-CM

## 2023-06-07 DIAGNOSIS — E78.2 MIXED HYPERLIPIDEMIA: ICD-10-CM

## 2023-06-07 DIAGNOSIS — M89.9 DISORDER OF BONE, UNSPECIFIED: ICD-10-CM

## 2023-06-07 DIAGNOSIS — Z12.11 SCREEN FOR COLON CANCER: ICD-10-CM

## 2023-06-07 PROCEDURE — 99214 OFFICE O/P EST MOD 30 MIN: CPT | Performed by: INTERNAL MEDICINE

## 2023-06-07 ASSESSMENT — PAIN SCALES - GENERAL: PAINLEVEL: NO PAIN (0)

## 2023-06-07 NOTE — PROGRESS NOTES
"  Assessment & Plan     Weakness  - Comprehensive metabolic panel; Future  - CBC with platelets; Future  - TSH; Future  - Vitamin D Deficiency; Future    Mixed hyperlipidemia  - Lipid panel reflex to direct LDL Fasting; Future    Screen for colon cancer  - Fecal colorectal cancer screen FIT - Future (S+30); Future    Disorder of bone, unspecified  - Vitamin D Deficiency; Future    Discussion:  To complete initial lab work including CMP, CBC to rule out any underlying anemia, TSH to rule out any underlying thyroid concerns and vitamin D levels for any underlying vitamin D deficiency concerns.  Pending lab results, patient may be prescribed appropriate supplementation if needed.  Additionally, we also discussed underlying Cancer screening including mammogram as well as colon cancer screening.  Patient refused for the mammogram order to be placed.  Discussed with the patient on the importance of mammogram screening and patient does not want to do it for now.  Patient feels comfortable to go ahead with colon cancer screening as long as it is a stool test.  Fit test ordered.      Lillian Hansen MD  [facility] ROSA Alvarenga is a 63 year old, presenting for the following health issues:  Weight Loss        6/7/2023     1:32 PM   Additional Questions   Roomed by Baldo     HPI     Weakness for almost one year. Seems like symptoms are slowly getting worse.  No body aches/recent falls, chills/fevers, unintentional weight loss.  Denies any low moods/depression symptoms.no bowel movement changes or appetite changes.      Review of Systems   Constitutional, HEENT, cardiovascular, pulmonary, gi and gu systems are negative, except as otherwise noted.      Objective    /64   Pulse 81   Temp 98.8  F (37.1  C) (Tympanic)   Resp 18   Ht 1.676 m (5' 6\")   Wt 67.6 kg (149 lb)   LMP 07/28/2000   SpO2 97%   BMI 24.05 kg/m    Body mass index is 24.05 kg/m .  Physical Exam   GENERAL: healthy, alert " and no distress  RESP: lungs clear to auscultation - no rales, rhonchi or wheezes  CV: regular rate and rhythm, normal S1 S2  ABDOMEN: soft, nontender, no hepatosplenomegaly, no masses  MS: no gross musculoskeletal defects noted, no edema  NEURO: Normal strength and tone, mentation intact and speech normal  PSYCH: mentation appears normal, affect normal/bright

## 2023-06-08 ENCOUNTER — LAB (OUTPATIENT)
Dept: LAB | Facility: CLINIC | Age: 63
End: 2023-06-08
Payer: COMMERCIAL

## 2023-06-08 DIAGNOSIS — E78.2 MIXED HYPERLIPIDEMIA: ICD-10-CM

## 2023-06-08 DIAGNOSIS — M89.9 DISORDER OF BONE, UNSPECIFIED: ICD-10-CM

## 2023-06-08 DIAGNOSIS — R53.1 WEAKNESS: ICD-10-CM

## 2023-06-08 DIAGNOSIS — Z12.11 SCREEN FOR COLON CANCER: ICD-10-CM

## 2023-06-08 LAB
DEPRECATED CALCIDIOL+CALCIFEROL SERPL-MC: 38 UG/L (ref 20–75)
ERYTHROCYTE [DISTWIDTH] IN BLOOD BY AUTOMATED COUNT: 17 % (ref 10–15)
HCT VFR BLD AUTO: 29.1 % (ref 35–47)
HGB BLD-MCNC: 8.9 G/DL (ref 11.7–15.7)
MCH RBC QN AUTO: 25.4 PG (ref 26.5–33)
MCHC RBC AUTO-ENTMCNC: 30.6 G/DL (ref 31.5–36.5)
MCV RBC AUTO: 83 FL (ref 78–100)
PLATELET # BLD AUTO: 265 10E3/UL (ref 150–450)
RBC # BLD AUTO: 3.5 10E6/UL (ref 3.8–5.2)
WBC # BLD AUTO: 5.5 10E3/UL (ref 4–11)

## 2023-06-08 PROCEDURE — 36415 COLL VENOUS BLD VENIPUNCTURE: CPT | Performed by: INTERNAL MEDICINE

## 2023-06-08 PROCEDURE — 85027 COMPLETE CBC AUTOMATED: CPT | Performed by: INTERNAL MEDICINE

## 2023-06-08 PROCEDURE — 80053 COMPREHEN METABOLIC PANEL: CPT | Performed by: INTERNAL MEDICINE

## 2023-06-08 PROCEDURE — 82306 VITAMIN D 25 HYDROXY: CPT | Performed by: INTERNAL MEDICINE

## 2023-06-08 PROCEDURE — 80061 LIPID PANEL: CPT | Performed by: INTERNAL MEDICINE

## 2023-06-08 PROCEDURE — 84443 ASSAY THYROID STIM HORMONE: CPT | Performed by: INTERNAL MEDICINE

## 2023-06-09 DIAGNOSIS — D50.8 OTHER IRON DEFICIENCY ANEMIA: Primary | ICD-10-CM

## 2023-06-09 LAB
ALBUMIN SERPL BCG-MCNC: 4.1 G/DL (ref 3.5–5.2)
ALP SERPL-CCNC: 59 U/L (ref 35–104)
ALT SERPL W P-5'-P-CCNC: 15 U/L (ref 10–35)
ANION GAP SERPL CALCULATED.3IONS-SCNC: 11 MMOL/L (ref 7–15)
AST SERPL W P-5'-P-CCNC: 22 U/L (ref 10–35)
BILIRUB SERPL-MCNC: 0.3 MG/DL
BUN SERPL-MCNC: 11.8 MG/DL (ref 8–23)
CALCIUM SERPL-MCNC: 8.9 MG/DL (ref 8.8–10.2)
CHLORIDE SERPL-SCNC: 105 MMOL/L (ref 98–107)
CHOLEST SERPL-MCNC: 181 MG/DL
CREAT SERPL-MCNC: 0.63 MG/DL (ref 0.51–0.95)
DEPRECATED HCO3 PLAS-SCNC: 23 MMOL/L (ref 22–29)
GFR SERPL CREATININE-BSD FRML MDRD: >90 ML/MIN/1.73M2
GLUCOSE SERPL-MCNC: 82 MG/DL (ref 70–99)
HDLC SERPL-MCNC: 59 MG/DL
LDLC SERPL CALC-MCNC: 107 MG/DL
NONHDLC SERPL-MCNC: 122 MG/DL
POTASSIUM SERPL-SCNC: 4.2 MMOL/L (ref 3.4–5.3)
PROT SERPL-MCNC: 6.8 G/DL (ref 6.4–8.3)
SODIUM SERPL-SCNC: 139 MMOL/L (ref 136–145)
TRIGL SERPL-MCNC: 77 MG/DL
TSH SERPL DL<=0.005 MIU/L-ACNC: 1.48 UIU/ML (ref 0.3–4.2)

## 2023-06-09 RX ORDER — FERROUS SULFATE 325(65) MG
325 TABLET, DELAYED RELEASE (ENTERIC COATED) ORAL DAILY
Qty: 90 TABLET | Refills: 0 | Status: SHIPPED | OUTPATIENT
Start: 2023-06-09 | End: 2023-09-11

## 2023-06-09 NOTE — PROGRESS NOTES
Lab results have been reviewed.  The lab results show a low hemoglobin level which is most likely due to iron deficiency anemia.  Iron supplements have been sent to the [facility],[place].  Please take that on a daily basis.  Additionally, FIT testing results are pending.  Based on the FIT testing results, additional lab work may be needed to further assess the cause of the anemia.the anemia could be causing the tiredness/fatigue symptoms. Thank you

## 2023-06-12 PROCEDURE — 82274 ASSAY TEST FOR BLOOD FECAL: CPT

## 2023-06-14 ENCOUNTER — TELEPHONE (OUTPATIENT)
Dept: INTERNAL MEDICINE | Facility: CLINIC | Age: 63
End: 2023-06-14
Payer: COMMERCIAL

## 2023-06-14 NOTE — TELEPHONE ENCOUNTER
Patient Returning Call    Reason for call:  Returning provider call    Information relayed to patient:  Informed caller message would be sent for a call back     Patient has additional questions:  Yes    What are your questions/concerns:  Patient states Dr Hansen reached out to her and stated to call her back, unsure of reason for call. Please return patient call.     Who does the patient want to speak with:  Provider    Is an  needed?:  Yes: Navya      Could we send this information to you in [facility] or would you prefer to receive a phone call?:   Patient would prefer a phone call   Okay to leave a detailed message?: Yes at Cell number on file:    Telephone Information:   Mobile [phone]

## 2023-06-14 NOTE — TELEPHONE ENCOUNTER
Please call patient with results update  Lab results have been reviewed.  The lab results show a low hemoglobin level which is most likely due to iron deficiency anemia.  Iron supplements have been sent to the [facility] pharmacy,[place].  Please take that on a daily basis.  Additionally, FIT testing results are pending.  Based on the FIT testing results, additional lab work may be needed to further assess the cause of the anemia.the anemia could be causing the tiredness/fatigue symptoms.

## 2023-06-15 NOTE — TELEPHONE ENCOUNTER
Call to patient via [employer] . Advised. Patient asking if MD has recommendations regarding any of the other lab results. Patient is specifically asking about cholesterol results.     Patient did send the FIT test back in the mail.

## 2023-06-15 NOTE — TELEPHONE ENCOUNTER
The cholesterol level  is noted to be slightly elevated.  Initial recommendations would include dietary restrictions including cutting back on high carb/fatty foods and increasing fruit/vegetable intake.  Additionally, increasing exercise to up to 30 minutes/day for 5 days of the week is recommended.   Rest of the lab work is within acceptable range.

## 2023-06-16 LAB — HEMOCCULT STL QL IA: NEGATIVE

## 2023-06-26 ENCOUNTER — TELEPHONE (OUTPATIENT)
Dept: INTERNAL MEDICINE | Facility: CLINIC | Age: 63
End: 2023-06-26
Payer: COMMERCIAL

## 2023-06-26 NOTE — TELEPHONE ENCOUNTER
Patient and her daughter calling  She doesn't want to take iron supplement she wants a iron infusion. Patient doesn't like the oral medication.   Please advise. Ok to call and silverio [phone]

## 2023-06-27 DIAGNOSIS — D50.9 IRON DEFICIENCY ANEMIA, UNSPECIFIED IRON DEFICIENCY ANEMIA TYPE: Primary | ICD-10-CM

## 2023-06-27 RX ORDER — ALBUTEROL SULFATE 90 UG/1
1-2 AEROSOL, METERED RESPIRATORY (INHALATION)
Status: CANCELLED
Start: 2023-06-27

## 2023-06-27 RX ORDER — HEPARIN SODIUM,PORCINE 10 UNIT/ML
5-20 VIAL (ML) INTRAVENOUS DAILY PRN
Status: CANCELLED | OUTPATIENT
Start: 2023-06-27

## 2023-06-27 RX ORDER — METHYLPREDNISOLONE SODIUM SUCCINATE 125 MG/2ML
125 INJECTION, POWDER, LYOPHILIZED, FOR SOLUTION INTRAMUSCULAR; INTRAVENOUS
Status: CANCELLED
Start: 2023-06-27

## 2023-06-27 RX ORDER — MEPERIDINE HYDROCHLORIDE 25 MG/ML
25 INJECTION INTRAMUSCULAR; INTRAVENOUS; SUBCUTANEOUS EVERY 30 MIN PRN
Status: CANCELLED | OUTPATIENT
Start: 2023-06-27

## 2023-06-27 RX ORDER — HEPARIN SODIUM (PORCINE) LOCK FLUSH IV SOLN 100 UNIT/ML 100 UNIT/ML
5 SOLUTION INTRAVENOUS
Status: CANCELLED | OUTPATIENT
Start: 2023-06-27

## 2023-06-27 RX ORDER — ALBUTEROL SULFATE 0.83 MG/ML
2.5 SOLUTION RESPIRATORY (INHALATION)
Status: CANCELLED | OUTPATIENT
Start: 2023-06-27

## 2023-06-27 RX ORDER — EPINEPHRINE 1 MG/ML
0.3 INJECTION, SOLUTION, CONCENTRATE INTRAVENOUS EVERY 5 MIN PRN
Status: CANCELLED | OUTPATIENT
Start: 2023-06-27

## 2023-06-27 RX ORDER — DIPHENHYDRAMINE HYDROCHLORIDE 50 MG/ML
50 INJECTION INTRAMUSCULAR; INTRAVENOUS
Status: CANCELLED
Start: 2023-06-27

## 2023-06-27 NOTE — PROGRESS NOTES
Please let the patient know that iron infusions have been ordered.  The team will be reaching out to her to get that set up.  Thank you

## 2023-06-27 NOTE — TELEPHONE ENCOUNTER
Patient advised and given phone number to contact [facility] for [facility] infusion.  JESSIE Han R.N.

## 2023-07-08 ENCOUNTER — HEALTH MAINTENANCE LETTER (OUTPATIENT)
Age: 63
End: 2023-07-08

## 2023-07-18 ENCOUNTER — INFUSION THERAPY VISIT (OUTPATIENT)
Dept: INFUSION THERAPY | Facility: CLINIC | Age: 63
End: 2023-07-18
Attending: INTERNAL MEDICINE
Payer: COMMERCIAL

## 2023-07-18 VITALS
OXYGEN SATURATION: 100 % | HEART RATE: 75 BPM | DIASTOLIC BLOOD PRESSURE: 74 MMHG | TEMPERATURE: 97.8 F | RESPIRATION RATE: 16 BRPM | SYSTOLIC BLOOD PRESSURE: 114 MMHG

## 2023-07-18 DIAGNOSIS — D50.9 IRON DEFICIENCY ANEMIA, UNSPECIFIED IRON DEFICIENCY ANEMIA TYPE: Primary | ICD-10-CM

## 2023-07-18 PROCEDURE — 258N000003 HC RX IP 258 OP 636: Performed by: INTERNAL MEDICINE

## 2023-07-18 PROCEDURE — 250N000011 HC RX IP 250 OP 636: Mod: JZ | Performed by: INTERNAL MEDICINE

## 2023-07-18 PROCEDURE — 96366 THER/PROPH/DIAG IV INF ADDON: CPT

## 2023-07-18 PROCEDURE — 96365 THER/PROPH/DIAG IV INF INIT: CPT

## 2023-07-18 RX ORDER — ALBUTEROL SULFATE 0.83 MG/ML
2.5 SOLUTION RESPIRATORY (INHALATION)
Status: CANCELLED | OUTPATIENT
Start: 2023-07-20

## 2023-07-18 RX ORDER — EPINEPHRINE 1 MG/ML
0.3 INJECTION, SOLUTION INTRAMUSCULAR; SUBCUTANEOUS EVERY 5 MIN PRN
Status: CANCELLED | OUTPATIENT
Start: 2023-07-20

## 2023-07-18 RX ORDER — MEPERIDINE HYDROCHLORIDE 25 MG/ML
25 INJECTION INTRAMUSCULAR; INTRAVENOUS; SUBCUTANEOUS EVERY 30 MIN PRN
Status: CANCELLED | OUTPATIENT
Start: 2023-07-20

## 2023-07-18 RX ORDER — ALBUTEROL SULFATE 90 UG/1
1-2 AEROSOL, METERED RESPIRATORY (INHALATION)
Status: CANCELLED
Start: 2023-07-20

## 2023-07-18 RX ORDER — HEPARIN SODIUM (PORCINE) LOCK FLUSH IV SOLN 100 UNIT/ML 100 UNIT/ML
5 SOLUTION INTRAVENOUS
Status: CANCELLED | OUTPATIENT
Start: 2023-07-20

## 2023-07-18 RX ORDER — DIPHENHYDRAMINE HYDROCHLORIDE 50 MG/ML
50 INJECTION INTRAMUSCULAR; INTRAVENOUS
Status: CANCELLED
Start: 2023-07-20

## 2023-07-18 RX ORDER — METHYLPREDNISOLONE SODIUM SUCCINATE 125 MG/2ML
125 INJECTION, POWDER, LYOPHILIZED, FOR SOLUTION INTRAMUSCULAR; INTRAVENOUS
Status: CANCELLED
Start: 2023-07-20

## 2023-07-18 RX ORDER — HEPARIN SODIUM,PORCINE 10 UNIT/ML
5-20 VIAL (ML) INTRAVENOUS DAILY PRN
Status: CANCELLED | OUTPATIENT
Start: 2023-07-20

## 2023-07-18 RX ADMIN — IRON SUCROSE 300 MG: 20 INJECTION, SOLUTION INTRAVENOUS at 12:34

## 2023-07-18 NOTE — PROGRESS NOTES
Infusion Nursing Note:  Ar Elisabeth Patricia presents today for Venofer.    Patient seen by provider today: No   present during visit today: No - offered, but declined.    Note: Medication information sheet given on Venofer. Discussed use, administration, and side effects prior to beginning.   C/O itching in back of upper arm (tricep area) minutes after starting infusion. No rash or redness noted. Warm pack applied for comfort; itching subsided and did not return.    Intravenous Access:  Peripheral IV placed.    Treatment Conditions:  Not Applicable.    Post Infusion Assessment:  Patient tolerated infusion without incident.  Blood return noted pre and post infusion.  Site patent and intact, free from redness, edema or discomfort.  No evidence of extravasations.  Access discontinued per protocol.     Discharge Plan:   Copy of AVS reviewed with patient and/or family.  Patient will return 7/20 for next appointment.  Patient discharged in stable condition accompanied by: daughter.  Departure Mode: Ambulatory.    Shruthi Deleon RN

## 2023-07-20 ENCOUNTER — INFUSION THERAPY VISIT (OUTPATIENT)
Dept: INFUSION THERAPY | Facility: CLINIC | Age: 63
End: 2023-07-20
Attending: INTERNAL MEDICINE
Payer: COMMERCIAL

## 2023-07-20 VITALS
TEMPERATURE: 97.4 F | DIASTOLIC BLOOD PRESSURE: 71 MMHG | RESPIRATION RATE: 16 BRPM | HEART RATE: 63 BPM | SYSTOLIC BLOOD PRESSURE: 112 MMHG | OXYGEN SATURATION: 99 %

## 2023-07-20 DIAGNOSIS — D50.9 IRON DEFICIENCY ANEMIA, UNSPECIFIED IRON DEFICIENCY ANEMIA TYPE: Primary | ICD-10-CM

## 2023-07-20 PROCEDURE — 96365 THER/PROPH/DIAG IV INF INIT: CPT

## 2023-07-20 PROCEDURE — 250N000011 HC RX IP 250 OP 636: Mod: JZ | Performed by: INTERNAL MEDICINE

## 2023-07-20 PROCEDURE — 96366 THER/PROPH/DIAG IV INF ADDON: CPT

## 2023-07-20 PROCEDURE — 258N000003 HC RX IP 258 OP 636: Performed by: INTERNAL MEDICINE

## 2023-07-20 RX ORDER — HEPARIN SODIUM (PORCINE) LOCK FLUSH IV SOLN 100 UNIT/ML 100 UNIT/ML
5 SOLUTION INTRAVENOUS
Status: CANCELLED | OUTPATIENT
Start: 2023-07-22

## 2023-07-20 RX ORDER — METHYLPREDNISOLONE SODIUM SUCCINATE 125 MG/2ML
125 INJECTION, POWDER, LYOPHILIZED, FOR SOLUTION INTRAMUSCULAR; INTRAVENOUS
Status: CANCELLED
Start: 2023-07-22

## 2023-07-20 RX ORDER — DIPHENHYDRAMINE HYDROCHLORIDE 50 MG/ML
50 INJECTION INTRAMUSCULAR; INTRAVENOUS
Status: CANCELLED
Start: 2023-07-22

## 2023-07-20 RX ORDER — MEPERIDINE HYDROCHLORIDE 25 MG/ML
25 INJECTION INTRAMUSCULAR; INTRAVENOUS; SUBCUTANEOUS EVERY 30 MIN PRN
Status: CANCELLED | OUTPATIENT
Start: 2023-07-22

## 2023-07-20 RX ORDER — EPINEPHRINE 1 MG/ML
0.3 INJECTION, SOLUTION INTRAMUSCULAR; SUBCUTANEOUS EVERY 5 MIN PRN
Status: CANCELLED | OUTPATIENT
Start: 2023-07-22

## 2023-07-20 RX ORDER — HEPARIN SODIUM,PORCINE 10 UNIT/ML
5-20 VIAL (ML) INTRAVENOUS DAILY PRN
Status: CANCELLED | OUTPATIENT
Start: 2023-07-22

## 2023-07-20 RX ORDER — ALBUTEROL SULFATE 0.83 MG/ML
2.5 SOLUTION RESPIRATORY (INHALATION)
Status: CANCELLED | OUTPATIENT
Start: 2023-07-22

## 2023-07-20 RX ORDER — ALBUTEROL SULFATE 90 UG/1
1-2 AEROSOL, METERED RESPIRATORY (INHALATION)
Status: CANCELLED
Start: 2023-07-22

## 2023-07-20 RX ADMIN — IRON SUCROSE 300 MG: 20 INJECTION, SOLUTION INTRAVENOUS at 13:03

## 2023-07-20 NOTE — PROGRESS NOTES
Infusion Nursing Note:  Ar Elisabeth Patricia presents today for venofer.    Patient seen by provider today: No   present during visit today: Not Applicable.    Note: says she does not feel any different than before her 1st infusion.      Intravenous Access:  Peripheral IV placed.    Treatment Conditions:  Lab Results   Component Value Date    HGB 8.9 (L) 06/08/2023    WBC 5.5 06/08/2023    ANEU 4.1 07/02/2021    ANEUTAUTO 5.8 07/28/2022     06/08/2023          Post Infusion Assessment:  Patient tolerated infusion without incident.  Blood return noted pre and post infusion.  Site patent and intact, free from redness, edema or discomfort.  No evidence of extravasations.  Access discontinued per protocol.       Discharge Plan:   Discharge instructions reviewed with: Patient.  Patient and/or family verbalized understanding of discharge instructions and all questions answered.  AVS to patient via farmfloT.  Patient will return 7/27 for next appointment.   Patient discharged in stable condition accompanied by: self.  Departure Mode: Ambulatory.      Coco Holloway RN

## 2023-07-25 ENCOUNTER — OFFICE VISIT (OUTPATIENT)
Dept: INTERNAL MEDICINE | Facility: CLINIC | Age: 63
End: 2023-07-25
Payer: COMMERCIAL

## 2023-07-25 VITALS
BODY MASS INDEX: 23.29 KG/M2 | DIASTOLIC BLOOD PRESSURE: 62 MMHG | OXYGEN SATURATION: 98 % | RESPIRATION RATE: 16 BRPM | HEIGHT: 66 IN | HEART RATE: 91 BPM | WEIGHT: 144.9 LBS | TEMPERATURE: 97.6 F | SYSTOLIC BLOOD PRESSURE: 110 MMHG

## 2023-07-25 DIAGNOSIS — D50.9 IRON DEFICIENCY ANEMIA, UNSPECIFIED IRON DEFICIENCY ANEMIA TYPE: Primary | ICD-10-CM

## 2023-07-25 DIAGNOSIS — M62.81 GENERALIZED MUSCLE WEAKNESS: ICD-10-CM

## 2023-07-25 LAB
ERYTHROCYTE [DISTWIDTH] IN BLOOD BY AUTOMATED COUNT: 19.7 % (ref 10–15)
HCT VFR BLD AUTO: 36.2 % (ref 35–47)
HGB BLD-MCNC: 11.5 G/DL (ref 11.7–15.7)
MCH RBC QN AUTO: 28.6 PG (ref 26.5–33)
MCHC RBC AUTO-ENTMCNC: 31.8 G/DL (ref 31.5–36.5)
MCV RBC AUTO: 90 FL (ref 78–100)
PLATELET # BLD AUTO: 188 10E3/UL (ref 150–450)
RBC # BLD AUTO: 4.02 10E6/UL (ref 3.8–5.2)
WBC # BLD AUTO: 6 10E3/UL (ref 4–11)

## 2023-07-25 PROCEDURE — 36415 COLL VENOUS BLD VENIPUNCTURE: CPT | Performed by: INTERNAL MEDICINE

## 2023-07-25 PROCEDURE — 99214 OFFICE O/P EST MOD 30 MIN: CPT | Performed by: INTERNAL MEDICINE

## 2023-07-25 PROCEDURE — 85027 COMPLETE CBC AUTOMATED: CPT | Performed by: INTERNAL MEDICINE

## 2023-07-25 ASSESSMENT — PAIN SCALES - GENERAL: PAINLEVEL: NO PAIN (0)

## 2023-07-25 NOTE — PROGRESS NOTES
Assessment & Plan     Iron deficiency anemia, unspecified iron deficiency anemia type  Currently receiving iron infusions.  Completed 2 of 3 sessions.  Discussed with the patient on getting updated lab work with a complete blood count.  FIT testing was completed which is reassuring.  We also discussed on completing other age-appropriate cancer screening including mammogram and Pap smear and patient currently would like to hold off on that and think more about that.  - CBC with platelets; Future    Generalized muscle weakness  Discussed with the patient on the benefits of gentle stretching exercises as well as strengthening exercises with the physical therapy team for the generalized muscle weakness and fatigue. Update CBC to further assess for improvement in anemia which may be a contributing factor to the fatigue.  The rest of the lab work including a vitamin D check,TSH as well as CMP was discussed with the patient today which was within acceptable range.    - Physical Therapy Referral; Future                 Lillian Hansen MD  [facility] CenterSRAVANTHI Alvarenga is a 63 year old, presenting for the following health issues:  Results (Lab result from the last visit )        7/25/2023     1:38 PM   Additional Questions   Roomed by [employer]  Phone interpretor [phone]     History of Present Illness       Reason for visit:  Follow up    She eats 0-1 servings of fruits and vegetables daily.She consumes 1 sweetened beverage(s) daily.She exercises with enough effort to increase her heart rate 9 or less minutes per day.  She exercises with enough effort to increase her heart rate 3 or less days per week.   She is taking medications regularly.     Has been receiving iron infusion for iron deficiency anemia. Last fecal testing was negative.no hematuria or vaginal bleeding.  Has been feeling fatigue despite iron supplementation IV.          Review of Systems   Constitutional, HEENT, cardiovascular, pulmonary,  "gi and gu systems are negative, except as otherwise noted.      Objective    /62   Pulse 91   Temp 97.6  F (36.4  C) (Tympanic)   Resp 16   Ht 1.676 m (5' 6\")   Wt 65.7 kg (144 lb 14.4 oz)   LMP 07/28/2000   SpO2 98%   BMI 23.39 kg/m    Body mass index is 23.39 kg/m .  Physical Exam   GENERAL: healthy, alert and no distress  RESP: lungs clear to auscultation - no rales, rhonchi or wheezes  CV: regular rate and rhythm, normal S1 S2  MS: no gross musculoskeletal defects noted, no edema  SKIN: no suspicious lesions or rashes  NEURO: Normal strength and tone, mentation intact and speech normal  PSYCH: mentation appears normal, affect normal/bright                      "

## 2023-07-27 ENCOUNTER — INFUSION THERAPY VISIT (OUTPATIENT)
Dept: INFUSION THERAPY | Facility: CLINIC | Age: 63
End: 2023-07-27
Attending: INTERNAL MEDICINE
Payer: COMMERCIAL

## 2023-07-27 VITALS
RESPIRATION RATE: 16 BRPM | BODY MASS INDEX: 23.5 KG/M2 | SYSTOLIC BLOOD PRESSURE: 123 MMHG | TEMPERATURE: 98.9 F | WEIGHT: 145.6 LBS | DIASTOLIC BLOOD PRESSURE: 78 MMHG | HEART RATE: 67 BPM | OXYGEN SATURATION: 99 %

## 2023-07-27 DIAGNOSIS — D50.9 IRON DEFICIENCY ANEMIA, UNSPECIFIED IRON DEFICIENCY ANEMIA TYPE: Primary | ICD-10-CM

## 2023-07-27 PROCEDURE — 96365 THER/PROPH/DIAG IV INF INIT: CPT

## 2023-07-27 PROCEDURE — 258N000003 HC RX IP 258 OP 636: Performed by: INTERNAL MEDICINE

## 2023-07-27 PROCEDURE — 96366 THER/PROPH/DIAG IV INF ADDON: CPT

## 2023-07-27 PROCEDURE — 250N000011 HC RX IP 250 OP 636: Mod: JZ | Performed by: INTERNAL MEDICINE

## 2023-07-27 RX ORDER — DIPHENHYDRAMINE HYDROCHLORIDE 50 MG/ML
50 INJECTION INTRAMUSCULAR; INTRAVENOUS
Status: CANCELLED
Start: 2023-07-28

## 2023-07-27 RX ORDER — EPINEPHRINE 1 MG/ML
0.3 INJECTION, SOLUTION INTRAMUSCULAR; SUBCUTANEOUS EVERY 5 MIN PRN
Status: CANCELLED | OUTPATIENT
Start: 2023-07-28

## 2023-07-27 RX ORDER — ALBUTEROL SULFATE 0.83 MG/ML
2.5 SOLUTION RESPIRATORY (INHALATION)
Status: CANCELLED | OUTPATIENT
Start: 2023-07-28

## 2023-07-27 RX ORDER — HEPARIN SODIUM (PORCINE) LOCK FLUSH IV SOLN 100 UNIT/ML 100 UNIT/ML
5 SOLUTION INTRAVENOUS
Status: CANCELLED | OUTPATIENT
Start: 2023-07-28

## 2023-07-27 RX ORDER — HEPARIN SODIUM,PORCINE 10 UNIT/ML
5-20 VIAL (ML) INTRAVENOUS DAILY PRN
Status: CANCELLED | OUTPATIENT
Start: 2023-07-28

## 2023-07-27 RX ORDER — MEPERIDINE HYDROCHLORIDE 25 MG/ML
25 INJECTION INTRAMUSCULAR; INTRAVENOUS; SUBCUTANEOUS EVERY 30 MIN PRN
Status: CANCELLED | OUTPATIENT
Start: 2023-07-28

## 2023-07-27 RX ORDER — METHYLPREDNISOLONE SODIUM SUCCINATE 125 MG/2ML
125 INJECTION, POWDER, LYOPHILIZED, FOR SOLUTION INTRAMUSCULAR; INTRAVENOUS
Status: CANCELLED
Start: 2023-07-28

## 2023-07-27 RX ORDER — ALBUTEROL SULFATE 90 UG/1
1-2 AEROSOL, METERED RESPIRATORY (INHALATION)
Status: CANCELLED
Start: 2023-07-28

## 2023-07-27 RX ADMIN — SODIUM CHLORIDE 250 ML: 9 INJECTION, SOLUTION INTRAVENOUS at 08:59

## 2023-07-27 RX ADMIN — IRON SUCROSE 300 MG: 20 INJECTION, SOLUTION INTRAVENOUS at 08:59

## 2023-07-27 NOTE — PROGRESS NOTES
Infusion Nursing Note:  Colette Patricia presents today for Venofer # 3.    Patient seen by provider today: No   present during visit today: Not Applicable.    Note: N/A.      Intravenous Access:  Peripheral IV placed.    Treatment Conditions:  Not Applicable.      Post Infusion Assessment:  Patient tolerated infusion without incident.  Blood return noted pre and post infusion.  Site patent and intact, free from redness, edema or discomfort.  No evidence of extravasations.  Access discontinued per protocol.       Discharge Plan:   Discharge instructions reviewed with: Patient.  Patient and/or family verbalized understanding of discharge instructions and all questions answered.  AVS to patient via [employer].  Patient discharged in stable condition accompanied by: self.  Departure Mode: Ambulatory.      Kari Schoen, RN

## 2023-08-03 ENCOUNTER — THERAPY VISIT (OUTPATIENT)
Dept: PHYSICAL THERAPY | Facility: CLINIC | Age: 63
End: 2023-08-03
Attending: INTERNAL MEDICINE
Payer: COMMERCIAL

## 2023-08-03 DIAGNOSIS — M62.81 GENERALIZED MUSCLE WEAKNESS: ICD-10-CM

## 2023-08-03 PROCEDURE — 97110 THERAPEUTIC EXERCISES: CPT | Mod: GP | Performed by: PHYSICAL THERAPIST

## 2023-08-03 PROCEDURE — 97162 PT EVAL MOD COMPLEX 30 MIN: CPT | Mod: GP | Performed by: PHYSICAL THERAPIST
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
## 2023-08-08 ENCOUNTER — APPOINTMENT (OUTPATIENT)
Dept: INTERPRETER SERVICES | Facility: CLINIC | Age: 63
End: 2023-08-08
Payer: COMMERCIAL

## 2023-08-17 ENCOUNTER — PATIENT OUTREACH (OUTPATIENT)
Dept: GERIATRIC MEDICINE | Facility: CLINIC | Age: 63
End: 2023-08-17
Payer: COMMERCIAL

## 2023-08-17 ENCOUNTER — TRANSCRIBE ORDERS (OUTPATIENT)
Dept: OTHER | Age: 63
End: 2023-08-17

## 2023-08-17 DIAGNOSIS — M51.16 INTERVERTEBRAL DISC DISORDERS WITH RADICULOPATHY, LUMBAR REGION: ICD-10-CM

## 2023-08-17 DIAGNOSIS — M48.062 SPINAL STENOSIS, LUMBAR REGION, WITH NEUROGENIC CLAUDICATION: ICD-10-CM

## 2023-08-17 DIAGNOSIS — M79.2 NEURALGIA AND NEURITIS, UNSPECIFIED: ICD-10-CM

## 2023-08-17 DIAGNOSIS — M43.16 SPONDYLOLISTHESIS OF LUMBAR REGION: ICD-10-CM

## 2023-08-17 DIAGNOSIS — M54.50 LOW BACK PAIN, UNSPECIFIED: Primary | ICD-10-CM

## 2023-08-17 NOTE — PROGRESS NOTES
[facility] Care Coordination Contact    Received call from member requesting assistance in determining if a back brace is covered under insurance. Reported she is seen at [facility] and the model is . Member also requested to attend adult day program. CC explained HRA is needed to open to EW in order to pay for the day program because straight [employer]/insurance will not cover the day program. CC explained HRA and assessment information. Member reported independent in all care needs and denied wanting to meet to complete HRA. CC explained that insurance would not cover the day program. Member stated understanding.     CC left a message with [facility] to get more information regarding the back brace to determine if covered by insurance. Requested call back with update.   Brandy Montiel RN  [facility]  P: [phone]  Fax: [phone]

## 2023-08-17 NOTE — PROGRESS NOTES
[facility] Care Coordination Contact    Received call back from [facility] who reported that brace is a Breg LSO universal Back brace reported to cost about $4500- reported most insurance do cover the brace. CPT code is  and member has a fitting scheduled for 9/20 for the brace.    CC spoke with are and determined brace would be covered and [facility] is in network to provide the brace as well.     CC spoke with member and update regarding coverage. Also, confirmed PCP is Dr. Hansen at the [facility].  Brandy Montiel RN  [facility]  P: [phone]  Fax: [phone]

## 2023-09-09 DIAGNOSIS — D50.8 OTHER IRON DEFICIENCY ANEMIA: ICD-10-CM

## 2023-09-11 RX ORDER — FERROUS SULFATE 325(65) MG
325 TABLET, DELAYED RELEASE (ENTERIC COATED) ORAL DAILY
Qty: 90 TABLET | Refills: 0 | Status: SHIPPED | OUTPATIENT
Start: 2023-09-11 | End: 2023-12-18

## 2023-09-14 ENCOUNTER — THERAPY VISIT (OUTPATIENT)
Dept: PHYSICAL THERAPY | Facility: CLINIC | Age: 63
End: 2023-09-14
Attending: INTERNAL MEDICINE
Payer: COMMERCIAL

## 2023-09-14 DIAGNOSIS — M62.81 GENERALIZED MUSCLE WEAKNESS: Primary | ICD-10-CM

## 2023-09-14 PROCEDURE — 97140 MANUAL THERAPY 1/> REGIONS: CPT | Mod: GP | Performed by: PHYSICAL THERAPIST

## 2023-09-14 PROCEDURE — 97110 THERAPEUTIC EXERCISES: CPT | Mod: GP | Performed by: PHYSICAL THERAPIST

## 2023-09-21 ENCOUNTER — THERAPY VISIT (OUTPATIENT)
Dept: PHYSICAL THERAPY | Facility: CLINIC | Age: 63
End: 2023-09-21
Attending: INTERNAL MEDICINE
Payer: COMMERCIAL

## 2023-09-21 DIAGNOSIS — M62.81 GENERALIZED MUSCLE WEAKNESS: Primary | ICD-10-CM

## 2023-09-21 PROCEDURE — 97140 MANUAL THERAPY 1/> REGIONS: CPT | Mod: GP | Performed by: PHYSICAL THERAPIST

## 2023-09-21 PROCEDURE — 97110 THERAPEUTIC EXERCISES: CPT | Mod: GP | Performed by: PHYSICAL THERAPIST

## 2023-09-28 ENCOUNTER — THERAPY VISIT (OUTPATIENT)
Dept: PHYSICAL THERAPY | Facility: CLINIC | Age: 63
End: 2023-09-28
Attending: INTERNAL MEDICINE
Payer: COMMERCIAL

## 2023-09-28 DIAGNOSIS — M62.81 GENERALIZED MUSCLE WEAKNESS: Primary | ICD-10-CM

## 2023-09-28 PROCEDURE — 97140 MANUAL THERAPY 1/> REGIONS: CPT | Mod: GP | Performed by: PHYSICAL THERAPIST

## 2023-09-28 PROCEDURE — 97110 THERAPEUTIC EXERCISES: CPT | Mod: GP | Performed by: PHYSICAL THERAPIST

## 2023-10-05 ENCOUNTER — THERAPY VISIT (OUTPATIENT)
Dept: PHYSICAL THERAPY | Facility: CLINIC | Age: 63
End: 2023-10-05
Attending: INTERNAL MEDICINE
Payer: COMMERCIAL

## 2023-10-05 DIAGNOSIS — M62.81 GENERALIZED MUSCLE WEAKNESS: Primary | ICD-10-CM

## 2023-10-05 PROCEDURE — 97110 THERAPEUTIC EXERCISES: CPT | Mod: GP | Performed by: PHYSICAL THERAPIST

## 2023-10-10 ENCOUNTER — PATIENT OUTREACH (OUTPATIENT)
Dept: GERIATRIC MEDICINE | Facility: CLINIC | Age: 63
End: 2023-10-10
Payer: COMMERCIAL

## 2023-10-10 NOTE — PROGRESS NOTES
[facility] Care Coordination Contact      [facility] Six-Month Telephone Assessment    6 month telephone assessment completed on 10/10/23.    ER visits: yes 3/25/23  Hospitalizations: No  TCU stays: No  Significant health status changes: no changes or concerns at this time. Had root cannal completed a few months ago and was referred to a specialist dental provider due to needing a possible another root cannal. CC provided [facility] information and recommended she reach out to them due to they will assist with finding provider and scheduling follow up.   Falls/Injuries: No  ADL/IADL changes: No  Changes in services: No- reports independence     Caregiver Assessment follow up:  NA    Goals: See POC in chart for goal progress documentation.      Will see member in 6 months for an annual health risk assessment.   Encouraged member to call CC with any questions or concerns in the meantime.   Brandy Montiel RN  [facility]  P: [phone]  Fax: [phone]

## 2023-10-12 ENCOUNTER — THERAPY VISIT (OUTPATIENT)
Dept: PHYSICAL THERAPY | Facility: CLINIC | Age: 63
End: 2023-10-12
Attending: INTERNAL MEDICINE
Payer: COMMERCIAL

## 2023-10-12 DIAGNOSIS — M62.81 GENERALIZED MUSCLE WEAKNESS: Primary | ICD-10-CM

## 2023-10-12 PROCEDURE — 97110 THERAPEUTIC EXERCISES: CPT | Mod: GP | Performed by: PHYSICAL THERAPIST

## 2023-10-12 PROCEDURE — 97140 MANUAL THERAPY 1/> REGIONS: CPT | Mod: GP | Performed by: PHYSICAL THERAPIST

## 2023-11-27 NOTE — PROGRESS NOTES
DISCHARGE  Reason for Discharge: Patient chooses to discontinue therapy.    Equipment Issued: HEP    Discharge Plan: Patient to continue home program.    Referring Provider:  Lillian Hansen        10/12/23 0500   Appointment Info   Signing clinician's name / credentials Heidi Schmidt, PT   Total/Authorized Visits [employer] [facility]   Visits Used 6   Medical Diagnosis generalized muscle weakness   PT Tx Diagnosis impaired functional activity tolerance, postural impairments and tissue shortening/restrictions   Precautions/Limitations history of Spondylolisthesis L5-S1 Grade II   Quick Adds Certification   Progress Note/Certification   Start of Care Date 08/03/23   Onset of illness/injury or Date of Surgery 07/25/23  (date of order, however has had chronic back pain history)   Therapy Frequency 1 x a week   Predicted Duration 8 visits   Certification date from 08/03/23   Certification date to 10/26/23       Present   (has declined use of .)   GOALS   PT Goals 2;3;4;5   PT Goal 1   Goal Identifier HEP   Goal Description Ardo to be independent in an appropriate home exercise program to address her impairments and improve her function.   Rationale to maximize safety and independence with performance of ADLs and functional tasks;to maximize safety and independence within the home;to maximize safety and independence within the community;to maximize safety and independence with transportation;to maximize safety and independence with self cares   Goal Progress currently not doing a HEP -EVAL   Target Date 10/26/23   PT Goal 2   Goal Identifier 6MWT   Goal Description Ardo to ambulate 1200 feet or greater in 6 min to demonstrate improved tolerance to ambulation .   Rationale to maximize safety and independence within the community   Goal Progress Eval - 1040 feet no device, see eval   Target Date 10/24/23   PT Goal 3   Goal Identifier Pain   Goal Description Ardo to report improved tolerance to long  distance ambulation and performance of stairs and decrease in her pain by 30% or greater to allow for improved function and quality of life.   Rationale to maximize safety and independence with self cares;to maximize safety and independence with transportation;to maximize safety and independence within the community;to maximize safety and independence within the home;to maximize safety and independence with performance of ADLs and functional tasks   Target Date 10/24/23   PT Goal 4   Goal Identifier ankle ROM   Goal Description Ardo to improve her ankle ROM L to 8 degrees or better A/AAROM in sitting to allow for improved alignment, decreased cramping and pain LE and back for overall improved fucntion and tolerance to activity   Rationale to maximize safety and independence with performance of ADLs and functional tasks;to maximize safety and independence within the home;to maximize safety and independence within the community;to maximize safety and independence with transportation;to maximize safety and independence with self cares   Goal Progress Eval  R 8 degrees L neutral to 3 degrees - some difficulty with understanding directions vs guarding   Target Date 10/24/23   PT Goal 5   Goal Identifier 5XSTS   Goal Description Ardo to perform 5XSTS in 10.5 sec or less to demonstrate improved functional LE strength.   Rationale to maximize safety and independence with self cares;to maximize safety and independence with transportation;to maximize safety and independence within the community;to maximize safety and independence within the home;to maximize safety and independence with performance of ADLs and functional tasks   Goal Progress eval 15.53 sec and reports discomfort in calves   Target Date 10/24/23   Subjective Report   Subjective Report has an appointment on the 20th with MD regarding her back pan. she has been wearing the back brace some -But it is so heavy - used for two days.     Unable to fully assess goals  due to unexpected discharge from PT .  Please see daily notes for specifics regarding function.

## 2023-12-07 ENCOUNTER — TELEPHONE (OUTPATIENT)
Dept: DERMATOLOGY | Facility: CLINIC | Age: 63
End: 2023-12-07
Payer: COMMERCIAL

## 2023-12-07 NOTE — TELEPHONE ENCOUNTER
[facility] Lenn[facility]12 will need to be r/s. Dr. Lennon will not be in clinic.

## 2023-12-08 ENCOUNTER — TELEPHONE (OUTPATIENT)
Dept: DERMATOLOGY | Facility: CLINIC | Age: 63
End: 2023-12-08
Payer: COMMERCIAL

## 2023-12-08 NOTE — TELEPHONE ENCOUNTER
[facility] sent [facility] msg for patient to reschedule the following:    Appointment type: Return  Provider: Dr. Lennon  Return date: 01-12-24  Specialty phone number: [phone]

## 2023-12-17 DIAGNOSIS — D50.8 OTHER IRON DEFICIENCY ANEMIA: ICD-10-CM

## 2023-12-18 RX ORDER — FERROUS SULFATE 325(65) MG
325 TABLET, DELAYED RELEASE (ENTERIC COATED) ORAL DAILY
Qty: 90 TABLET | Refills: 1 | Status: SHIPPED | OUTPATIENT
Start: 2023-12-18

## 2023-12-27 ENCOUNTER — PATIENT OUTREACH (OUTPATIENT)
Dept: GERIATRIC MEDICINE | Facility: CLINIC | Age: 63
End: 2023-12-27
Payer: COMMERCIAL

## 2023-12-27 NOTE — PROGRESS NOTES
[facility] Care Coordination Contact  Received update from [facility] that member needs to complete MA renewal paperwork by 12/31/23 or MA will be on hold.   Placed call to member to follow up and she reported she sent in some paperwork to the [facility] a few weeks ago but also received a notice recently from [facility] that her coverage has stopped, also they were asking financial questions that she did not understand. Requested assistance with completing application. CC updated that the [facility] will need proof documents like bank account statements or anything from social security if receiving funds. CC updated that CC can not assist but [facility] has a network that can help with completing applications as well. Member confirmed address in [facility] is correct. Also member confirmed her daughter has all the documents and will assist with completing the applications    CC reached out to [facility] to see if they received any documents as well as to confirm member can reapply online.  Brandy Montiel RN  [facility]  P: [phone]  Fax: [phone]

## 2024-01-08 ENCOUNTER — PATIENT OUTREACH (OUTPATIENT)
Dept: GERIATRIC MEDICINE | Facility: CLINIC | Age: 64
End: 2024-01-08
Payer: COMMERCIAL

## 2024-01-08 NOTE — PROGRESS NOTES
[facility] Care Coordination Contact    CHW, via  spoke w/ Mbr to remind to schedule mammogram and wellness exams.  Mbr noted she would follow up to schedule.      Kasia Norton DILIP  [facility]  [phone]

## 2024-02-07 ENCOUNTER — OFFICE VISIT (OUTPATIENT)
Dept: INTERNAL MEDICINE | Facility: CLINIC | Age: 64
End: 2024-02-07
Payer: COMMERCIAL

## 2024-02-07 VITALS
WEIGHT: 156.4 LBS | RESPIRATION RATE: 16 BRPM | TEMPERATURE: 98 F | SYSTOLIC BLOOD PRESSURE: 130 MMHG | HEIGHT: 66 IN | BODY MASS INDEX: 25.13 KG/M2 | DIASTOLIC BLOOD PRESSURE: 77 MMHG | OXYGEN SATURATION: 98 % | HEART RATE: 76 BPM

## 2024-02-07 DIAGNOSIS — E55.9 VITAMIN D DEFICIENCY: ICD-10-CM

## 2024-02-07 DIAGNOSIS — Z00.00 ENCOUNTER FOR PREVENTATIVE ADULT HEALTH CARE EXAMINATION: Primary | ICD-10-CM

## 2024-02-07 DIAGNOSIS — D64.9 ANEMIA, UNSPECIFIED TYPE: ICD-10-CM

## 2024-02-07 DIAGNOSIS — Z78.0 MENOPAUSE: ICD-10-CM

## 2024-02-07 DIAGNOSIS — Z12.11 COLON CANCER SCREENING: ICD-10-CM

## 2024-02-07 DIAGNOSIS — M51.369 DDD (DEGENERATIVE DISC DISEASE), LUMBAR: ICD-10-CM

## 2024-02-07 DIAGNOSIS — M79.10 MYALGIA: ICD-10-CM

## 2024-02-07 DIAGNOSIS — R53.83 OTHER FATIGUE: ICD-10-CM

## 2024-02-07 DIAGNOSIS — M85.88 OSTEOPENIA OF SPINE: ICD-10-CM

## 2024-02-07 DIAGNOSIS — R11.0 NAUSEA: ICD-10-CM

## 2024-02-07 PROCEDURE — G0439 PPPS, SUBSEQ VISIT: HCPCS | Performed by: INTERNAL MEDICINE

## 2024-02-07 PROCEDURE — 99213 OFFICE O/P EST LOW 20 MIN: CPT | Mod: 25 | Performed by: INTERNAL MEDICINE

## 2024-02-07 RX ORDER — RESPIRATORY SYNCYTIAL VIRUS VACCINE 120MCG/0.5
0.5 KIT INTRAMUSCULAR ONCE
Qty: 1 EACH | Refills: 0 | Status: CANCELLED | OUTPATIENT
Start: 2024-02-07 | End: 2024-02-07

## 2024-02-07 RX ORDER — ERGOCALCIFEROL 1.25 MG/1
50000 CAPSULE, LIQUID FILLED ORAL WEEKLY
Status: ON HOLD | COMMUNITY
Start: 2024-01-17 | End: 2024-05-23

## 2024-02-07 ASSESSMENT — PAIN SCALES - GENERAL: PAINLEVEL: NO PAIN (0)

## 2024-02-07 ASSESSMENT — ENCOUNTER SYMPTOMS: NAUSEA: 1

## 2024-02-07 ASSESSMENT — ACTIVITIES OF DAILY LIVING (ADL): CURRENT_FUNCTION: NO ASSISTANCE NEEDED

## 2024-02-07 NOTE — PROGRESS NOTES
"  Assessment & Plan     Osteopenia of spine  Assess DEXA     Other fatigue  Assess lab work for anemia, hypothyroidism   - CBC with platelets  - Comprehensive metabolic panel (BMP + Alb, Alk Phos, ALT, AST, Total. Bili, TP)  - TSH with free T4 reflex    Myalgia  Assess inflammatory marker   - ESR: Erythrocyte sedimentation rate    DDD (degenerative disc disease), lumbar  Follow up with ortho spine     Encounter for preventative adult health care examination  advised regular aerobic activity, low cholesterol, low salt diet, wearing seat belt,  self examinations, sunscreen protection.Obtain screening cholesterol, immunizations reviewed.    - UA with Microscopic reflex to Culture - lab collect    Menopause    - DX Hip/Pelvis/Spine; Future    Colon cancer screening  - Colonoscopy Screening  Referral; Future    Anemia, unspecified type    - Iron and iron binding capacity  - Ferritin  - Vitamin B12  - Folate    Vitamin D deficiency  - Vitamin D Deficiency    Nausea  Assess for H pylori infection   - Helicobacter pylori Antigen Stool            BMI  Estimated body mass index is 25.24 kg/m  as calculated from the following:    Height as of this encounter: 1.676 m (5' 6\").    Weight as of this encounter: 70.9 kg (156 lb 6.4 oz).         See Patient Instructions    Juani Alvarenga is a 63 year old, presenting for the following health issues:  Nausea (Pt c/o nausea when eating sometimes but not all of the time) and Wellness Visit        2/7/2024     3:18 PM   Additional Questions   Roomed by Makenna ORTIZ   Accompanied by n/a     Nausea  Associated symptoms include nausea.   History of Present Illness       Reason for visit:  Phisical    She eats 0-1 servings of fruits and vegetables daily.She consumes 1 sweetened beverage(s) daily.She exercises with enough effort to increase her heart rate 9 or less minutes per day.  She exercises with enough effort to increase her heart rate 5 days per week.   She is not taking " "prescribed medications regularly due to None.  Healthy Habits:     In general, how would you rate your overall health?  Very good    Frequency of exercise:  4-5 days/week    Duration of exercise:  Less than 15 minutes    Taking medications regularly:  Yes    Barriers to taking medications:  None    Medication side effects:  None    Ability to successfully perform activities of daily living:  No assistance needed    Home Safety:  No safety concerns identified    Hearing Impairment:  No hearing concerns    In the past 6 months, have you been bothered by leaking of urine? Yes    In general, how would you rate your overall mental or emotional health?  Very good    Additional concerns today:  No       Insomnia for a year, wakes up frequently. Has back pains. Has h/o LS spine DDD. Seen ortho, advised for surgery.   Has bones pains. In the lower back , hips and legs. Pain is all the time.  Worse with laying on one side.   Has had nausea. Related to eating. Has constipation. BMs are 1-2 week.   Taken Miralax. Helped for a week.   Reports generalized fatigue, poor energy. Has h/o mild anemia.           Review of Systems  Constitutional, HEENT, cardiovascular, pulmonary, GI, , musculoskeletal, neuro, skin, endocrine and psych systems are negative, except as otherwise noted.      Objective    /77 (BP Location: Left arm, Cuff Size: Adult Regular)   Pulse 76   Temp 98  F (36.7  C) (Tympanic)   Resp 16   Ht 1.676 m (5' 6\")   Wt 70.9 kg (156 lb 6.4 oz)   LMP 07/28/2000   SpO2 98%   BMI 25.24 kg/m    Body mass index is 25.24 kg/m .  Physical Exam   GENERAL: alert and no distress  EYES: Eyes grossly normal to inspection, PERRL and conjunctivae and sclerae normal  HENT: ear canals and TM's normal, nose and mouth without ulcers or lesions  NECK: no adenopathy, no asymmetry, masses, or scars  RESP: lungs clear to auscultation - no rales, rhonchi or wheezes  CV: regular rate and rhythm, normal S1 S2, no S3 or S4, no " murmur, click or rub, no peripheral edema  ABDOMEN: soft, nontender, no hepatosplenomegaly, no masses and bowel sounds normal  MS: no gross musculoskeletal defects noted, no edema. LS spine paravertebral palpatory tenderness   SKIN: no suspicious lesions or rashes  NEURO: Normal strength and tone, mentation intact and speech normal  PSYCH: mentation appears normal, affect normal/bright    Office Visit on 07/25/2023   Component Date Value Ref Range Status    WBC Count 07/25/2023 6.0  4.0 - 11.0 10e3/uL Final    RBC Count 07/25/2023 4.02  3.80 - 5.20 10e6/uL Final    Hemoglobin 07/25/2023 11.5 (L)  11.7 - 15.7 g/dL Final    Hematocrit 07/25/2023 36.2  35.0 - 47.0 % Final    MCV 07/25/2023 90  78 - 100 fL Final    MCH 07/25/2023 28.6  26.5 - 33.0 pg Final    MCHC 07/25/2023 31.8  31.5 - 36.5 g/dL Final    RDW 07/25/2023 19.7 (H)  10.0 - 15.0 % Final    Platelet Count 07/25/2023 188  150 - 450 10e3/uL Final           Signed Electronically by: Zia Shafer MD

## 2024-02-07 NOTE — LETTER
February 7, 2024      Colette Patricia  [address]   [place] [facility] [facility] [address]        To Whom It May Concern:    Colette Patricia was seen in our clinic.   I recommend for the patient to have close access to bathroom at work , because of symptoms of overactive bladder.      Sincerely,        Zia Shafer MD

## 2024-02-08 ENCOUNTER — APPOINTMENT (OUTPATIENT)
Dept: LAB | Facility: CLINIC | Age: 64
End: 2024-02-08
Payer: COMMERCIAL

## 2024-02-08 LAB
ALBUMIN UR-MCNC: NEGATIVE MG/DL
AMORPH CRY #/AREA URNS HPF: ABNORMAL /HPF
APPEARANCE UR: CLEAR
BACTERIA #/AREA URNS HPF: ABNORMAL /HPF
BILIRUB UR QL STRIP: NEGATIVE
COLOR UR AUTO: YELLOW
ERYTHROCYTE [DISTWIDTH] IN BLOOD BY AUTOMATED COUNT: 11.9 % (ref 10–15)
ERYTHROCYTE [SEDIMENTATION RATE] IN BLOOD BY WESTERGREN METHOD: 22 MM/HR (ref 0–30)
FOLATE SERPL-MCNC: 11.7 NG/ML (ref 4.6–34.8)
GLUCOSE UR STRIP-MCNC: NEGATIVE MG/DL
HCT VFR BLD AUTO: 36.4 % (ref 35–47)
HGB BLD-MCNC: 12.6 G/DL (ref 11.7–15.7)
HGB UR QL STRIP: NEGATIVE
KETONES UR STRIP-MCNC: NEGATIVE MG/DL
LEUKOCYTE ESTERASE UR QL STRIP: NEGATIVE
MCH RBC QN AUTO: 32.6 PG (ref 26.5–33)
MCHC RBC AUTO-ENTMCNC: 34.6 G/DL (ref 31.5–36.5)
MCV RBC AUTO: 94 FL (ref 78–100)
NITRATE UR QL: NEGATIVE
PH UR STRIP: 7 [PH] (ref 5–7)
PLATELET # BLD AUTO: 191 10E3/UL (ref 150–450)
RBC # BLD AUTO: 3.87 10E6/UL (ref 3.8–5.2)
RBC #/AREA URNS AUTO: ABNORMAL /HPF
SP GR UR STRIP: 1.01 (ref 1–1.03)
SQUAMOUS #/AREA URNS AUTO: ABNORMAL /LPF
UROBILINOGEN UR STRIP-ACNC: 0.2 E.U./DL
WBC # BLD AUTO: 5.3 10E3/UL (ref 4–11)
WBC #/AREA URNS AUTO: ABNORMAL /HPF

## 2024-02-08 PROCEDURE — 82746 ASSAY OF FOLIC ACID SERUM: CPT | Performed by: INTERNAL MEDICINE

## 2024-02-08 PROCEDURE — 82306 VITAMIN D 25 HYDROXY: CPT | Performed by: INTERNAL MEDICINE

## 2024-02-08 PROCEDURE — 82728 ASSAY OF FERRITIN: CPT | Performed by: INTERNAL MEDICINE

## 2024-02-08 PROCEDURE — 85027 COMPLETE CBC AUTOMATED: CPT | Performed by: INTERNAL MEDICINE

## 2024-02-08 PROCEDURE — 82607 VITAMIN B-12: CPT | Performed by: INTERNAL MEDICINE

## 2024-02-08 PROCEDURE — 84443 ASSAY THYROID STIM HORMONE: CPT | Performed by: INTERNAL MEDICINE

## 2024-02-08 PROCEDURE — 83540 ASSAY OF IRON: CPT | Performed by: INTERNAL MEDICINE

## 2024-02-08 PROCEDURE — 81001 URINALYSIS AUTO W/SCOPE: CPT | Performed by: INTERNAL MEDICINE

## 2024-02-08 PROCEDURE — 85652 RBC SED RATE AUTOMATED: CPT | Performed by: INTERNAL MEDICINE

## 2024-02-08 PROCEDURE — 83550 IRON BINDING TEST: CPT | Performed by: INTERNAL MEDICINE

## 2024-02-08 PROCEDURE — 80053 COMPREHEN METABOLIC PANEL: CPT | Performed by: INTERNAL MEDICINE

## 2024-02-08 PROCEDURE — 36415 COLL VENOUS BLD VENIPUNCTURE: CPT | Performed by: INTERNAL MEDICINE

## 2024-02-09 ENCOUNTER — APPOINTMENT (OUTPATIENT)
Dept: INTERPRETER SERVICES | Facility: CLINIC | Age: 64
End: 2024-02-09
Payer: COMMERCIAL

## 2024-02-09 LAB
ALBUMIN SERPL BCG-MCNC: 4.2 G/DL (ref 3.5–5.2)
ALP SERPL-CCNC: 56 U/L (ref 40–150)
ALT SERPL W P-5'-P-CCNC: 13 U/L (ref 0–50)
ANION GAP SERPL CALCULATED.3IONS-SCNC: 8 MMOL/L (ref 7–15)
AST SERPL W P-5'-P-CCNC: 23 U/L (ref 0–45)
BILIRUB SERPL-MCNC: 0.5 MG/DL
BUN SERPL-MCNC: 12.1 MG/DL (ref 8–23)
CALCIUM SERPL-MCNC: 9.5 MG/DL (ref 8.8–10.2)
CHLORIDE SERPL-SCNC: 108 MMOL/L (ref 98–107)
CREAT SERPL-MCNC: 0.63 MG/DL (ref 0.51–0.95)
DEPRECATED HCO3 PLAS-SCNC: 26 MMOL/L (ref 22–29)
EGFRCR SERPLBLD CKD-EPI 2021: >90 ML/MIN/1.73M2
FERRITIN SERPL-MCNC: 63 NG/ML (ref 11–328)
GLUCOSE SERPL-MCNC: 104 MG/DL (ref 70–99)
IRON BINDING CAPACITY (ROCHE): 275 UG/DL (ref 240–430)
IRON SATN MFR SERPL: 42 % (ref 15–46)
IRON SERPL-MCNC: 115 UG/DL (ref 37–145)
POTASSIUM SERPL-SCNC: 4.1 MMOL/L (ref 3.4–5.3)
PROT SERPL-MCNC: 6.8 G/DL (ref 6.4–8.3)
SODIUM SERPL-SCNC: 142 MMOL/L (ref 135–145)
TSH SERPL DL<=0.005 MIU/L-ACNC: 1.4 UIU/ML (ref 0.3–4.2)
VIT B12 SERPL-MCNC: 845 PG/ML (ref 232–1245)
VIT D+METAB SERPL-MCNC: 32 NG/ML (ref 20–50)

## 2024-02-12 ENCOUNTER — ANCILLARY PROCEDURE (OUTPATIENT)
Dept: BONE DENSITY | Facility: CLINIC | Age: 64
End: 2024-02-12
Attending: INTERNAL MEDICINE
Payer: COMMERCIAL

## 2024-02-12 DIAGNOSIS — Z78.0 MENOPAUSE: ICD-10-CM

## 2024-02-12 PROCEDURE — 77080 DXA BONE DENSITY AXIAL: CPT | Mod: TC | Performed by: RADIOLOGY

## 2024-02-12 NOTE — LETTER
February 13, 2024      Colette Elisabeth Patricia  [address]   [place] [address]        Dear ,    We are writing to inform you of your test results.    Your bone density shows slightly decreased bone density- osteopenia.   I recommend daily vitamin D 1000 units and calcium 1200 mg.     Resulted Orders   DX Hip/Pelvis/Spine    Narrative    EXAM: DX HIP/PELVIS/SPINE  LOCATION: [facility]  DATE: 2/12/2024    INDICATION:  Menopause  DEMOGRAPHICS: Age- 63 years. Gender- Female.  COMPARISON: 02/10/2020  TECHNIQUE: Dual-energy x-ray absorptiometry (DXA) performed with routine technique.    FINDINGS:    DXA RESULTS  -Lumbar Spine: L1-L4: BMD: 0.943 g/cm2. T-score: -2.0. Z-score: --.  -RIGHT Hip Total: BMD: 0.896 g/cm2. T-score: -0.9. Z-score: --.  -RIGHT Hip Femoral neck: BMD: 0.810 g/cm2. T-score: -1.6. Z-score: --.  -LEFT Hip Total: BMD: 0.902 g/cm2. T-score: -0.8. Z-score: --.  -LEFT Hip Femoral neck: BMD: 0.902 g/cm2. T-score: -0.8. Z-score: --.    WHO T-SCORE CRITERIA  -Normal: T score at or above -1 SD  -Osteopenia: T score between -1 and -2.5 SD  -Osteoporosis: T score at or below -2.5 SD    The World Health Organization (WHO) criteria is applicable to perimenopausal females, postmenopausal females, and men aged 50 years or older.    INTERVAL CHANGE  -There has been a 0.5% decrease in lumbar spine BMD.  -There has been a 5.3% decrease in bilateral hip BMD.    FRACTURE RISK  -FRAX Results: The 10 year probability of major osteoporotic fracture is 4.3%, and of hip fracture is 0.5%, based on right femoral neck BMD.    RECOMMENDATIONS  Consider treatment if major osteoporotic fracture score is greater than or equal to 20%, or if the hip fracture score is greater than or equal to 3%.      Impression    IMPRESSION: Low bone density (OSTEOPENIA). T score meets the WHO criteria for low bone density (osteopenia) at one or more measured sites. The risk of osteoporotic fracture increases  approximately two-fold for each standard deviation decrease in T-score.       If you have any questions or concerns, please call the clinic at the number listed above.       Sincerely,      Zia Shafer MD

## 2024-02-22 ENCOUNTER — TELEPHONE (OUTPATIENT)
Dept: GASTROENTEROLOGY | Facility: CLINIC | Age: 64
End: 2024-02-22
Payer: COMMERCIAL

## 2024-02-22 DIAGNOSIS — Z12.11 SPECIAL SCREENING FOR MALIGNANT NEOPLASMS, COLON: Primary | ICD-10-CM

## 2024-02-22 NOTE — LETTER
2/22/2024         RE: Colette Patricia  [address]  [place] [place] [facility] [address]          Golytely Extended Colonoscopy Prep  Prep instructions for colonoscopy   Pre-Assessment Phone Number: [facility]; [phone] option 4    Bowel prep was sent 4/24/2024 to [facility]/PHARMACY #[id number] - [place], [place] - [address]     Please read these instructions carefully at least 7 days prior to your colonoscopy procedure. Be sure to follow all directions completely. The inside of your colon must be clean to allow for a complete examination for the presence of any growths, polyps, and/or abnormalities, as well as their biopsy or removal. A number of tips are included in order to make this part of the procedure as comfortable as possible.    Immediately:  A nurse will call you to go over instructions and your health history.  It's important to complete the nurse assessment before your procedure. If you don't, your procedure might have to be canceled.  You must arrange for an adult to drive you home after your exam. Your colonoscopy cannot be done unless you have a ride. If you need to use public transportation, someone must ride with you and stay with you for a minimum of 6-24 hours.  Check with your insurance company to be sure they will cover this exam.Arrange for a responsible adult to drive you home on the day of the exam. This cannot be a taxi or a bus as you will need someone with you after the procedure.    7 days prior:  Talk to your prescribing provider: If you take blood thinners (such as Coumadin, Plavix, Xarelto, Eliquis, Lovenox, or others), these medications may need to be stopped temporarily before your procedure. Your prescribing provider will tell you what to do.   Talk to your prescribing provider: If you take prescription NSAIDS (such as Sulindac, Celebrex, Mobic, Relafen). Your prescribing provider will tell you what to do.   If you have diabetes, you should request an early morning  appointment.  Stop taking fiber supplements and multivitamins containing iron, or any other medications containing iron.  Fill your prescription for 2 containers of Golytely and 4 Dulcolax tablets at the pharmacy.  It is very important that you stay well hydrated during the colonoscopy prep. The Golytely bowel prep is designed to clean out your colon, but it will not provide hydration. While you are taking the prescribed prep, you should also drink 64 oz. of Gatorade or similar sports drink product to drink for staying hydrated. (Avoid red and purple colors)  Stop eating corn, popcorn, nuts and foods with seeds.   Begin a restricted or low fiber diet (see list below).    2 days prior:  Drink fluids to be well hydrated, this is important. Drink at least 4 large glasses of water, Gatorade, or other similar sports drinks.  It is a good idea to use Vaseline on the skin around your anus after each bowel movement to prevent irritation. Wet wipes also help to reduce irritation.   You can have a light, low-fiber breakfast. You may also have a light, low-fiber lunch.  No solid foods after 1pm. Begin a clear liquid diet. (see list below).  At 4pm, take 2 Dulcolax (bisacodyl) tablets.  At 5pm, mix and drink half of a jug of Golytely bowel prep. Drink an 8 oz. Glass of Golytely every 10-15 minutes until half of the jug is gone. Place the remainder of the Golytely in the refrigerator. Stay close to the bathroom.     1 day prior:  Continue clear liquid diet only (see examples below). Do not eat solid food this day.  Do not drink any red or purple colored drinks.  Stop taking NSAID pain relievers, such as Advil, Ibuprofen, Motrin, etc.  You may take Tylenol.  At 4 pm, take 2 Dulcolax (bisacodyl) tablets.  At 5 pm, drink the 2nd half of a jug of Golytely bowel prep. Drink an 8 oz. glass of Golytely every 10-15 minutes until the 1st jug of Golytely is gone.   The Golytely bowel prep will not keep you hydrated. You should drink 8-10  glasses of clear liquids throughout the day.  Before you go to bed, mix the 2nd container of Golytely and place in the refrigerator for the morning.      Procedure day:  6 hours before your check-in time, drink an 8 oz. Glass of Golytely every 10-15 minutes until half of the 2nd jug of Golytely is gone. You WILL NOT drink the entire 2nd jug of Golytely.   You may take your necessary morning medications with sips of water  Do not take diabetes medicine by mouth until after your exam.  You may drink clear liquids only up until 2 hours before your arrival time.  Do not smoke, chew tobacco, or swallow anything, including water or gum for at least 2 hours before your arrival time. This is a safety issue. Your procedure could be cancelled if you do not follow directions.  Please do not wear jewelry (i.e. earrings, rings, necklaces, watches, etc) . Leave your purse, billfold, credit cards, and other valuables at home.   Please arrive with a responsible adult who can take you home after the test and stay with you for a minimum of 24 hours: The medicine used will make you sleepy and forgetful. If you do not have someone to take you home, we will cancel your procedure. If using public transportation you must have someone to ride with you.  Please perform your nebulizer treatments and airway clearance therapy in the morning prior to the procedure (if applicable).  If you have asthma, bring your inhalers.    CLEAR LIQUID DIET   You may have:  Water, tea, coffee (no milk or cream)  Soda pop, Gatorade (not red or purple)  Jell-O, Popsicles (no milk or fruit pieces - not red or purple)  Fat-free soup broth or bouillon  Plain hard candy, such as clear life savers (not red or purple)  Clear juices and fruit-flavored drinks, such as apple juice, white grape juice, Hi-C, and Jian-Aid (not red or purple)   Do not have:  Milk or milk products such as ice cream, malts or shakes, or coffee creamer  Red or purple drinks of any kind such as  cranberry juice or grape juice. Avoid red or purple Jell-O, Popsicles, Jian-Aid, sorbet, sherbet and candy  Juices with pulp such as orange, grapefruit, pineapple or tomato juice  Cream soups of any kind  Alcohol and beer  Protein drinks or protein powder     LOW FIBER DIET   You may have:    Starches: White bread, rolls, biscuits, croissants, Dover toast, white flour tortillas, waffles, pancakes, Bengali toast; white rice, noodles, pasta, macaroni; cooked and peeled potatoes; plain crackers, saltines; cooked farina or cream of rice; puffed rice, corn flakes, Rice Krispies, Special K   Vegetables: tender cooked and canned, vegetable broths  Fruits and fruit juices: Strained fruit juice, canned fruit without seeds or skin (not pineapple), applesauce, pear sauce, ripe bananas, melons (not watermelon)   Milk products: Milk (plain or flavored), cheese, cottage cheese, yogurt (no berries), custard, ice cream    Proteins: Tender, well-cooked ground beef, lamb, veal, ham, pork, chicken, turkey, fish or organ meats; eggs; creamy peanut butter   Fats and condiments:  Margarine, butter, oils, mayonnaise, sour cream, salad dressing, plain gravy; spices, cooked herbs; sugar, clear jelly, honey, syrup   Snacks, sweets and drinks: Pretzels, hard candy; plain cakes and cookies (no nuts or seeds); gelatin, plain pudding, sherbet, Popsicles; coffee, tea, carbonated ( fizzy ) drinks Do not have:    Starches: Breads or rolls that contain nuts, seeds or fruit; whole wheat or whole grain breads that contain more than 1 gram of fiber per slice; cornbread; corn or whole wheat tortillas; potatoes with skin; brown rice, wild rice, kasha (buckwheat), and oatmeal   Vegetables: Any raw or steamed vegetables; vegetables with seeds; corn in any form   Fruits and fruit juices: Prunes, prune juice, raisins and other dried fruits, berries and other fruits with seeds, canned pineapple juices with pulp such as orange, grapefruit, pineapple or tomato  juice  Milk products: Any yogurt with nuts, seeds or berries   Proteins: Tough, fibrous meats with gristle; cooked dried beans, peas or lentils; crunchy peanut butter  Fats and condiments: Pickles, olives, relish, horseradish; jam, marmalade, preserves   Snacks, sweets and drinks: Popcorn, nuts, seeds, granola, coconut, candies made with nuts or seeds; all desserts that contain nuts, seeds, raisins and other dried fruits, coconut, whole grains or bran.      FAQ:    How do you know if your colon is cleaned out?   After completing the bowel prep, your bowel movements should be all liquid and yellow. Your bowel movements will look similar to urine in the toilet. If there are pieces of stool (poop) in the toilet, or if you can't see to the bottom of the toilet, please call our office for advice. Call [phone] and ask to speak with a nurse.   Why do you need a responsible  to take you home and stay with you?  We require a responsible adult to take you home for your safety. The sedation medicines used to relax you during the procedure can impair your judgement and reaction time, make you forgetful and possible a little unsteady. Do not drive, make any important decisions, or sign any legal documents for 24 hours after your procedure.   It is normal to feel bloated and gassy after your procedure. Walking will help move the air through your colon. You can take non-aspirin pain relievers that contain acetaminophen (Tylenol).   When can you eat after your procedure?  You may resume your normal diet when you feel ready, unless advised otherwise by the doctor performing your procedure. Do not drink alcohol for 24 hours after your procedure.   You many resume normal activities (work, exercise, etc.) after 24 hours.   When will you get test results?  You should have your procedure results and any lab results (if applicable) by letter, MyChart message, or phone call within 2 weeks. If you have any questions, please call  the doctor that referred you for the procedure.       Thank you for choosing [facility], for your procedure. If you are sent a survey regarding your care, please take the time to complete the questionnaire. We value your feedback!

## 2024-02-22 NOTE — TELEPHONE ENCOUNTER
"Endoscopy Scheduling Screen    Have you had a positive Covid test in the last 14 days?  No    Are you active on MyChart?   Yes    What insurance is in the chart?  Other:  [facility]    Ordering/Referring Provider:     GAIL CARLOS      (If ordering provider performs procedure, schedule with ordering provider unless otherwise instructed. )    BMI: Estimated body mass index is 25.24 kg/m  as calculated from the following:    Height as of 2/7/24: 1.676 m (5' 6\").    Weight as of 2/7/24: 70.9 kg (156 lb 6.4 oz).     Sedation Ordered  moderate sedation.   If patient BMI > 50 do not schedule in [facility].    If patient BMI > 45 do not schedule at [facility].    Are you taking methadone or Suboxone?  No    Have you had difficulties, pain, or discomfort during past endoscopy procedures?  No    Are you taking any prescription medications for pain 3 or more times per week?   NO - No RN review required.    Do you have a history of malignant hyperthermia or adverse reaction to anesthesia?  No    (Females) Are you currently pregnant?   No     Have you been diagnosed or told you have pulmonary hypertension?   No    Do you have an LVAD?  No    Have you been told you have moderate to severe sleep apnea?  No    Have you been told you have COPD, asthma, or any other lung disease?  No    Do you have any heart conditions?  No     Have you ever had an organ transplant?   No    Have you ever had or are you awaiting a heart or lung transplant?   No    Have you had a stroke or transient ischemic attack (TIA aka \"mini stroke\" in the last 6 months?   No    Have you been diagnosed with or been told you have cirrhosis of the liver?   No    Are you currently on dialysis?   No    Do you need assistance transferring?   No    BMI: Estimated body mass index is 25.24 kg/m  as calculated from the following:    Height as of 2/7/24: 1.676 m (5' 6\").    Weight as of 2/7/24: 70.9 kg (156 lb 6.4 oz).     Is patients BMI > 40 and scheduling location [facility]?  No    Do " you take an injectable medication for weight loss or diabetes (excluding insulin)?  No    Do you take the medication Naltrexone?  No    Do you take blood thinners?  No       Prep   Are you currently on dialysis or do you have chronic kidney disease?  No    Do you have a diagnosis of diabetes?  No    Do you have a diagnosis of cystic fibrosis (CF)?  No    On a regular basis do you go 3 -5 days between bowel movements?  Yes (Extended Prep)    BMI > 40?  No    Preferred Pharmacy:    [facility]/pharmacy #[id number] - [place], [place] - [address]  [address] [address]  Phone: [phone] Fax: [phone]      Final Scheduling Details   Colonoscopy prep sent?  N/A    Procedure scheduled  Colonoscopy    Surgeon:  ALISTAIR     Date of procedure:  05/23/2024     Pre-OP / PAC:   No - Not required for this site.    Location  [facility] - Per order.    Sedation   Moderate Sedation - Per order.      Patient Reminders:   You will receive a call from a Nurse to review instructions and health history.  This assessment must be completed prior to your procedure.  Failure to complete the Nurse assessment may result in the procedure being cancelled.      On the day of your procedure, please designate an adult(s) who can drive you home stay with you for the next 24 hours. The medicines used in the exam will make you sleepy. You will not be able to drive.      You cannot take public transportation, ride share services, or non-medical taxi service without a responsible caregiver.  Medical transport services are allowed with the requirement that a responsible caregiver will receive you at your destination.  We require that drivers and caregivers are confirmed prior to your procedure.

## 2024-03-04 ENCOUNTER — PATIENT OUTREACH (OUTPATIENT)
Dept: GERIATRIC MEDICINE | Facility: CLINIC | Age: 64
End: 2024-03-04
Payer: COMMERCIAL

## 2024-03-04 NOTE — LETTER
March 11, 2024    ANISH ROMERO  [address] [address]        Dear Anish:    As a member of [facility], we offer a health risk assessment at no cost to you. I know you don't want to have the assessment right now. If you change your mind, please call me at the number below.    Who performs the health risk assessment?  A [facility] Care Coordinator performs the assessment. Our Care Coordinators can also help you understand your benefits. They can tell you about services to aid you at home, such as managing your care with your doctors if your health worsens.    Our Care Coordinators are here for you if you need:  Support for activities you used to do by yourself (including making meals, bathing, and paying bills)  Equipment for bathroom or home safety  Help finding a new place to live  Information on staying healthy, preventing falls and immunizations    Questions?  If you have questions, or you would like to do the assessment, call me at [phone]. TTY users call [phone]. I'm here from 8am to 5pm. I may reach out to you again soon.      Sincerely,        Brandy Montiel RN    Email: [email or web address]  Phone: [phone]      [employer]    <[id number] accepted    [id number] (12/2021)  [id number]>

## 2024-03-04 NOTE — Clinical Note
Hi,  I am the [facility] care coordinator for Colette Patricia I am writing to inform you member denied need for HRA assessment. This Care Coordinator will continue to follow and reach out and offer assistance as needed.   All of my documentation can be found in EPIC. Please do not hesitate to contact me with any questions or concerns. Brandy Montiel RN [facility] P: [phone] Fax: [phone]

## 2024-03-11 NOTE — PROGRESS NOTES
[facility] Care Coordination Contact      [facility] Refusal Telephone Assessment    Member refused home visit HRA on 3/4/24 (reason: reports independent and working 4 days a week).    ER visits: No  Hospitalizations: No  Health concerns: No concerns at this time reports healthy. Requesting rides for work 4 days a week.   Falls/Injuries: No  ADL/IADL Dependencies: NA- reports independently        Member currently receiving the following home care services:   NA  Member currently receiving the following community resources:  [facility]  Informal support(s):  Family     Advanced Care Planning discussion, complete code section.    [facility] sponsored benefits: Shared information re: Silver Sneakers/gym memberships, ASA, Calcium +D.    Follow-Up Plan: Member informed of future contact, plan to f/u with member with a 6 month telephone assessment and offer a home visit.  Contact information shared with member and family, encouraged member to call with any questions or concerns at any time.    This CC note routed to PCP, Lillian Hansen.  Brandy Montiel RN  [facility]  P: [phone]  Fax: [phone]

## 2024-03-18 ENCOUNTER — TELEPHONE (OUTPATIENT)
Dept: INTERNAL MEDICINE | Facility: CLINIC | Age: 64
End: 2024-03-18
Payer: COMMERCIAL

## 2024-03-18 DIAGNOSIS — R11.0 NAUSEA: Primary | ICD-10-CM

## 2024-03-18 DIAGNOSIS — M85.88 OSTEOPENIA OF SPINE: ICD-10-CM

## 2024-03-18 RX ORDER — FAMOTIDINE 20 MG
1 TABLET ORAL DAILY
Qty: 90 CAPSULE | Refills: 3 | Status: ON HOLD | OUTPATIENT
Start: 2024-03-18 | End: 2024-05-23

## 2024-03-18 NOTE — TELEPHONE ENCOUNTER
Please review labs from 2/8/24 and advise.  Thanks!    Dexa results already reviewed and myc message sent to patient, but patient is not active in myc and has not seen the message.  Once provider has provided recommendations regarding lab work, please call patient to discuss results from labs and dexa.  Thanks!

## 2024-03-18 NOTE — TELEPHONE ENCOUNTER
Call to pt via  and advised. She agrees with this.     She is asking for prescription for the Vitamin D and Calcium, so she knows how much to take.     Pended.     She is asking for new order for the H Pylori, since order is not future. She would still like to do this test.     Please order and she will stop at the lab.     No call back needed.

## 2024-03-18 NOTE — TELEPHONE ENCOUNTER
Her bone density showed osteopenia, slightly decreased.   Recommend daily vit D 1000 units and calcium 600 mg 2 times daily.   No need for further lab work.

## 2024-03-21 ENCOUNTER — TRANSFERRED RECORDS (OUTPATIENT)
Dept: HEALTH INFORMATION MANAGEMENT | Facility: CLINIC | Age: 64
End: 2024-03-21
Payer: COMMERCIAL

## 2024-04-17 ENCOUNTER — PATIENT OUTREACH (OUTPATIENT)
Dept: GERIATRIC MEDICINE | Facility: CLINIC | Age: 64
End: 2024-04-17
Payer: COMMERCIAL

## 2024-04-17 NOTE — PROGRESS NOTES
[facility] Care Coordination Contact    CHW spoke w/ mbr to schedule Mammogram appt. Mbr declined Mammogram and asked not to be called for mammogram.     MORRO Kent  [facility]  [phone]

## 2024-04-17 NOTE — PROGRESS NOTES
[facility] Care Coordination Contact    CHW lvconstantin for mbr to schedule a Mammogram appt. CHW contact info was provided.     MORRO Knet  [facility]  [phone]

## 2024-04-19 ENCOUNTER — TELEPHONE (OUTPATIENT)
Dept: INTERNAL MEDICINE | Facility: CLINIC | Age: 64
End: 2024-04-19
Payer: COMMERCIAL

## 2024-04-19 DIAGNOSIS — K59.04 CHRONIC IDIOPATHIC CONSTIPATION: Primary | ICD-10-CM

## 2024-04-19 NOTE — TELEPHONE ENCOUNTER
Patient calls today asking for lab resutls from 2-8-2024. Patient was also asking about her 2 scripts, Calcium and the Alisa D. Patient was informed that those rx were sent to the pharmacy and did she pick them up 3- ? Patient stated that she only was given the Alisa D and would still like to get the calcium.     Patient went on to add that she is constipated  would like Miralax script sent as well.     Patient number [phone]

## 2024-04-23 RX ORDER — POLYETHYLENE GLYCOL 3350 17 G/17G
1 POWDER, FOR SOLUTION ORAL DAILY PRN
Qty: 510 G | Refills: 1 | Status: SHIPPED | OUTPATIENT
Start: 2024-04-23

## 2024-04-23 NOTE — TELEPHONE ENCOUNTER
Her test results look good.   She has not done the H pylori test yet, recommend to do it.   Will call in [facility] for constipation.

## 2024-04-24 RX ORDER — BISACODYL 5 MG/1
TABLET, DELAYED RELEASE ORAL
Qty: 4 TABLET | Refills: 0 | Status: ON HOLD | OUTPATIENT
Start: 2024-04-24 | End: 2024-05-23

## 2024-04-24 NOTE — TELEPHONE ENCOUNTER
Extended Golytely Bowel Prep . Instructions were sent via letter. Bowel prep was sent 4/24/2024 to    [facility]/PHARMACY #[id number] - [place], [place] - [address].

## 2024-05-14 ENCOUNTER — TELEPHONE (OUTPATIENT)
Dept: GASTROENTEROLOGY | Facility: CLINIC | Age: 64
End: 2024-05-14
Payer: COMMERCIAL

## 2024-05-14 NOTE — TELEPHONE ENCOUNTER
Pre visit planning completed.      Procedure details:    Patient scheduled for Colonoscopy  on 05.23.2024.     Arrival time: 0835. Procedure time 0920    Facility location: [facility]; [address] [place] [address]. Check in location: Main entrance at . Come through the roundabout underneath the awning (not the ER entrance).    Sedation type: Conscious sedation     Pre op exam needed? N/A    Indication for procedure: Colon cancer screening       Chart review:     Electronic implanted devices? No    Recent diagnosis of diverticulitis within the last 6 weeks? No    Diabetic? No      Medication review:    Anticoagulants? No    NSAIDS? No NSAID medications per patient's medication list.  RN will verify with pre-assessment call.    Other medication HOLDING recommendations:  Ferrous sulfate (iron supplements): HOLD 7 days before procedure.      Prep for procedure:     Bowel prep recommendation: Extended Golytely. Bowel prep prescription sent to    [facility]/PHARMACY #[id number] [facility] [address]   Due to: constipation noted or reported.     Prep instructions sent via [employer]. CRC team also sent a letter.         Dayanara Cervantes RN  Endoscopy Procedure Pre Assessment RN  [phone] option 4

## 2024-05-14 NOTE — TELEPHONE ENCOUNTER
Attempted to contact patient in order to complete pre assessment questions with Panamanian  id [id number]    No answer. Left message to return call to [phone] option 4    Callback required communication sent via [employer].      Dayanara Cervantes RN  Endoscopy Procedure Pre Assessment RN

## 2024-05-16 NOTE — TELEPHONE ENCOUNTER
Second call attempt to complete pre assessment with the assistance of Navya  ID [id number].     No answer.  Left message to return call to [phone] #4 by next business day prior to 4PM or procedure will be sent to cancel.     Callback required communication sent via [employer].      Idalia Betts RN  Endoscopy Procedure Pre Assessment RN

## 2024-05-17 ENCOUNTER — TELEPHONE (OUTPATIENT)
Dept: GASTROENTEROLOGY | Facility: CLINIC | Age: 64
End: 2024-05-17
Payer: COMMERCIAL

## 2024-05-17 NOTE — TELEPHONE ENCOUNTER
The Pt did return our call. She needs to reschedule for a different date and time. Transferred the Pt and  to the scheduling line.     Desirae Michaels, RN   Endoscopy Procedure Pre Assessment RN

## 2024-05-17 NOTE — TELEPHONE ENCOUNTER
Caller: Colette Patricia   Reason for Reschedule/Cancellation (please be detailed, any staff messages or encounters to note?):     Per pt -- change date       Prior to reschedule please review:  Ordering Provider:    Zia Shafer M      Sedation Determined: moderate   Does patient have any ASC Exclusions, please identify?: n       Notes on Cancelled Procedure:  Procedure:Lower Endoscopy [Colonoscopy]   Date: 05/23/2024  Location:[facility]; [address] [place] [address]  Surgeon: Anshul         Rescheduled: Yes   Procedure: Lower Endoscopy [Colonoscopy]  Date: 05/24/2024  Location: [facility]; [address] [place] [address]  Surgeon: Adithya   Sedation Level Scheduled  moderate          Reason for Sedation Level per order   Prep/Instructions updated and sent: mychart     Does patient need PAC or Pre -Op Rescheduled? : n       Send In - basket message to Panc - Ellis Pool if EUS procedure is canceled or rescheduled: [ N/A, YES or NO] n/a

## 2024-05-17 NOTE — TELEPHONE ENCOUNTER
Rescheduled procedure.    Pre assessment completed for upcoming procedure with Navya  [id number].      Procedure details:    Patient scheduled for Colonoscopy  on 5/24/24.     Arrival time: 1430. Procedure time 1515    Facility location: [facility]; [place] [address]. Check in location: Main entrance at . Come through the roundabout underneath the awning (not the ER entrance).    Sedation type: Conscious sedation     Pre op exam needed? N/A    Indication for procedure: screening     Designated  policy reviewed. Instructed to have someone stay 6 hours post procedure.       Chart review:     Electronic implanted devices? No    Recent diagnosis of diverticulitis within the last 6 weeks?  No    Diabetic? No      Medication review:    Anticoagulants? No    NSAIDS? Yes.  Naproxen (Naprosyn, Aleve).  Holding interval of 4 days.    Other medication HOLDING recommendations:  Ferrous sulfate (iron supplements): HOLD 7 days before procedure.      Prep for procedure:     Bowel prep recommendation: Extended Golytely. Bowel prep prescription sent to  [facility]/PHARMACY #[id number] [place], [place] - [address] by CRC team.    Due to: constipation noted or reported.     Prep instructions sent via letter by CRC team.     Reviewed procedure prep instructions in great detail. 30+ min on the phone with patient. Patient stated they had received prep instructions but they had not paid attention to them.     Patient verbalized understanding and had no questions or concerns at this time.        Idalia Betts RN  Endoscopy Procedure Pre Assessment RN  [phone] option 4

## 2024-05-17 NOTE — TELEPHONE ENCOUNTER
Caller: Colette Patricia     Reason for Reschedule/Cancellation   (please be detailed, any staff messages or encounters to note?): wants to move back to her original date has a  now      Prior to reschedule please review:  Ordering Provider: Jade  Sedation Determined: moderate  Does patient have any ASC Exclusions, please identify?: n      Notes on Cancelled Procedure:  Procedure: Lower Endoscopy [Colonoscopy]   Date: 5/24  Location: [facility]; [address] [place] [address]  Surgeon: Krishna      Rescheduled: Yes,   Procedure: Lower Endoscopy [Colonoscopy]    Date: 5/23   Location: [facility]; [address] [place] [address]   Surgeon: Kishor   Sedation Level Scheduled  moderate ,  Reason for Sedation Level ordered   Instructions updated and sent: y     Does patient need PAC or Pre -Op Rescheduled? : no       Did you cancel or rescheduled an EUS procedure? No.

## 2024-05-23 ENCOUNTER — HOSPITAL ENCOUNTER (OUTPATIENT)
Facility: CLINIC | Age: 64
Discharge: HOME | End: 2024-05-23
Attending: SURGERY | Admitting: SURGERY
Payer: COMMERCIAL

## 2024-05-23 VITALS
OXYGEN SATURATION: 98 % | BODY MASS INDEX: 24.11 KG/M2 | WEIGHT: 150 LBS | HEART RATE: 70 BPM | DIASTOLIC BLOOD PRESSURE: 75 MMHG | RESPIRATION RATE: 16 BRPM | HEIGHT: 66 IN | SYSTOLIC BLOOD PRESSURE: 118 MMHG

## 2024-05-23 LAB — COLONOSCOPY: NORMAL

## 2024-05-23 PROCEDURE — 45380 COLONOSCOPY AND BIOPSY: CPT | Mod: PT | Performed by: SURGERY

## 2024-05-23 PROCEDURE — 88305 TISSUE EXAM BY PATHOLOGIST: CPT | Mod: TC | Performed by: SURGERY

## 2024-05-23 PROCEDURE — 250N000011 HC RX IP 250 OP 636: Performed by: SURGERY

## 2024-05-23 PROCEDURE — G0500 MOD SEDAT ENDO SERVICE >5YRS: HCPCS | Mod: PT | Performed by: SURGERY

## 2024-05-23 PROCEDURE — 88305 TISSUE EXAM BY PATHOLOGIST: CPT | Mod: 26 | Performed by: PATHOLOGY

## 2024-05-23 PROCEDURE — 99153 MOD SED SAME PHYS/QHP EA: CPT | Mod: PT | Performed by: SURGERY

## 2024-05-23 RX ORDER — PROCHLORPERAZINE MALEATE 5 MG
5 TABLET ORAL EVERY 6 HOURS PRN
Status: DISCONTINUED | OUTPATIENT
Start: 2024-05-23 | End: 2024-05-23 | Stop reason: HOSPADM

## 2024-05-23 RX ORDER — NALOXONE HYDROCHLORIDE 0.4 MG/ML
0.2 INJECTION, SOLUTION INTRAMUSCULAR; INTRAVENOUS; SUBCUTANEOUS
Status: DISCONTINUED | OUTPATIENT
Start: 2024-05-23 | End: 2024-05-23 | Stop reason: HOSPADM

## 2024-05-23 RX ORDER — NALOXONE HYDROCHLORIDE 0.4 MG/ML
0.4 INJECTION, SOLUTION INTRAMUSCULAR; INTRAVENOUS; SUBCUTANEOUS
Status: DISCONTINUED | OUTPATIENT
Start: 2024-05-23 | End: 2024-05-23 | Stop reason: HOSPADM

## 2024-05-23 RX ORDER — SIMETHICONE 40MG/0.6ML
133 SUSPENSION, DROPS(FINAL DOSAGE FORM)(ML) ORAL
Status: DISCONTINUED | OUTPATIENT
Start: 2024-05-23 | End: 2024-05-23 | Stop reason: HOSPADM

## 2024-05-23 RX ORDER — ONDANSETRON 4 MG/1
4 TABLET, ORALLY DISINTEGRATING ORAL EVERY 6 HOURS PRN
Status: DISCONTINUED | OUTPATIENT
Start: 2024-05-23 | End: 2024-05-23 | Stop reason: HOSPADM

## 2024-05-23 RX ORDER — DIPHENHYDRAMINE HYDROCHLORIDE 50 MG/ML
25-50 INJECTION INTRAMUSCULAR; INTRAVENOUS
Status: DISCONTINUED | OUTPATIENT
Start: 2024-05-23 | End: 2024-05-23 | Stop reason: HOSPADM

## 2024-05-23 RX ORDER — FLUMAZENIL 0.1 MG/ML
0.2 INJECTION, SOLUTION INTRAVENOUS
Status: DISCONTINUED | OUTPATIENT
Start: 2024-05-23 | End: 2024-05-23 | Stop reason: HOSPADM

## 2024-05-23 RX ORDER — ATROPINE SULFATE 0.1 MG/ML
1 INJECTION INTRAVENOUS
Status: DISCONTINUED | OUTPATIENT
Start: 2024-05-23 | End: 2024-05-23 | Stop reason: HOSPADM

## 2024-05-23 RX ORDER — EPINEPHRINE 1 MG/ML
0.1 INJECTION, SOLUTION INTRAMUSCULAR; SUBCUTANEOUS
Status: DISCONTINUED | OUTPATIENT
Start: 2024-05-23 | End: 2024-05-23 | Stop reason: HOSPADM

## 2024-05-23 RX ORDER — LIDOCAINE 40 MG/G
CREAM TOPICAL
Status: DISCONTINUED | OUTPATIENT
Start: 2024-05-23 | End: 2024-05-23 | Stop reason: HOSPADM

## 2024-05-23 RX ORDER — ONDANSETRON 2 MG/ML
4 INJECTION INTRAMUSCULAR; INTRAVENOUS
Status: DISCONTINUED | OUTPATIENT
Start: 2024-05-23 | End: 2024-05-23 | Stop reason: HOSPADM

## 2024-05-23 RX ORDER — ONDANSETRON 2 MG/ML
4 INJECTION INTRAMUSCULAR; INTRAVENOUS EVERY 6 HOURS PRN
Status: DISCONTINUED | OUTPATIENT
Start: 2024-05-23 | End: 2024-05-23 | Stop reason: HOSPADM

## 2024-05-23 RX ORDER — FENTANYL CITRATE 50 UG/ML
50-100 INJECTION, SOLUTION INTRAMUSCULAR; INTRAVENOUS EVERY 5 MIN PRN
Status: DISCONTINUED | OUTPATIENT
Start: 2024-05-23 | End: 2024-05-23 | Stop reason: HOSPADM

## 2024-05-23 RX ADMIN — FENTANYL CITRATE 100 MCG: 50 INJECTION, SOLUTION INTRAMUSCULAR; INTRAVENOUS at 09:24

## 2024-05-23 RX ADMIN — MIDAZOLAM 2 MG: 1 INJECTION INTRAMUSCULAR; INTRAVENOUS at 09:24

## 2024-05-23 ASSESSMENT — ACTIVITIES OF DAILY LIVING (ADL)
ADLS_ACUITY_SCORE: 35
ADLS_ACUITY_SCORE: 35

## 2024-05-23 NOTE — LETTER
May 28, 2024      Colette Patricia  [address]   [facility] [address]        Dear ,    We are writing to inform you of your test results.    {results letter list:030016}    Resulted Orders   Surgical Pathology Exam   Result Value Ref Range    Case Report       Surgical Pathology Report                         Case: [id number]                                  Authorizing Provider:  Francine Landers MD       Collected:           05/23/2024 09:43 AM          Ordering Location:     [facility]          Received:            05/23/2024 10:24 AM                                 [facility]                                                         Pathologist:           Mayo Iniguez MD                                                            Specimens:   A) - Large Intestine, Colon, Descending, Descending colon polyp                                     B) - Rectum, Rectum polyp                                                                  Final Diagnosis       A. Colon, descending, polypectomy:  --Tubular adenoma.    B. Colon, rectum, polypectomy:  --Tubular adenoma.          Clinical Information       Procedure:  Colonoscopy with polypectomies by jumbo biopsy forceps  Pre-op Diagnosis: Screen for colon cancer [Z12.11]  Post-op Diagnosis: Z12.11 - Screen for colon cancer [ICD-10-CM]      Gross Description       A(1). Large Intestine, Colon, Descending, Descending colon polyp:  The specimen is received in formalin, labeled with the patient's name, medical record number and other identifying information and designated  descending colon polyp . It consists of a single tan soft tissue fragment measuring 0.2 cm. Entirely submitted in one cassette.    B(2). Rectum, Rectum polyp:  The specimen is received in formalin, labeled with the patient's name, medical record number and other identifying information and designated  rectum polyp . It consists of a single tan soft tissue fragment measuring  0.5 cm. Entirely submitted in one cassette.   (HYUN Mensah) 5/23/2024 1:55 PM       Microscopic Description       Microscopic examination was performed.        Performing Labs       The technical component of this testing was completed at [facility].    Stain controls for all stains resulted within this report have been reviewed and show appropriate reactivity.       Case Images         If you have any questions or concerns, please call the clinic at the number listed above.       Sincerely,      Francine Landers MD

## 2024-05-23 NOTE — H&P
[facility] Anesthesia Pre-op History and Physical    Colette Patricia MRN# [id number]   Age: 64 year old YOB: 1960      Date of Surgery: 05/23/24   [facility]      Date of Exam 5/23/2024 Facility (Same day)       Primary care provider: Lillian Hansen         Chief Complaint and/or Reason for Procedure:   screening colonoscopy         Active problem list:     Patient Active Problem List    Diagnosis Date Noted    Anemia, iron deficiency 06/27/2023     Priority: Medium    Osteopenia of spine 02/19/2018     Priority: Medium    Gastroesophageal reflux disease without esophagitis 07/21/2017     Priority: Medium    Foraminal stenosis of lumbosacral region 07/21/2017     Priority: Medium    Fatigue, unspecified type 07/21/2017     Priority: Medium    Vitamin D deficiency 07/21/2017     Priority: Medium    Chronic left-sided low back pain without sciatica 09/16/2016     Priority: Medium    Gastroesophageal reflux disease, esophagitis presence not specified 04/26/2016     Priority: Medium     IMO Regulatory Load OCT 2020      Mixed hyperlipidemia 11/30/2015     Priority: Medium    Hyperlipidemia with target LDL less than 130 11/30/2015     Priority: Medium    Advanced directives, counseling/discussion 09/11/2013     Priority: Medium     Discussed Advance Directive planning with patient; information given to patient to review.      GERD (gastroesophageal reflux disease) 09/05/2012     Priority: Medium    Low back pain 03/24/2010     Priority: Medium     Diagnosis updated by automated process. Provider to review and confirm.      Other unspecified back disorder 12/06/2005     Priority: Medium    Generalized osteoarthrosis, unspecified site 06/22/2005     Priority: Medium    Headache 08/09/2004     Priority: Medium     Problem list name updated by automated process. Provider to review      Insomnia 08/09/2004     Priority: Medium     Problem list name updated by automated  "process. Provider to review              Medications (include herbals and vitamins):     Current Outpatient Medications   Medication Instructions    bisacodyl (DULCOLAX) 5 MG EC tablet Two days prior to exam take two (2) tablets at 4pm. One day prior to exam take two (2) tablets at 4pm    calcium carbonate (OS-PATITO) 600 mg, Oral, 2 TIMES DAILY WITH MEALS    ferrous sulfate (FE TABS) 325 mg, Oral, DAILY    polyethylene glycol (GOLYTELY) 236 g suspension Take as directed in colonoscopy prep instructions    polyethylene glycol (MIRALAX) 17 GM/Dose powder 1 Capful, Oral, DAILY PRN    vitamin D3 (CHOLECALCIFEROL) 50,000 Units, Oral, EVERY 7 DAYS                Allergies:      Allergies   Allergen Reactions    No Known Drug Allergy                Physical Exam:   All vitals have been reviewed  Patient Vitals for the past 8 hrs:   Height Weight   05/23/24 0840 1.676 m (5' 6\") 68 kg (150 lb)     Airway assessment:   Mallampatti classification: Class II (visualization of the soft palate, fauces, and uvula)}     Lungs:   No increased work of breathing, good air exchange, clear to auscultation bilaterally     Cardiovascular:   regular rate and rhythm             Lab / Radiology Results:   N/a        Anesthetic risk and/or ASA classification:   asa2    Francine Landers MD       "

## 2024-05-24 LAB
PATH REPORT.COMMENTS IMP SPEC: NORMAL
PATH REPORT.COMMENTS IMP SPEC: NORMAL
PATH REPORT.FINAL DX SPEC: NORMAL
PATH REPORT.GROSS SPEC: NORMAL
PATH REPORT.MICROSCOPIC SPEC OTHER STN: NORMAL
PATH REPORT.RELEVANT HX SPEC: NORMAL
PHOTO IMAGE: NORMAL

## 2024-06-06 ENCOUNTER — PATIENT OUTREACH (OUTPATIENT)
Dept: GASTROENTEROLOGY | Facility: CLINIC | Age: 64
End: 2024-06-06
Payer: COMMERCIAL

## 2024-07-25 ENCOUNTER — TRANSFERRED RECORDS (OUTPATIENT)
Dept: HEALTH INFORMATION MANAGEMENT | Facility: CLINIC | Age: 64
End: 2024-07-25
Payer: COMMERCIAL

## 2024-08-08 ENCOUNTER — TELEPHONE (OUTPATIENT)
Dept: INTERNAL MEDICINE | Facility: CLINIC | Age: 64
End: 2024-08-08
Payer: COMMERCIAL

## 2024-08-08 NOTE — TELEPHONE ENCOUNTER
Patient Quality Outreach    Patient is due for the following:   Breast Cancer Screening - Mammogram      Topic Date Due    Zoster (Shingles) Vaccine (1 of 2) Never done    Polio Vaccine (2 of 3 - Adult catch-up series) 07/06/2010    Pneumococcal Vaccine (1 of 1 - PCV) Never done    COVID-19 Vaccine (1 - 2023-24 season) Never done       Next Steps:   Schedule a office visit for Breast Cancer Screening - Mammogram    Type of outreach:    Sent letter.      Questions for provider review:    None           Trish Westfall MA  Chart routed to none.

## 2024-08-08 NOTE — LETTER
August 8, 2024    To  Colette Patricia  [address]   [facility] [address]    Your team at [facility] cares about your health. We have reviewed your chart and based on our findings; we are making the following recommendations to better manage your health.     You are in particular need of attention regarding the following:     Schedule Annual MAMMOGRAPHY. [facility] scheduling number is [phone] or schedule in [employer] (self referral).    If you have already completed these items, please contact the clinic via phone or   [employer] so your care team can review and update your records. Thank you for   choosing [facility]ics for your healthcare needs. For any questions,   concerns, or to schedule an appointment please contact our clinic.    Healthy Regards,      Your [facility] Care Team

## 2024-08-27 ENCOUNTER — PATIENT OUTREACH (OUTPATIENT)
Dept: GERIATRIC MEDICINE | Facility: CLINIC | Age: 64
End: 2024-08-27
Payer: COMMERCIAL

## 2024-08-27 NOTE — PROGRESS NOTES
[facility] Care Coordination Contact      [facility] Six-Month Telephone Assessment    6 month telephone assessment completed on 8/27/24.    ER visits: No  Hospitalizations: No  TCU stays: No  Significant health status changes: reports she continues to struggle with back pain and had seen PT a few times however that flared up her back pain. Shares she has been working only 2 days a week due to her pain but overall is doing good. Not in need of additional assistance at this time. Shares she has an apt on 9/19 for follow up with doctor to review options for surgery with spinal MD. Reports she has re-established care with a [facility] primary doctor. Reviewed change in care coordinator will occur in Oct but if additional needs arise before then to reach out to this CC  Falls/Injuries: No  ADL/IADL changes: No  Changes in services: No    Caregiver Assessment follow up:  NA- reports independence    Goals: See Support Plan for goal progress documentation.      Will see member in 6 months for an annual health risk assessment.   Encouraged member to call CC with any questions or concerns in the meantime.   Brandy Montiel RN  [facility]  P: [phone]  Fax: [phone]

## 2024-08-31 ENCOUNTER — HEALTH MAINTENANCE LETTER (OUTPATIENT)
Age: 64
End: 2024-08-31

## 2024-10-02 ENCOUNTER — PATIENT OUTREACH (OUTPATIENT)
Dept: GERIATRIC MEDICINE | Facility: CLINIC | Age: 64
End: 2024-10-02
Payer: COMMERCIAL

## 2024-10-02 NOTE — PROGRESS NOTES
[facility] Care Coordination Contact    No longer active with [facility] case management effective 9/30/24.  Reason for community disenrollment: Change Care System/PCC to [facility] in [place].   UTF completed and disenrollment processed.   Brandy Montiel RN  [facility]  P: [phone]  Fax: [phone]

## 2024-11-03 ENCOUNTER — APPOINTMENT (OUTPATIENT)
Dept: CT IMAGING | Facility: CLINIC | Age: 64
End: 2024-11-03
Attending: EMERGENCY MEDICINE
Payer: COMMERCIAL

## 2024-11-03 ENCOUNTER — HOSPITAL ENCOUNTER (EMERGENCY)
Facility: CLINIC | Age: 64
Discharge: HOME | End: 2024-11-03
Attending: EMERGENCY MEDICINE | Admitting: EMERGENCY MEDICINE
Payer: COMMERCIAL

## 2024-11-03 ENCOUNTER — APPOINTMENT (OUTPATIENT)
Dept: MRI IMAGING | Facility: CLINIC | Age: 64
End: 2024-11-03
Attending: EMERGENCY MEDICINE
Payer: COMMERCIAL

## 2024-11-03 VITALS
BODY MASS INDEX: 26.22 KG/M2 | DIASTOLIC BLOOD PRESSURE: 71 MMHG | SYSTOLIC BLOOD PRESSURE: 122 MMHG | RESPIRATION RATE: 20 BRPM | HEIGHT: 66 IN | WEIGHT: 163.14 LBS | OXYGEN SATURATION: 99 % | HEART RATE: 90 BPM | TEMPERATURE: 99.7 F

## 2024-11-03 DIAGNOSIS — T40.2X5A OPIOID-INDUCED CONSTIPATION: ICD-10-CM

## 2024-11-03 DIAGNOSIS — K59.03 OPIOID-INDUCED CONSTIPATION: ICD-10-CM

## 2024-11-03 DIAGNOSIS — N99.89 POSTOPERATIVE URINARY RETENTION: ICD-10-CM

## 2024-11-03 DIAGNOSIS — R33.8 POSTOPERATIVE URINARY RETENTION: ICD-10-CM

## 2024-11-03 LAB
ALBUMIN SERPL BCG-MCNC: 3.1 G/DL (ref 3.5–5.2)
ALP SERPL-CCNC: 106 U/L (ref 40–150)
ALT SERPL W P-5'-P-CCNC: 55 U/L (ref 0–50)
ANION GAP SERPL CALCULATED.3IONS-SCNC: 11 MMOL/L (ref 7–15)
AST SERPL W P-5'-P-CCNC: 49 U/L (ref 0–45)
BASOPHILS # BLD AUTO: 0 10E3/UL (ref 0–0.2)
BASOPHILS NFR BLD AUTO: 0 %
BILIRUB SERPL-MCNC: 0.5 MG/DL
BUN SERPL-MCNC: 6.3 MG/DL (ref 8–23)
CALCIUM SERPL-MCNC: 8.4 MG/DL (ref 8.8–10.4)
CHLORIDE SERPL-SCNC: 99 MMOL/L (ref 98–107)
CREAT SERPL-MCNC: 0.55 MG/DL (ref 0.51–0.95)
EGFRCR SERPLBLD CKD-EPI 2021: >90 ML/MIN/1.73M2
EOSINOPHIL # BLD AUTO: 0.1 10E3/UL (ref 0–0.7)
EOSINOPHIL NFR BLD AUTO: 1 %
ERYTHROCYTE [DISTWIDTH] IN BLOOD BY AUTOMATED COUNT: 12.2 % (ref 10–15)
GLUCOSE SERPL-MCNC: 99 MG/DL (ref 70–99)
HCO3 SERPL-SCNC: 23 MMOL/L (ref 22–29)
HCT VFR BLD AUTO: 28.9 % (ref 35–47)
HGB BLD-MCNC: 10 G/DL (ref 11.7–15.7)
IMM GRANULOCYTES # BLD: 0 10E3/UL
IMM GRANULOCYTES NFR BLD: 0 %
LIPASE SERPL-CCNC: 11 U/L (ref 13–60)
LYMPHOCYTES # BLD AUTO: 1.3 10E3/UL (ref 0.8–5.3)
LYMPHOCYTES NFR BLD AUTO: 13 %
MCH RBC QN AUTO: 32.2 PG (ref 26.5–33)
MCHC RBC AUTO-ENTMCNC: 34.6 G/DL (ref 31.5–36.5)
MCV RBC AUTO: 93 FL (ref 78–100)
MONOCYTES # BLD AUTO: 1 10E3/UL (ref 0–1.3)
MONOCYTES NFR BLD AUTO: 10 %
NEUTROPHILS # BLD AUTO: 7.1 10E3/UL (ref 1.6–8.3)
NEUTROPHILS NFR BLD AUTO: 75 %
NRBC # BLD AUTO: 0 10E3/UL
NRBC BLD AUTO-RTO: 0 /100
PLATELET # BLD AUTO: 232 10E3/UL (ref 150–450)
POTASSIUM SERPL-SCNC: 4.4 MMOL/L (ref 3.4–5.3)
PROT SERPL-MCNC: 6 G/DL (ref 6.4–8.3)
RBC # BLD AUTO: 3.11 10E6/UL (ref 3.8–5.2)
SODIUM SERPL-SCNC: 133 MMOL/L (ref 135–145)
WBC # BLD AUTO: 9.5 10E3/UL (ref 4–11)

## 2024-11-03 PROCEDURE — 36415 COLL VENOUS BLD VENIPUNCTURE: CPT | Performed by: EMERGENCY MEDICINE

## 2024-11-03 PROCEDURE — 74177 CT ABD & PELVIS W/CONTRAST: CPT

## 2024-11-03 PROCEDURE — 80053 COMPREHEN METABOLIC PANEL: CPT | Performed by: EMERGENCY MEDICINE

## 2024-11-03 PROCEDURE — 85004 AUTOMATED DIFF WBC COUNT: CPT | Performed by: EMERGENCY MEDICINE

## 2024-11-03 PROCEDURE — 250N000011 HC RX IP 250 OP 636: Performed by: EMERGENCY MEDICINE

## 2024-11-03 PROCEDURE — A9585 GADOBUTROL INJECTION: HCPCS | Performed by: EMERGENCY MEDICINE

## 2024-11-03 PROCEDURE — 250N000013 HC RX MED GY IP 250 OP 250 PS 637: Performed by: EMERGENCY MEDICINE

## 2024-11-03 PROCEDURE — 255N000002 HC RX 255 OP 636: Performed by: EMERGENCY MEDICINE

## 2024-11-03 PROCEDURE — 72158 MRI LUMBAR SPINE W/O & W/DYE: CPT

## 2024-11-03 PROCEDURE — 83690 ASSAY OF LIPASE: CPT | Performed by: EMERGENCY MEDICINE

## 2024-11-03 PROCEDURE — 99285 EMERGENCY DEPT VISIT HI MDM: CPT | Mod: 25

## 2024-11-03 RX ORDER — HYDROMORPHONE HYDROCHLORIDE 2 MG/1
4 TABLET ORAL ONCE
Status: COMPLETED | OUTPATIENT
Start: 2024-11-03 | End: 2024-11-03

## 2024-11-03 RX ORDER — POLYETHYLENE GLYCOL 3350 17 G/17G
POWDER, FOR SOLUTION ORAL
Qty: 527 G | Refills: 0 | Status: SHIPPED | OUTPATIENT
Start: 2024-11-03

## 2024-11-03 RX ORDER — ACETAMINOPHEN 500 MG
1000 TABLET ORAL ONCE
Status: COMPLETED | OUTPATIENT
Start: 2024-11-03 | End: 2024-11-03

## 2024-11-03 RX ORDER — IOPAMIDOL 755 MG/ML
80 INJECTION, SOLUTION INTRAVASCULAR ONCE
Status: COMPLETED | OUTPATIENT
Start: 2024-11-03 | End: 2024-11-03

## 2024-11-03 RX ORDER — GADOBUTROL 604.72 MG/ML
7.5 INJECTION INTRAVENOUS ONCE
Status: COMPLETED | OUTPATIENT
Start: 2024-11-03 | End: 2024-11-03

## 2024-11-03 RX ADMIN — HYDROMORPHONE HYDROCHLORIDE 4 MG: 2 TABLET ORAL at 14:40

## 2024-11-03 RX ADMIN — GADOBUTROL 7.5 ML: 604.72 INJECTION INTRAVENOUS at 13:24

## 2024-11-03 RX ADMIN — ACETAMINOPHEN 1000 MG: 500 TABLET, FILM COATED ORAL at 14:39

## 2024-11-03 RX ADMIN — IOPAMIDOL 80 ML: 755 INJECTION, SOLUTION INTRAVENOUS at 13:15

## 2024-11-03 ASSESSMENT — ACTIVITIES OF DAILY LIVING (ADL)
ADLS_ACUITY_SCORE: 0

## 2024-11-03 ASSESSMENT — COLUMBIA-SUICIDE SEVERITY RATING SCALE - C-SSRS
2. HAVE YOU ACTUALLY HAD ANY THOUGHTS OF KILLING YOURSELF IN THE PAST MONTH?: NO
1. IN THE PAST MONTH, HAVE YOU WISHED YOU WERE DEAD OR WISHED YOU COULD GO TO SLEEP AND NOT WAKE UP?: NO
6. HAVE YOU EVER DONE ANYTHING, STARTED TO DO ANYTHING, OR PREPARED TO DO ANYTHING TO END YOUR LIFE?: NO

## 2024-11-03 NOTE — ED PROVIDER NOTES
Emergency Department Note      History of Present Illness     Chief Complaint   Abdominal Pain      HPI   Colette Patricia is a 64 year old female with history back pain, lumbosacral spondylolisthesis and radiculopathy status post recent laminectomy L5-S1 with posterior spinal fusion L5-S1 at [facility].  Surgery was on 10/29/2024.  She presents to the emergency department today  for evaluation of abdominal pain and urinary retention. The patient's son reports Colette has been having difficulty urinating since 8 PM yesterday.  She notes that she can get a trickle of urine out but he does not have her normal urinary stream.  She denies any saddle anesthesia.  She is taking Dilaudid 4 mg every 4-6 hours for pain.  She reports pain in her lower abdomen.  She also notes decrease in bowel movement since surgery despite taking senna.  She has some perianal discomfort and used hemorrhoid cream to help with that.  She been feeling hot and cold with no temperature checks.  No definite fevers.  She did have a Nicholson catheter after surgery which was removed prior to discharge from the hospital.   She had small amount of bowel movement on Thursday but not ever since. Adds she had trouble passing gas. For pain management after her surgery she was prescribed Tylenol and Dilaudid.     Independent Historian   Patient's son provides additional history as included above.     Review of External Notes   I reviewed her records in care everywhere from 10/29/2024 regarding her surgery and hospitalization.  Details are included above in the HPI.    Past Medical History     Medical History and Problem List   Past Medical History:   Diagnosis Date    Colon adenoma 05/23/2024    Other unspecified back disorder        Medications   calcium carbonate (OS-PATITO) 1500 (600 Ca) MG tablet  ferrous sulfate (FE TABS) 325 (65 Fe) MG EC tablet  polyethylene glycol (MIRALAX) 17 GM/Dose powder  vitamin D3 (CHOLECALCIFEROL) 1.25 MG (33393 UT)  "capsule        Surgical History   Past Surgical History:   Procedure Laterality Date    COLONOSCOPY N/A 5/23/2024    Procedure: Colonoscopy with polypectomies by jumbo biopsy forceps;  Surgeon: Francine Landers MD;  Location:  GI       Physical Exam     Patient Vitals for the past 24 hrs:   BP Temp Temp src Pulse Resp SpO2 Height Weight   11/03/24 1111 124/82 99.7  F (37.6  C) Oral 97 20 99 % 1.676 m (5' 6\") 74 kg (163 lb 2.3 oz)     Physical Exam  General: Well appearing, nontoxic. Resting comfortably  Head:  Scalp, face, and head appear normal  Eyes:  Pupils are equal, round    Conjunctivae non-injected and sclerae white  ENT:    The external nose is normal    Pinnae are normal  Neck:  Normal range of motion    There is no rigidity noted    Trachea is in the midline  CV:  Regular rate and rhythm     Normal S1/S2, no S3/S4    No murmur or rub. Radial pulses 2+ bilaterally.  Resp:  Lungs are clear and equal bilaterally  There is no tachypnea    No increased work of breathing    No rales, wheezing, or rhonchi  GI:  Abdomen is soft, no rigidity or guarding    No distension, or mass    Moderate diffuse tenderness greatest in the suprapubic region.  No guarding or rebound tenderness.   Rectal Exam: No external anal lesions. Rectal tone normal. No bright red blood or melena.  No internal masses or lesions palpated. No fecal impaction. Sensation normal in the perianal region.  MS:  Normal muscular tone. T and L spine non-tender without stepoffs.  Healing midline lumbar spine surgical incision is clean dry and intact without erythema swelling or tenderness to palpation.  No dehiscence.    Symmetric motor strength    No lower extremity edema  Skin:  No rash or acute skin lesions noted  Neuro:  Awake and alert  Speech is normal and fluent  Moves all extremities spontaneously  Psych: Normal affect. Appropriate interactions.      Diagnostics     Lab Results   Labs Ordered and Resulted from Time of ED Arrival to Time of ED " Departure   COMPREHENSIVE METABOLIC PANEL - Abnormal       Result Value    Sodium 133 (*)     Potassium 4.4      Carbon Dioxide (CO2) 23      Anion Gap 11      Urea Nitrogen 6.3 (*)     Creatinine 0.55      GFR Estimate >90      Calcium 8.4 (*)     Chloride 99      Glucose 99      Alkaline Phosphatase 106      AST 49 (*)     ALT 55 (*)     Protein Total 6.0 (*)     Albumin 3.1 (*)     Bilirubin Total 0.5     LIPASE - Abnormal    Lipase 11 (*)    CBC WITH PLATELETS AND DIFFERENTIAL - Abnormal    WBC Count 9.5      RBC Count 3.11 (*)     Hemoglobin 10.0 (*)     Hematocrit 28.9 (*)     MCV 93      MCH 32.2      MCHC 34.6      RDW 12.2      Platelet Count 232      % Neutrophils 75      % Lymphocytes 13      % Monocytes 10      % Eosinophils 1      % Basophils 0      % Immature Granulocytes 0      NRBCs per 100 WBC 0      Absolute Neutrophils 7.1      Absolute Lymphocytes 1.3      Absolute Monocytes 1.0      Absolute Eosinophils 0.1      Absolute Basophils 0.0      Absolute Immature Granulocytes 0.0      Absolute NRBCs 0.0         Imaging   Lumbar spine MRI w & w/o contrast - surgery <10yrs   Final Result   IMPRESSION:   1.  Postoperative changes of interbody fusion, posterior fusion and dorsal decompression at L5-S1.   2.  At L5-S1, there is a dorsal spinal fluid collection, which contributes to moderate effective narrowing of the thecal sac. This could represent a subdural collection, though epidural abscess is difficult to entirely exclude given a question of    peripheral enhancement. Correlation for clinical signs of infection is recommended.   3.  At L5-S1, there is persistent grade I/II anterolisthesis and severe bilateral neural foraminal stenosis with compression of the exiting L5 nerve roots.   4.  Posterolateral subdural collection extending from L3 to the L4-L5 level with slight displacement of the cauda equina nerve roots.   5.  In the paraspinal soft tissues, there is peripherally enhancing fluid along the  surgical tract. There is a 4 mm focus of artifact or a small collection in the left paraspinal musculature. Attention recommended on follow-up.            CT Abdomen Pelvis w Contrast   Final Result   IMPRESSION:    1.  Moderate proctitis that could be infectious or inflammatory.   2.  Associated ill-defined presacral fluid. No organized fluid collection.   3.  Moderate stool in the colon. No bowel obstruction.             EKG   None.    Independent Interpretation   None    ED Course      Medications Administered   Medications - No data to display    Procedures   Procedures     Discussion of Management   I discussed the case with Dr. Veliz of [facility] regarding the patient's case.     ED Course   ED Course as of 11/05/24 1031   Sun Nov 03, 2024   1210 I obtained history and examined the patient as noted above.    1622 Patient updated regarding findings and plan of care.        Additional Documentation  None    Medical Decision Making / Diagnosis     CMS Diagnoses: None    MIPS       None    [facility]   Ar Elisabeth Patricia is a 64 year old female presented to the emergency department after recent spinal surgery for difficulty urinating, constipation and lower abdominal pain.  On my evaluation she is well-appearing, hemodynamically stable and afebrile.  Patient has been taking 2 to 4 mg of Dilaudid every 4 hours.  Her back pain is well-controlled and she has no fever or other infectious symptoms.  No saddle anesthesia.  Rectal exam was performed which was negative for any fecal impaction and demonstrated normal rectal tone and perianal sensation.  No other complications.  No hemorrhoids or fissures.  No evidence of any GI bleeding.  Constipation due to significant opioid use since surgery.  Patient is able to urinate some here in the emergency department urinalysis is negative for UTI.  Postvoid residual bladder volumes however ranged in the 250 to 300 mL range and patient had significant suprapubic discomfort.   Urinary retention is likely due to recent surgery, anesthesia and constipation.  However given her symptoms there was concern for possible spinal nerve root compression or complication such as epidural abscess, epidural hematoma or other postoperative complication.  MRI of the lumbar spine was obtained which shows multiple changes consistent with her history of recent surgery.  At the level of her surgery there is a dorsal spinal fluid collection which causes moderate narrowing of the thecal sac without significant entrapment of the nerves.  Clinically patient has no infectious symptoms which would be consistent with an abscess or postoperative infection.  Given the findings on MRI and the patient's symptoms I discussed the case with [facility] who was able to review the findings and noted that they are typical and expected at this point in the patient's postoperative course.  He did not feel that her overall presentation is consistent with cauda equina syndrome or spinal emergency.  He agreed with plan for Nicholson catheter placement, increase in bowel regimen and close outpatient follow-up with her surgeon as well as primary care physician.  A Nicholson catheter was placed with improvement in the patient's symptoms.  ED evaluation is otherwise reassuring.  Patient is stable for discharge to home with immediate return for any worsening.  I have recommended she start MiraLAX 2-3 times daily until she is having good bowel movements and then reduce to once daily or every other day to prevent constipation while on opioids.  She should also continue her senna.  Patient and her family are agreeable to plan of care and she was discharged in stable condition.    Disposition   The patient was discharged.     Diagnosis     ICD-10-CM    1. Postoperative urinary retention  N99.89     R33.8       2. Opioid-induced constipation  K59.03     T40.2X5A            Discharge Medications   Discharge Medication List as of 11/3/2024  4:43  PM      Miralax - see AVS      Scribe Disclosure:  I, Deloresxin Delgado, am serving as a scribe at 1:10 PM on 11/3/2024 to document services personally performed by Keven Ohara MD based on my observations and the provider's statements to me.        Keven Ohara MD  11/05/24 1047

## 2024-11-03 NOTE — ED TRIAGE NOTES
"Pt arrives via EMS with complaints of abdominal pain that started last night. Patient states that she has been constipated since her spinal fusion surgery on Tuesday until Thursday night. She has now been having diarrhea and urinary retention. States she feels like she has to go to the bathroom but only \"a trickle\" comes out. States urinating is painful. Has been taking dilaudid at home for post op pain, last dose at 0600 today. VSS and A/O x4 at bedside.     Triage Assessment (Adult)       Row Name 11/03/24 1113          Triage Assessment    Airway WDL WDL        Respiratory WDL    Respiratory WDL WDL        Skin Circulation/Temperature WDL    Skin Circulation/Temperature WDL WDL        Cardiac WDL    Cardiac WDL WDL        Peripheral/Neurovascular WDL    Peripheral Neurovascular WDL WDL        Cognitive/Neuro/Behavioral WDL    Cognitive/Neuro/Behavioral WDL WDL                     "

## 2024-11-03 NOTE — ED NOTES
Bed: ED28  Expected date:   Expected time:   Means of arrival:   Comments:  BV6- 64y, F, abd pain, urinary retention, recent spinal fusion

## 2025-06-23 ENCOUNTER — PATIENT OUTREACH (OUTPATIENT)
Dept: CARE COORDINATION | Facility: CLINIC | Age: 65
End: 2025-06-23
Payer: COMMERCIAL

## 2025-08-23 ENCOUNTER — HEALTH MAINTENANCE LETTER (OUTPATIENT)
Age: 65
End: 2025-08-23

## (undated) DEVICE — KIT ENDO TURNOVER/PROCEDURE W/CLEAN A SCOPE LINERS 103888

## (undated) DEVICE — ENDO FORCEP SPIKED SERRATED SHAFT JUMBO 239CM G56998

## (undated) RX ORDER — FENTANYL CITRATE 50 UG/ML
INJECTION, SOLUTION INTRAMUSCULAR; INTRAVENOUS
Status: DISPENSED
Start: 2024-05-23

## (undated) RX ORDER — SIMETHICONE 40MG/0.6ML
SUSPENSION, DROPS(FINAL DOSAGE FORM)(ML) ORAL
Status: DISPENSED
Start: 2024-05-23